# Patient Record
Sex: FEMALE | Race: WHITE | NOT HISPANIC OR LATINO | Employment: FULL TIME | URBAN - METROPOLITAN AREA
[De-identification: names, ages, dates, MRNs, and addresses within clinical notes are randomized per-mention and may not be internally consistent; named-entity substitution may affect disease eponyms.]

---

## 2017-04-01 DIAGNOSIS — Z12.39 ENCOUNTER FOR OTHER SCREENING FOR MALIGNANT NEOPLASM OF BREAST: ICD-10-CM

## 2017-04-27 ENCOUNTER — ALLSCRIPTS OFFICE VISIT (OUTPATIENT)
Dept: OTHER | Facility: OTHER | Age: 62
End: 2017-04-27

## 2017-05-02 ENCOUNTER — GENERIC CONVERSION - ENCOUNTER (OUTPATIENT)
Dept: OTHER | Facility: OTHER | Age: 62
End: 2017-05-02

## 2017-06-09 ENCOUNTER — HOSPITAL ENCOUNTER (OUTPATIENT)
Dept: RADIOLOGY | Facility: HOSPITAL | Age: 62
Discharge: HOME | End: 2017-06-09
Attending: FAMILY MEDICINE
Payer: COMMERCIAL

## 2017-06-09 DIAGNOSIS — Z12.39 ENCOUNTER FOR OTHER SCREENING FOR MALIGNANT NEOPLASM OF BREAST: ICD-10-CM

## 2017-06-09 PROCEDURE — G0202 SCR MAMMO BI INCL CAD: HCPCS

## 2017-06-11 ENCOUNTER — GENERIC CONVERSION - ENCOUNTER (OUTPATIENT)
Dept: OTHER | Facility: OTHER | Age: 62
End: 2017-06-11

## 2017-06-29 ENCOUNTER — GENERIC CONVERSION - ENCOUNTER (OUTPATIENT)
Dept: OTHER | Facility: OTHER | Age: 62
End: 2017-06-29

## 2017-06-30 LAB
A/G RATIO (HISTORICAL): 2 (ref 1.2–2.2)
ALBUMIN SERPL BCP-MCNC: 4.6 G/DL (ref 3.6–4.8)
ALP SERPL-CCNC: 101 IU/L (ref 39–117)
ALT SERPL W P-5'-P-CCNC: 34 IU/L (ref 0–32)
AST SERPL W P-5'-P-CCNC: 24 IU/L (ref 0–40)
BILIRUB SERPL-MCNC: 0.4 MG/DL (ref 0–1.2)
BUN SERPL-MCNC: 14 MG/DL (ref 8–27)
BUN/CREA RATIO (HISTORICAL): 19 (ref 12–28)
CALCIUM SERPL-MCNC: 8.9 MG/DL (ref 8.7–10.3)
CHLORIDE SERPL-SCNC: 102 MMOL/L (ref 96–106)
CHOLEST SERPL-MCNC: 213 MG/DL (ref 100–199)
CO2 SERPL-SCNC: 25 MMOL/L (ref 18–29)
CREAT SERPL-MCNC: 0.72 MG/DL (ref 0.57–1)
EGFR AFRICAN AMERICAN (HISTORICAL): 103 ML/MIN/1.73
EGFR-AMERICAN CALC (HISTORICAL): 89 ML/MIN/1.73
GLUCOSE SERPL-MCNC: 108 MG/DL (ref 65–99)
HBA1C MFR BLD HPLC: 5.5 % (ref 4.8–5.6)
HDLC SERPL-MCNC: 64 MG/DL
INTERPRETATION (HISTORICAL): NORMAL
LDLC SERPL CALC-MCNC: 132 MG/DL (ref 0–99)
POTASSIUM SERPL-SCNC: 4 MMOL/L (ref 3.5–5.2)
SODIUM SERPL-SCNC: 144 MMOL/L (ref 134–144)
TOT. GLOBULIN, SERUM (HISTORICAL): 2.3 G/DL (ref 1.5–4.5)
TOTAL PROTEIN (HISTORICAL): 6.9 G/DL (ref 6–8.5)
TRIGL SERPL-MCNC: 84 MG/DL (ref 0–149)

## 2017-07-01 ENCOUNTER — GENERIC CONVERSION - ENCOUNTER (OUTPATIENT)
Dept: OTHER | Facility: OTHER | Age: 62
End: 2017-07-01

## 2017-10-31 ENCOUNTER — GENERIC CONVERSION - ENCOUNTER (OUTPATIENT)
Dept: OTHER | Facility: OTHER | Age: 62
End: 2017-10-31

## 2018-01-10 NOTE — RESULT NOTES
Verified Results  (1) COMPREHENSIVE METABOLIC PANEL [id number] 76:11ZN Mona Garza     Test Name Result Flag Reference   Glucose, Serum 100 mg/dL H 65-99   BUN 12 mg/dL  8-27   Creatinine, Serum 0 70 mg/dL  0 57-1 00   eGFR If NonAfricn Am 93 mL/min/1 73  >59   eGFR If Africn Am 107 mL/min/1 73  >59   BUN/Creatinine Ratio 17  11-26   Sodium, Serum 144 mmol/L  134-144   **Please note reference interval change**   Potassium, Serum 4 2 mmol/L  3 5-5 2   Chloride, Serum 103 mmol/L     **Please note reference interval change**   Carbon Dioxide, Total 25 mmol/L  18-29   Calcium, Serum 9 0 mg/dL  8 7-10 3   Protein, Total, Serum 6 6 g/dL  6 0-8 5   Albumin, Serum 4 1 g/dL  3 6-4 8   Globulin, Total 2 5 g/dL  1 5-4 5   A/G Ratio 1 6  1 1-2 5   Bilirubin, Total 0 5 mg/dL  0 0-1 2   Alkaline Phosphatase, S 113 IU/L     AST (SGOT) 29 IU/L  0-40   ALT (SGPT) 39 IU/L H 0-32     (1) LIPID PANEL FASTING W DIRECT LDL REFLEX [id number] 08:23AM Mona Garza     Test Name Result Flag Reference   Cholesterol, Total 187 mg/dL  100-199   Triglycerides 87 mg/dL  0-149   HDL Cholesterol 57 mg/dL  >39   LDL Cholesterol Calc 113 mg/dL H 0-99     (1) HEMOGLOBIN A1C [id number] 08:23AM Mona Garza     Test Name Result Flag Reference   Hemoglobin A1c 5 6 %  4 8-5 6   Pre-diabetes: 5 7 - 6 4           Diabetes: >6 4           Glycemic control for adults with diabetes: <7 0       Discussion/Summary   Sugar, kidneys, minerals and liver are normal/ok  Cholesterol profile is normal/good  Please schedule an office appointment    Dr Omero Mckenna

## 2018-01-12 NOTE — RESULT NOTES
Discussion/Summary   Mammogram is normal   Repeat in one year is recommended  Dr Nigel Saab CAD [id number] 09:10AM Ana Mcclellan Order Number: [id number]    - Patient Instructions: To schedule this appointment, please contact Central Scheduling at [phone]  Do not wear any perfume, powder, lotion or deodorant on breast or underarm area  Please bring your doctors order, referral (if needed) and insurance information with you on the day of the test  Failure to bring this information may result in this test being rescheduled  Arrive 15 minutes prior to your appointment time to register  On the day of your test, please bring any prior mammogram or breast studies with you that were not performed at a [facility]  Failure to bring prior exams may result in your test needing to be rescheduled  Test Name Result Flag Reference   MAMMO SCREENING BILATERAL W CAD (Report)     Patient History:   Patient is postmenopausal    Took hormonal contraceptives for 10 years beginning at age 20  Patient's BMI is 28 2  Reason for exam: screening, asymptomatic  Mammo Screening Bilateral W CAD: June 9, 2017 - Check In #:    [de-identified]   Bilateral MLO and CC view(s) were taken  Technologist: Kimberley Vega RTRM   Prior study comparison: March 25, 2016, mammo screening bilateral   W CAD, performed at [facility]  February 16, 2015, bilateral screening mammogram performed at [facility]  December 19, 2013, bilateral    screening mammogram performed at [facility]  April 27, 2012, bilateral screening mammogram performed    at [facility]  September 3, 2010,    bilateral screening mammogram performed at [facility]  There are scattered fibroglandular densities   Bilateral digital    mammography is performed  No dominant soft tissue mass,    architectural distortion or suspicious calcifications are noted  The skin and nipple contours are within normal limits  Scattered benign appearing calcifications are noted as is a right   outer breast intramammary lymph node  No evidence of malignancy  No significant changes when compared to prior studies  1  No evidence of malignancy  2  No significant    change when compared with the prior study  ACR BI-RADSï¾® Assessments: BiRad:2 - Benign     Recommendation:   Routine screening mammogram in 1 year  Analyzed by CAD     8-10% of cancers will be missed on mammography  Management of a    palpable abnormality must be based on clinical grounds  Patients   will be notified of their results via letter from our facility  Accredited by [employer] and FDA       Transcription Location: [facility] 98: [id number]     Risk Value(s):   Tyrer-Cuzick 10 Year: 3 900%, Tyrer-Cuzick Lifetime: 8 800%,    Myriad Table: 1 5%, JONATHON 5 Year: 2 2%, NCI Lifetime: 9 1%   Signed by:   Fang Lopez MD   6/9/17

## 2018-01-13 VITALS
BODY MASS INDEX: 31.66 KG/M2 | HEART RATE: 86 BPM | RESPIRATION RATE: 18 BRPM | TEMPERATURE: 98.2 F | HEIGHT: 66 IN | WEIGHT: 197 LBS | SYSTOLIC BLOOD PRESSURE: 120 MMHG | DIASTOLIC BLOOD PRESSURE: 72 MMHG

## 2018-01-13 NOTE — RESULT NOTES
Discussion/Summary   Sugar is in prediabetes range but kidneys, minerals and liver are normal       Cholesterol profile is ok but could be better with diet/exercise/weight loss     Dr Nan Collado        Verified Results  (1) COMPREHENSIVE METABOLIC PANEL [id number] 62:05QR [employer]     Test Name Result Flag Reference   Glucose, Serum 108 mg/dL H 65-99   BUN 14 mg/dL  8-27   Creatinine, Serum 0 72 mg/dL  0 57-1 00   BUN/Creatinine Ratio 19  12-28   Sodium, Serum 144 mmol/L  134-144   Potassium, Serum 4 0 mmol/L  3 5-5 2   Chloride, Serum 102 mmol/L     Carbon Dioxide, Total 25 mmol/L  18-29   Calcium, Serum 8 9 mg/dL  8 7-10 3   Protein, Total, Serum 6 9 g/dL  6 0-8 5   Albumin, Serum 4 6 g/dL  3 6-4 8   Globulin, Total 2 3 g/dL  1 5-4 5   A/G Ratio 2 0  1 2-2 2   Bilirubin, Total 0 4 mg/dL  0 0-1 2   Alkaline Phosphatase, S 101 IU/L     AST (SGOT) 24 IU/L  0-40   ALT (SGPT) 34 IU/L H 0-32   eGFR If NonAfricn Am 89 mL/min/1 73  >59   eGFR If Africn Am 103 mL/min/1 73  >59     (1) LIPID PANEL FASTING W DIRECT LDL REFLEX [id number] 08:09AM [employer]     Test Name Result Flag Reference   Cholesterol, Total 213 mg/dL H 100-199   Triglycerides 84 mg/dL  0-149   HDL Cholesterol 64 mg/dL  >39   LDL Cholesterol Calc 132 mg/dL H 0-99     (1) HEMOGLOBIN A1C [id number] 08:09AM [employer]     Test Name Result Flag Reference   Hemoglobin A1c 5 5 %  4 8-5 6   Pre-diabetes: 5 7 - 6 4           Diabetes: >6 4           Glycemic control for adults with diabetes: <7 0

## 2018-01-15 ENCOUNTER — ALLSCRIPTS OFFICE VISIT (OUTPATIENT)
Dept: OTHER | Facility: OTHER | Age: 63
End: 2018-01-15

## 2018-01-15 NOTE — MISCELLANEOUS
Message   Recorded as Task   Date: 05/02/2017 02:29 PM, Created By: Liat Sibley   Task Name: Call Back   Assigned To: Ankit Garcia   Regarding Patient: Belgica Byers, Status: Active   CommentWilfany Ferro - 02 May 2017 2:29 PM     TASK CREATED  Caller: Self; [phone] (Home); [phone] (Work)  DR Gabby Rosales    Pt needs 30 days of her prescriptions called into [address] [place] [address] because her mail order will not be there in time, and she is almost out     Chanel Corwin - 02 May 2017 2:36 PM     TASK REASSIGNED: Previously Assigned To [address] [place] [address] Apolinar Das  I sent you refill Bhupinder Soares - 02 May 2017 8:51 PM     TASK EDITED  done        Signatures   Electronically signed by : Carey Meyer DO; May  2 2017  8:52PM EST                       (Author)

## 2018-01-16 NOTE — RESULT NOTES
Verified Results  * MAMMO SCREENING BILATERAL W CAD [id number] 09:42AM Si Gaw Order Number: [id number]     Test Name Result Flag Reference   MAMMO SCREENING BILATERAL W CAD (Report)     Patient History:   Patient is postmenopausal    Took hormonal contraceptives for 10 years beginning at age 20  Patient's BMI is 28 2  Reason for exam: screening (asymptomatic)  Mammo Screening Bilateral W CAD: March 25, 2016 - Check In #:    [de-identified]   Bilateral CC and MLO view(s) were taken  Technologist: RT Jenna(R)(M)   Prior study comparison: February 16, 2015, bilateral screening    mammogram performed at [place] [address]  December 19, 2013, bilateral screening mammogram performed at [place] [address]  There are scattered fibroglandular densities  No new dominant    soft tissue mass, architectural distortion or suspicious    calcifications are noted  The skin and nipple structures are    within normal limits  Benign appearing calcifications are noted  No mammographic evidence of malignancy  No    significant changes when compared with prior studies  ASSESSMENT: BiRad:2 - Benign     Recommendation:   Routine screening mammogram of both breasts in 1 year  Analyzed by CAD     8-10% of cancers will be missed on mammography  Management of a    palpable abnormality must be based on clinical grounds  Patients   will be notified of their results via letter from our facility  Accredited by [employer] and FDA       Transcription Location: [facility] 98: [id number]     Risk Value(s):   Tyrer-Cuzick 10 Year: 4 086%, Tyrer-Cuzick Lifetime: 9 804%,    Myriad Table: 1 5%, JONATHON 5 Year: 2 0%, NCI Lifetime: 9 7%   Signed by:   Dejuan Chamorro MD   3/25/16       Plan  Encounter for screening mammogram for malignant neoplasm of breast    · * MAMMO SCREENING BILATERAL W CAD; Status:Complete;   Done: 93IAT7072  09:42AM    Discussion/Summary   Mammogram is normal   Please follow up in office    Dr Heather Negrete

## 2018-01-16 NOTE — PROGRESS NOTES
Assessment   1  Benign essential hypertension (401 1) (I10)   2  Hyperlipidemia LDL goal <130 (272 4) (E78 5)   3  IFG (impaired fasting glucose) (790 21) (R73 01)   4  BMI 28 0-28 9,adult (V85 24) (Z68 28)    Plan   Benign essential hypertension    · AmLODIPine Besylate 5 MG Oral Tablet; take 1 tablet by mouth every day   · Begin a limited exercise program ; Status:Complete;   Done: 63HUP1664   · Restrict the salt in your diet by avoiding highly salted foods ; Status:Complete;   Done:    41GDP4848   · Shared Decision Making Aid given; Status:Complete;   Done: 35NPO1396   · Follow-up visit in 6 months Evaluation and Treatment  Follow-up  Status: Complete     Done: 95TMZ1374  Benign essential hypertension, Hyperlipidemia LDL goal <130, IFG (impaired fasting    glucose)    · (1) COMPREHENSIVE METABOLIC PANEL; Status:Active; Requested for:15Jan2018;    · (1) HEMOGLOBIN A1C; Status:Active; Requested for:15Jan2018;    · (1) LIPID PANEL FASTING W DIRECT LDL REFLEX; Status:Active; Requested    for:15Jan2018;   Hyperlipidemia LDL goal <130    · Atorvastatin Calcium 10 MG Oral Tablet; TAKE 1 TABLET BY MOUTH ONE TIME    DAILY AS DIRECTED    Discussion/Summary   The patient was counseled regarding risk factor reductions,-- impressions,-- risks and benefits of treatment options  Chief Complaint   Seen for refill on medications  er/cma  History of Present Illness   htn stable labs reviewed aware myalgias of statins for purposes of CVD/CVA risk reduction was advised according to USPSTF guidelines, along with appropriate continued liver monitoring  Review of Systems        Cardiovascular: no chest pain  Respiratory: no shortness of breath  Gastrointestinal: no abdominal pain  Neurological: no dizziness  Active Problems   1  Allergic rhinitis (477 9) (J30 9)   2  Benign essential hypertension (401 1) (I10)   3  Colon cancer screening (V76 51) (Z12 11)   4   General medical examination (V70 9) (Z00 00)   5  Hyperlipidemia LDL goal <130 (272 4) (E78 5)   6  IFG (impaired fasting glucose) (790 21) (R73 01)   7  Immunization due (V05 9) (Z23)   8  Macular degeneration (362 50) (H35 30)   9  Mitral valve disorder (424 0) (I05 9)   10  Screening breast examination (V76 10) (Z12 31)   11  Screening for cardiovascular, respiratory, and genitourinary diseases      (V81 2,V81 4,V81 6) (Z13 6,Z13 83,Z13 89)   12  Screening for diabetes mellitus (DM) (V77 1) (Z13 1)    Past Medical History   1  History of Acute maxillary sinusitis, recurrence not specified (461 0) (J01 00)   2  History of Acute suppurative otitis media of left ear without spontaneous rupture of     tympanic membrane, recurrence not specified (382 00) (H66 002)   3  History of Acute upper respiratory infection (465 9) (J06 9)   4  History of Acute UTI (599 0) (N39 0)   5  History of Flu vaccine need (V04 81) (Z23)   6  History of acute bronchitis (V12 69) (Z87 09)   7  History of athlete's foot (V12 09) (Z86 19)   8  History of chest pain (V13 89) (Z87 898)   9  History of epistaxis (V12 69) (Z87 898)   10  History of headache (V13 89) (Z87 898)   11  History of syncope (V15 89) (Z87 898)   12  History of tinea corporis (V12 09) (Z86 19)   13  History of Immunization due (V05 9) (Z23)   14  History of Knee Sprain (844 9)   15  History of Limb pain (729 5) (M79 609)   16  History of Obesity (BMI 30-39 9) (278 00) (E66 9)   17  History of Open Wound Of The Hand (882 0)    Family History   Mother    1  Family history of Hyperlipidemia   2  Family history of Hypertension     The family history was reviewed and updated today  Social History    · Being A Social Drinker   · Former smoker (K84 92) (R99 630)  The social history was reviewed and updated today  Current Meds    1  AmLODIPine Besylate 5 MG Oral Tablet; take 1 tablet by mouth every day;      Therapy: 12FWS7322 to (Evaluate:06Jan2018)  Requested for: 31JAC2174; Last     Rx:37Pnw6771 Ordered   2  Atorvastatin Calcium 10 MG Oral Tablet; TAKE 1 TABLET BY MOUTH ONE TIME DAILY AS     DIRECTED; Therapy: 21WAR6891 to (Evaluate:27Jan2018)  Requested for: 29Oct2017; Last     Rx:29Oct2017 Ordered    Allergies   1  No Known Drug Allergies    Vitals   Vital Signs    Recorded: 60KVE3680 09:37AM   Temperature 97 9 F   Heart Rate 80   Respiration 20   Systolic 808   Diastolic 78   Height 5 ft 6 in   Weight 175 lb    BMI Calculated 28 25   BSA Calculated 1 89     Physical Exam        Constitutional      General appearance: No acute distress, well appearing and well nourished  Eyes      Conjunctiva and lids: No swelling, erythema or discharge  Pupils and irises: Equal, round and reactive to light  Ears, Nose, Mouth, and Throat      External inspection of ears and nose: Normal        Otoscopic examination: Tympanic membranes translucent with normal light reflex  Canals patent without erythema  Nasal mucosa, septum, and turbinates: Normal without edema or erythema  Oropharynx: Normal with no erythema, edema, exudate or lesions  Pulmonary      Respiratory effort: No increased work of breathing or signs of respiratory distress  Auscultation of lungs: Clear to auscultation  Cardiovascular      Auscultation of heart: Normal rate and rhythm, normal S1 and S2, without murmurs  Examination of extremities for edema and/or varicosities: Normal        Abdomen      Abdomen: Non-tender, no masses  Lymphatic      Palpation of lymph nodes in neck: No lymphadenopathy  Musculoskeletal      Gait and station: Normal        Digits and nails: Normal without clubbing or cyanosis  Skin      Skin and subcutaneous tissue: Normal without rashes or lesions         Psychiatric      Mood and affect: Normal           Results/Data   PHQ-2 Adult Depression Screening 88CTI6620 09:41AM User, Ahs      Test Name Result Flag Reference   PHQ-2 Adult Depression Score 0 Over the last two weeks, how often have you been bothered by any of the following problems? Little interest or pleasure in doing things: Not at all - 0     Feeling down, depressed, or hopeless: Not at all - 0   PHQ-2 Adult Depression Screening Negative          Provider Comments      htn/chol stable advised f/u loss is recommended  counselled      Health Management   Screening breast examination   * MAMMO SCREENING BILATERAL W CAD; every 1 year; Last 48DUB1897; Next Due: 72JFS9126;     Active    Signatures    Electronically signed by : Italo Martinez DO; Jan 16 2018 12:20AM EST                       (Author)

## 2018-01-17 NOTE — MISCELLANEOUS
Message   Recorded as Task   Date: 10/29/2017 08:30 PM, Created By: Angela Coker   Task Name: Follow Up   Assigned To: Ankit Garcia   Regarding Patient: Jayda Aleman, Status: In Progress   Comment:    Ankit Garcia - 29 Oct 2017 8:30 PM     TASK CREATED  due for q6m f/u appt   Veto Newberry - 30 Oct 2017 11:11 AM     TASK REASSIGNED: Previously Assigned To [facility],Team  15 minute appt with Dr Felix Joshi  Thanks! Rosarioe CJN   Sheyla Alcaraz - 30 Oct 2017 11:43 AM     TASK REPLIED TO: Previously Assigned To Katherin Robison  Left vm for pt to call back to schedule  Cordelia Regan - 31 Oct 2017 10:30 AM     TASK EDITED  LMOM TCB   Cordelia Regan - 31 Oct 2017 10:30 AM     TASK IN PROGRESS   Sima Dupont - 31 Oct 2017 5:15 PM     TASK EDITED  Pt returned call and left message on voicemail to return call  Echo Payne 61  - 31 Oct 2017 6:57 PM     TASK REASSIGNED: Previously Assigned To [address] [place], [place] [address]niki Das  spoke with pt, she is aware to schedule an appointment, she is going to check her work schedule because she is very busy and then will call back to marisol a follow up appointment   ac/cma        Signatures   Electronically signed by : Glenda Ramon DO; Oct 31 2017  7:27PM EST                       (Author)

## 2018-01-23 VITALS
BODY MASS INDEX: 28.12 KG/M2 | SYSTOLIC BLOOD PRESSURE: 132 MMHG | HEART RATE: 80 BPM | RESPIRATION RATE: 20 BRPM | HEIGHT: 66 IN | WEIGHT: 175 LBS | DIASTOLIC BLOOD PRESSURE: 78 MMHG | TEMPERATURE: 97.9 F

## 2018-02-27 ENCOUNTER — OFFICE VISIT (OUTPATIENT)
Dept: FAMILY MEDICINE CLINIC | Facility: CLINIC | Age: 63
End: 2018-02-27
Payer: COMMERCIAL

## 2018-02-27 VITALS
BODY MASS INDEX: 28.12 KG/M2 | TEMPERATURE: 99 F | HEART RATE: 80 BPM | DIASTOLIC BLOOD PRESSURE: 74 MMHG | WEIGHT: 174.2 LBS | SYSTOLIC BLOOD PRESSURE: 122 MMHG

## 2018-02-27 DIAGNOSIS — R04.0 EPISTAXIS: ICD-10-CM

## 2018-02-27 DIAGNOSIS — G44.89 OTHER HEADACHE SYNDROME: Primary | ICD-10-CM

## 2018-02-27 DIAGNOSIS — E78.5 HYPERLIPIDEMIA LDL GOAL <130: ICD-10-CM

## 2018-02-27 DIAGNOSIS — I10 BENIGN ESSENTIAL HYPERTENSION: ICD-10-CM

## 2018-02-27 DIAGNOSIS — R73.01 IFG (IMPAIRED FASTING GLUCOSE): ICD-10-CM

## 2018-02-27 PROCEDURE — 3078F DIAST BP <80 MM HG: CPT | Performed by: FAMILY MEDICINE

## 2018-02-27 PROCEDURE — 99214 OFFICE O/P EST MOD 30 MIN: CPT | Performed by: FAMILY MEDICINE

## 2018-02-27 PROCEDURE — 3074F SYST BP LT 130 MM HG: CPT | Performed by: FAMILY MEDICINE

## 2018-02-27 RX ORDER — AMLODIPINE BESYLATE 5 MG/1
TABLET ORAL
COMMUNITY
Start: 2018-01-16 | End: 2018-06-26 | Stop reason: SDUPTHER

## 2018-02-27 RX ORDER — ATORVASTATIN CALCIUM 10 MG/1
TABLET, FILM COATED ORAL
COMMUNITY
Start: 2018-01-16 | End: 2018-06-26 | Stop reason: SDUPTHER

## 2018-02-27 NOTE — PROGRESS NOTES
Assessment/Plan:    R/o intracranial process for usual headache w/o sign of infection  ent for possible posterior bleed enedelia if no better with saline and vaseline  Htn/chol stable  Prediabetes diet advised again       Diagnoses and all orders for this visit:    Other headache syndrome    Epistaxis    Other orders  -     amLODIPine (NORVASC) 5 mg tablet;   -     atorvastatin (LIPITOR) 10 mg tablet; No Follow-up on file  Subjective:      Patient ID: Elan Worrell is a 60 y o  female  Chief Complaint   Patient presents with    Nose Bleed     1x month        Nose bleeds, 1m, 8-10d, spontaneous, some from blowing, every few days, large blood clot last pm, drips down back for even 1hr, pressure glabella, no dizziness, no syncope, vision stable  Arms/legs fine, some right ear pressure, no f/c, bleeds for few minutes to 30min, no nsaids        The following portions of the patient's history were reviewed and updated as appropriate: allergies, current medications, past family history, past medical history, past social history, past surgical history and problem list     Review of Systems      Current Outpatient Prescriptions   Medication Sig Dispense Refill    amLODIPine (NORVASC) 5 mg tablet       atorvastatin (LIPITOR) 10 mg tablet        No current facility-administered medications for this visit  Objective:    /76   Pulse 80   Temp 99 °F (37 2 °C)   Wt 79 kg (174 lb 3 2 oz)   BMI 28 12 kg/m²        Physical Exam   Constitutional: She appears well-developed  HENT:   Head: Normocephalic  Right septal scab   Eyes: Conjunctivae are normal    Neck: Neck supple  Cardiovascular: Normal rate and intact distal pulses  Pulmonary/Chest: Effort normal  No respiratory distress  Abdominal: Soft  Musculoskeletal: She exhibits no edema or deformity  Neurological: She is alert  Skin: Skin is warm and dry     Psychiatric: Her behavior is normal  Thought content normal    Nursing note and vitals reviewed        No blood dripping in posterior pharynx at this time   no sinus pain    Yetta Comings, DO

## 2018-03-02 ENCOUNTER — HOSPITAL ENCOUNTER (OUTPATIENT)
Dept: RADIOLOGY | Facility: HOSPITAL | Age: 63
Discharge: HOME | End: 2018-03-02
Attending: FAMILY MEDICINE
Payer: COMMERCIAL

## 2018-03-02 DIAGNOSIS — R04.0 EPISTAXIS: ICD-10-CM

## 2018-03-02 DIAGNOSIS — G44.89 OTHER HEADACHE SYNDROME: ICD-10-CM

## 2018-03-02 PROCEDURE — 70450 CT HEAD/BRAIN W/O DYE: CPT

## 2018-05-27 LAB
ALBUMIN SERPL-MCNC: 4.9 G/DL (ref 3.6–4.8)
ALBUMIN/GLOB SERPL: 2.5 {RATIO} (ref 1.2–2.2)
ALP SERPL-CCNC: 90 IU/L (ref 39–117)
ALT SERPL-CCNC: 27 IU/L (ref 0–32)
AST SERPL-CCNC: 21 IU/L (ref 0–40)
BILIRUB SERPL-MCNC: 0.5 MG/DL (ref 0–1.2)
BUN SERPL-MCNC: 16 MG/DL (ref 8–27)
BUN/CREAT SERPL: 21 (ref 12–28)
CALCIUM SERPL-MCNC: 9.6 MG/DL (ref 8.7–10.3)
CHLORIDE SERPL-SCNC: 101 MMOL/L (ref 96–106)
CHOLEST SERPL-MCNC: 221 MG/DL (ref 100–199)
CO2 SERPL-SCNC: 25 MMOL/L (ref 18–29)
CREAT SERPL-MCNC: 0.76 MG/DL (ref 0.57–1)
GLOBULIN SER-MCNC: 2 G/DL (ref 1.5–4.5)
GLUCOSE SERPL-MCNC: 92 MG/DL (ref 65–99)
HBA1C MFR BLD: 5.5 % (ref 4.8–5.6)
HDLC SERPL-MCNC: 66 MG/DL
LDLC SERPL CALC-MCNC: 134 MG/DL (ref 0–99)
MICRODELETION SYND BLD/T FISH: NORMAL
POTASSIUM SERPL-SCNC: 3.9 MMOL/L (ref 3.5–5.2)
PROT SERPL-MCNC: 6.9 G/DL (ref 6–8.5)
SL AMB EGFR AFRICAN AMERICAN: 97 ML/MIN/1.73
SL AMB EGFR NON AFRICAN AMERICAN: 84 ML/MIN/1.73
SODIUM SERPL-SCNC: 143 MMOL/L (ref 134–144)
TRIGL SERPL-MCNC: 104 MG/DL (ref 0–149)

## 2018-06-26 ENCOUNTER — TELEPHONE (OUTPATIENT)
Dept: FAMILY MEDICINE CLINIC | Facility: CLINIC | Age: 63
End: 2018-06-26

## 2018-06-26 DIAGNOSIS — E78.5 HYPERLIPIDEMIA LDL GOAL <130: ICD-10-CM

## 2018-06-26 DIAGNOSIS — Z12.39 BREAST CANCER SCREENING: ICD-10-CM

## 2018-06-26 DIAGNOSIS — I10 BENIGN ESSENTIAL HYPERTENSION: Primary | ICD-10-CM

## 2018-06-26 RX ORDER — ATORVASTATIN CALCIUM 10 MG/1
10 TABLET, FILM COATED ORAL DAILY
Qty: 90 TABLET | Refills: 1 | Status: SHIPPED | OUTPATIENT
Start: 2018-06-26 | End: 2019-01-11 | Stop reason: SDUPTHER

## 2018-06-26 RX ORDER — AMLODIPINE BESYLATE 5 MG/1
5 TABLET ORAL DAILY
Qty: 90 TABLET | Refills: 1 | Status: SHIPPED | OUTPATIENT
Start: 2018-06-26 | End: 2019-01-11 | Stop reason: SDUPTHER

## 2018-07-25 ENCOUNTER — HOSPITAL ENCOUNTER (OUTPATIENT)
Dept: RADIOLOGY | Facility: HOSPITAL | Age: 63
Discharge: HOME | End: 2018-07-25
Attending: FAMILY MEDICINE
Payer: COMMERCIAL

## 2018-07-25 DIAGNOSIS — Z12.39 BREAST CANCER SCREENING: ICD-10-CM

## 2018-07-25 PROCEDURE — 77067 SCR MAMMO BI INCL CAD: CPT

## 2019-01-11 ENCOUNTER — OFFICE VISIT (OUTPATIENT)
Dept: FAMILY MEDICINE CLINIC | Facility: CLINIC | Age: 64
End: 2019-01-11
Payer: COMMERCIAL

## 2019-01-11 VITALS
HEART RATE: 78 BPM | RESPIRATION RATE: 18 BRPM | SYSTOLIC BLOOD PRESSURE: 130 MMHG | WEIGHT: 177.2 LBS | DIASTOLIC BLOOD PRESSURE: 80 MMHG | HEIGHT: 66 IN | TEMPERATURE: 98 F | BODY MASS INDEX: 28.48 KG/M2

## 2019-01-11 DIAGNOSIS — I10 BENIGN ESSENTIAL HYPERTENSION: Primary | ICD-10-CM

## 2019-01-11 DIAGNOSIS — E78.5 HYPERLIPIDEMIA LDL GOAL <130: ICD-10-CM

## 2019-01-11 DIAGNOSIS — Z23 IMMUNIZATION DUE: ICD-10-CM

## 2019-01-11 DIAGNOSIS — R53.83 OTHER FATIGUE: ICD-10-CM

## 2019-01-11 DIAGNOSIS — R05.9 COUGH: ICD-10-CM

## 2019-01-11 PROCEDURE — 3008F BODY MASS INDEX DOCD: CPT | Performed by: FAMILY MEDICINE

## 2019-01-11 PROCEDURE — 1036F TOBACCO NON-USER: CPT | Performed by: FAMILY MEDICINE

## 2019-01-11 PROCEDURE — 90471 IMMUNIZATION ADMIN: CPT

## 2019-01-11 PROCEDURE — 3079F DIAST BP 80-89 MM HG: CPT | Performed by: FAMILY MEDICINE

## 2019-01-11 PROCEDURE — 3075F SYST BP GE 130 - 139MM HG: CPT | Performed by: FAMILY MEDICINE

## 2019-01-11 PROCEDURE — 90682 RIV4 VACC RECOMBINANT DNA IM: CPT

## 2019-01-11 PROCEDURE — 99214 OFFICE O/P EST MOD 30 MIN: CPT | Performed by: FAMILY MEDICINE

## 2019-01-11 RX ORDER — AZITHROMYCIN 250 MG/1
TABLET, FILM COATED ORAL
Qty: 6 TABLET | Refills: 0 | Status: SHIPPED | OUTPATIENT
Start: 2019-01-11 | End: 2019-01-16

## 2019-01-11 RX ORDER — AMLODIPINE BESYLATE 5 MG/1
5 TABLET ORAL DAILY
Qty: 90 TABLET | Refills: 1 | Status: SHIPPED | OUTPATIENT
Start: 2019-01-11 | End: 2019-04-23

## 2019-01-11 RX ORDER — ATORVASTATIN CALCIUM 10 MG/1
10 TABLET, FILM COATED ORAL DAILY
Qty: 90 TABLET | Refills: 1 | Status: SHIPPED | OUTPATIENT
Start: 2019-01-11 | End: 2019-06-03 | Stop reason: SDUPTHER

## 2019-01-11 NOTE — PROGRESS NOTES
Assessment/Plan:    No problem-specific Assessment & Plan notes found for this encounter  Htn/chol stable  Cough prolonged, use mucinex DM bid , add abx if no better  q6m f/u advised     Diagnoses and all orders for this visit:    Benign essential hypertension  -     amLODIPine (NORVASC) 5 mg tablet; Take 1 tablet (5 mg total) by mouth daily  -     Comprehensive metabolic panel; Future    Hyperlipidemia LDL goal <130  -     atorvastatin (LIPITOR) 10 mg tablet; Take 1 tablet (10 mg total) by mouth daily  -     Lipid Panel with Direct LDL reflex; Future    Immunization due  -     influenza vaccine, [id number], quadrivalent, recombinant, PF, 0 5 mL, for patients 18 yr+ (FLUBLOK)    Other fatigue  -     TSH, 3rd generation; Future    Cough  -     azithromycin (ZITHROMAX) 250 mg tablet; Take 500mg on day 1, 250mg on days 2-5              Return in about 6 months (around 7/11/2019) for Recheck  Subjective:      Patient ID: Chel Rosa is a 58 y o  female  Chief Complaint   Patient presents with    Medication Refill     akrma     Follow-up     on medications akrma        HPI  Uri sx for 3w, fits of cough, no green mucus, congestion  Tolerating meds  No myalgias  Night time cough  Mostly low fat diet  Exercises daily, walking    No cp or edema  htn stable    No myalgias on lipitor  Use of statins for the purposes of CVD/CVA risks reduction was advised according to USPSTF guidelines along with appropriate continued liver monitoring  The following portions of the patient's history were reviewed and updated as appropriate: allergies, current medications, past family history, past medical history, past social history, past surgical history and problem list     Review of Systems   Respiratory: Negative for shortness of breath  Cardiovascular: Negative for chest pain and leg swelling           Current Outpatient Prescriptions   Medication Sig Dispense Refill    amLODIPine (NORVASC) 5 mg tablet Take 1 tablet (5 mg total) by mouth daily 90 tablet 1    atorvastatin (LIPITOR) 10 mg tablet Take 1 tablet (10 mg total) by mouth daily 90 tablet 1    azithromycin (ZITHROMAX) 250 mg tablet Take 500mg on day 1, 250mg on days 2-5 6 tablet 0     No current facility-administered medications for this visit  Objective:    /80   Pulse 78   Temp 98 °F (36 7 °C)   Resp 18   Ht 5' 6" (1 676 m)   Wt 80 4 kg (177 lb 3 2 oz)   BMI 28 60 kg/m²        Physical Exam   Constitutional: She appears well-developed  No distress  HENT:   Head: Normocephalic  Mouth/Throat: No oropharyngeal exudate  Eyes: Conjunctivae are normal  No scleral icterus  Neck: Neck supple  Cardiovascular: Normal rate and intact distal pulses  No murmur heard  Pulmonary/Chest: Effort normal  No respiratory distress  She has no wheezes  Abdominal: Soft  There is no tenderness  Musculoskeletal: She exhibits no edema or deformity  Neurological: She is alert  She exhibits normal muscle tone  Skin: Skin is warm and dry  No rash noted  No pallor  Psychiatric: Her behavior is normal  Thought content normal    Nursing note and vitals reviewed               Tennille Interiano DO

## 2019-04-23 ENCOUNTER — OFFICE VISIT (OUTPATIENT)
Dept: CARDIOLOGY CLINIC | Facility: CLINIC | Age: 64
End: 2019-04-23
Payer: COMMERCIAL

## 2019-04-23 VITALS
SYSTOLIC BLOOD PRESSURE: 130 MMHG | HEART RATE: 124 BPM | WEIGHT: 174 LBS | OXYGEN SATURATION: 97 % | DIASTOLIC BLOOD PRESSURE: 80 MMHG | BODY MASS INDEX: 27.97 KG/M2 | HEIGHT: 66 IN

## 2019-04-23 DIAGNOSIS — R73.01 IFG (IMPAIRED FASTING GLUCOSE): ICD-10-CM

## 2019-04-23 DIAGNOSIS — I05.9 MITRAL VALVE DISORDER: ICD-10-CM

## 2019-04-23 DIAGNOSIS — I10 BENIGN ESSENTIAL HYPERTENSION: ICD-10-CM

## 2019-04-23 DIAGNOSIS — I48.19 PERSISTENT ATRIAL FIBRILLATION (HCC): ICD-10-CM

## 2019-04-23 DIAGNOSIS — E78.5 DYSLIPIDEMIA: ICD-10-CM

## 2019-04-23 DIAGNOSIS — R06.02 EXERTIONAL SHORTNESS OF BREATH: ICD-10-CM

## 2019-04-23 PROBLEM — I48.91 ATRIAL FIBRILLATION (HCC): Status: ACTIVE | Noted: 2019-04-23

## 2019-04-23 PROCEDURE — 93000 ELECTROCARDIOGRAM COMPLETE: CPT | Performed by: INTERNAL MEDICINE

## 2019-04-23 PROCEDURE — 99245 OFF/OP CONSLTJ NEW/EST HI 55: CPT | Performed by: INTERNAL MEDICINE

## 2019-04-23 RX ORDER — APIXABAN 5 MG/1
1 TABLET, FILM COATED ORAL 2 TIMES DAILY
Refills: 0 | COMMUNITY
Start: 2019-04-20 | End: 2019-05-17 | Stop reason: SDUPTHER

## 2019-04-23 RX ORDER — DILTIAZEM HYDROCHLORIDE 240 MG/1
CAPSULE, COATED, EXTENDED RELEASE ORAL
Refills: 0 | COMMUNITY
Start: 2019-04-20 | End: 2019-05-17

## 2019-04-24 ENCOUNTER — ANESTHESIA (EMERGENCY)
Dept: NON INVASIVE DIAGNOSTICS | Facility: HOSPITAL | Age: 64
End: 2019-04-24
Payer: COMMERCIAL

## 2019-04-24 ENCOUNTER — APPOINTMENT (INPATIENT)
Dept: NON INVASIVE DIAGNOSTICS | Facility: HOSPITAL | Age: 64
End: 2019-04-24
Attending: INTERNAL MEDICINE
Payer: COMMERCIAL

## 2019-04-24 ENCOUNTER — APPOINTMENT (EMERGENCY)
Dept: RADIOLOGY | Facility: HOSPITAL | Age: 64
End: 2019-04-24
Payer: COMMERCIAL

## 2019-04-24 ENCOUNTER — HOSPITAL ENCOUNTER (EMERGENCY)
Facility: HOSPITAL | Age: 64
Discharge: HOME | End: 2019-04-24
Attending: EMERGENCY MEDICINE | Admitting: EMERGENCY MEDICINE
Payer: COMMERCIAL

## 2019-04-24 ENCOUNTER — ANESTHESIA EVENT (EMERGENCY)
Dept: NON INVASIVE DIAGNOSTICS | Facility: HOSPITAL | Age: 64
End: 2019-04-24
Payer: COMMERCIAL

## 2019-04-24 VITALS
SYSTOLIC BLOOD PRESSURE: 132 MMHG | DIASTOLIC BLOOD PRESSURE: 78 MMHG | WEIGHT: 173.94 LBS | OXYGEN SATURATION: 99 % | BODY MASS INDEX: 28.08 KG/M2 | RESPIRATION RATE: 20 BRPM | HEART RATE: 55 BPM | TEMPERATURE: 97.2 F

## 2019-04-24 DIAGNOSIS — I48.91 ATRIAL FIBRILLATION (HCC): Primary | ICD-10-CM

## 2019-04-24 DIAGNOSIS — I48.19 PERSISTENT ATRIAL FIBRILLATION (HCC): ICD-10-CM

## 2019-04-24 LAB
ALBUMIN SERPL BCP-MCNC: 3.7 G/DL (ref 3.5–5)
ALP SERPL-CCNC: 97 U/L (ref 46–116)
ALT SERPL W P-5'-P-CCNC: 57 U/L (ref 12–78)
ANION GAP SERPL CALCULATED.3IONS-SCNC: 12 MMOL/L (ref 4–13)
AST SERPL W P-5'-P-CCNC: 23 U/L (ref 5–45)
BASOPHILS # BLD AUTO: 0.02 THOUSANDS/ΜL (ref 0–0.1)
BASOPHILS NFR BLD AUTO: 0 % (ref 0–1)
BILIRUB SERPL-MCNC: 0.6 MG/DL (ref 0.2–1)
BUN SERPL-MCNC: 19 MG/DL (ref 5–25)
CALCIUM SERPL-MCNC: 9.1 MG/DL (ref 8.3–10.1)
CHLORIDE SERPL-SCNC: 106 MMOL/L (ref 100–108)
CO2 SERPL-SCNC: 23 MMOL/L (ref 21–32)
CREAT SERPL-MCNC: 0.9 MG/DL (ref 0.6–1.3)
EOSINOPHIL # BLD AUTO: 0.13 THOUSAND/ΜL (ref 0–0.61)
EOSINOPHIL NFR BLD AUTO: 2 % (ref 0–6)
ERYTHROCYTE [DISTWIDTH] IN BLOOD BY AUTOMATED COUNT: 13.3 % (ref 11.6–15.1)
GFR SERPL CREATININE-BSD FRML MDRD: 68 ML/MIN/1.73SQ M
GLUCOSE SERPL-MCNC: 102 MG/DL (ref 65–140)
HCT VFR BLD AUTO: 45 % (ref 34.8–46.1)
HGB BLD-MCNC: 14.6 G/DL (ref 11.5–15.4)
IMM GRANULOCYTES # BLD AUTO: 0.03 THOUSAND/UL (ref 0–0.2)
IMM GRANULOCYTES NFR BLD AUTO: 0 % (ref 0–2)
LYMPHOCYTES # BLD AUTO: 1.28 THOUSANDS/ΜL (ref 0.6–4.47)
LYMPHOCYTES NFR BLD AUTO: 19 % (ref 14–44)
MCH RBC QN AUTO: 28.2 PG (ref 26.8–34.3)
MCHC RBC AUTO-ENTMCNC: 32.4 G/DL (ref 31.4–37.4)
MCV RBC AUTO: 87 FL (ref 82–98)
MONOCYTES # BLD AUTO: 0.51 THOUSAND/ΜL (ref 0.17–1.22)
MONOCYTES NFR BLD AUTO: 8 % (ref 4–12)
NEUTROPHILS # BLD AUTO: 4.79 THOUSANDS/ΜL (ref 1.85–7.62)
NEUTS SEG NFR BLD AUTO: 71 % (ref 43–75)
NRBC BLD AUTO-RTO: 0 /100 WBCS
PLATELET # BLD AUTO: 247 THOUSANDS/UL (ref 149–390)
PMV BLD AUTO: 9.3 FL (ref 8.9–12.7)
POTASSIUM SERPL-SCNC: 3.7 MMOL/L (ref 3.5–5.3)
PROT SERPL-MCNC: 6.9 G/DL (ref 6.4–8.2)
RBC # BLD AUTO: 5.17 MILLION/UL (ref 3.81–5.12)
SODIUM SERPL-SCNC: 141 MMOL/L (ref 136–145)
TROPONIN I SERPL-MCNC: <0.02 NG/ML
WBC # BLD AUTO: 6.76 THOUSAND/UL (ref 4.31–10.16)

## 2019-04-24 PROCEDURE — 80053 COMPREHEN METABOLIC PANEL: CPT | Performed by: EMERGENCY MEDICINE

## 2019-04-24 PROCEDURE — 36415 COLL VENOUS BLD VENIPUNCTURE: CPT | Performed by: EMERGENCY MEDICINE

## 2019-04-24 PROCEDURE — 93005 ELECTROCARDIOGRAM TRACING: CPT

## 2019-04-24 PROCEDURE — 84484 ASSAY OF TROPONIN QUANT: CPT | Performed by: EMERGENCY MEDICINE

## 2019-04-24 PROCEDURE — 92960 CARDIOVERSION ELECTRIC EXT: CPT

## 2019-04-24 PROCEDURE — 92960 CARDIOVERSION ELECTRIC EXT: CPT | Performed by: INTERNAL MEDICINE

## 2019-04-24 PROCEDURE — 85025 COMPLETE CBC W/AUTO DIFF WBC: CPT | Performed by: EMERGENCY MEDICINE

## 2019-04-24 PROCEDURE — 71045 X-RAY EXAM CHEST 1 VIEW: CPT

## 2019-04-24 PROCEDURE — 96360 HYDRATION IV INFUSION INIT: CPT

## 2019-04-24 PROCEDURE — 96361 HYDRATE IV INFUSION ADD-ON: CPT

## 2019-04-24 PROCEDURE — 99285 EMERGENCY DEPT VISIT HI MDM: CPT

## 2019-04-24 RX ORDER — SODIUM CHLORIDE 9 MG/ML
INJECTION, SOLUTION INTRAVENOUS CONTINUOUS PRN
Status: DISCONTINUED | OUTPATIENT
Start: 2019-04-24 | End: 2019-04-24 | Stop reason: SURG

## 2019-04-24 RX ORDER — PROPOFOL 10 MG/ML
INJECTION, EMULSION INTRAVENOUS AS NEEDED
Status: DISCONTINUED | OUTPATIENT
Start: 2019-04-24 | End: 2019-04-24 | Stop reason: SURG

## 2019-04-24 RX ORDER — LIDOCAINE HYDROCHLORIDE 10 MG/ML
INJECTION, SOLUTION INFILTRATION; PERINEURAL AS NEEDED
Status: DISCONTINUED | OUTPATIENT
Start: 2019-04-24 | End: 2019-04-24 | Stop reason: SURG

## 2019-04-24 RX ADMIN — PROPOFOL 20 MG: 10 INJECTION, EMULSION INTRAVENOUS at 08:27

## 2019-04-24 RX ADMIN — PROPOFOL 80 MG: 10 INJECTION, EMULSION INTRAVENOUS at 08:26

## 2019-04-24 RX ADMIN — PROPOFOL 40 MG: 10 INJECTION, EMULSION INTRAVENOUS at 08:31

## 2019-04-24 RX ADMIN — SODIUM CHLORIDE: 0.9 INJECTION, SOLUTION INTRAVENOUS at 08:17

## 2019-04-24 RX ADMIN — LIDOCAINE HYDROCHLORIDE 50 MG: 10 INJECTION, SOLUTION INFILTRATION; PERINEURAL at 08:26

## 2019-04-24 RX ADMIN — SODIUM CHLORIDE 1000 ML: 0.9 INJECTION, SOLUTION INTRAVENOUS at 07:43

## 2019-04-25 ENCOUNTER — VBI (OUTPATIENT)
Dept: FAMILY MEDICINE CLINIC | Facility: CLINIC | Age: 64
End: 2019-04-25

## 2019-04-26 LAB
ATRIAL RATE: 187 BPM
QRS AXIS: -15 DEGREES
QRSD INTERVAL: 86 MS
QT INTERVAL: 394 MS
QTC INTERVAL: 468 MS
T WAVE AXIS: 33 DEGREES
VENTRICULAR RATE: 85 BPM

## 2019-04-26 PROCEDURE — 93010 ELECTROCARDIOGRAM REPORT: CPT | Performed by: INTERNAL MEDICINE

## 2019-05-01 ENCOUNTER — TELEPHONE (OUTPATIENT)
Dept: CARDIOLOGY CLINIC | Facility: CLINIC | Age: 64
End: 2019-05-01

## 2019-05-04 DIAGNOSIS — I48.19 PERSISTENT ATRIAL FIBRILLATION (HCC): ICD-10-CM

## 2019-05-06 ENCOUNTER — TELEPHONE (OUTPATIENT)
Dept: CARDIOLOGY CLINIC | Facility: CLINIC | Age: 64
End: 2019-05-06

## 2019-05-17 ENCOUNTER — OFFICE VISIT (OUTPATIENT)
Dept: CARDIOLOGY CLINIC | Facility: CLINIC | Age: 64
End: 2019-05-17
Payer: COMMERCIAL

## 2019-05-17 VITALS
WEIGHT: 173 LBS | OXYGEN SATURATION: 98 % | HEIGHT: 66 IN | SYSTOLIC BLOOD PRESSURE: 140 MMHG | BODY MASS INDEX: 27.8 KG/M2 | HEART RATE: 63 BPM | DIASTOLIC BLOOD PRESSURE: 80 MMHG

## 2019-05-17 DIAGNOSIS — I05.9 MITRAL VALVE DISORDER: ICD-10-CM

## 2019-05-17 DIAGNOSIS — R06.02 EXERTIONAL SHORTNESS OF BREATH: ICD-10-CM

## 2019-05-17 DIAGNOSIS — I48.19 PERSISTENT ATRIAL FIBRILLATION (HCC): ICD-10-CM

## 2019-05-17 DIAGNOSIS — R73.01 IFG (IMPAIRED FASTING GLUCOSE): ICD-10-CM

## 2019-05-17 DIAGNOSIS — I10 BENIGN ESSENTIAL HYPERTENSION: ICD-10-CM

## 2019-05-17 DIAGNOSIS — I48.0 PAROXYSMAL ATRIAL FIBRILLATION (HCC): ICD-10-CM

## 2019-05-17 PROCEDURE — 99214 OFFICE O/P EST MOD 30 MIN: CPT | Performed by: INTERNAL MEDICINE

## 2019-05-17 PROCEDURE — 93000 ELECTROCARDIOGRAM COMPLETE: CPT | Performed by: INTERNAL MEDICINE

## 2019-05-17 RX ORDER — APIXABAN 5 MG/1
5 TABLET, FILM COATED ORAL 2 TIMES DAILY
Qty: 180 TABLET | Refills: 3 | Status: SHIPPED | OUTPATIENT
Start: 2019-05-17 | End: 2020-02-10

## 2019-06-03 DIAGNOSIS — E78.5 HYPERLIPIDEMIA LDL GOAL <130: ICD-10-CM

## 2019-06-03 DIAGNOSIS — I10 BENIGN ESSENTIAL HYPERTENSION: ICD-10-CM

## 2019-06-03 RX ORDER — ATORVASTATIN CALCIUM 10 MG/1
TABLET, FILM COATED ORAL
Qty: 90 TABLET | Refills: 1 | Status: SHIPPED | OUTPATIENT
Start: 2019-06-03 | End: 2019-11-05 | Stop reason: SDUPTHER

## 2019-06-03 RX ORDER — AMLODIPINE BESYLATE 5 MG/1
TABLET ORAL
Qty: 90 TABLET | Refills: 1 | Status: SHIPPED | OUTPATIENT
Start: 2019-06-03 | End: 2019-06-25

## 2019-06-12 ENCOUNTER — HOSPITAL ENCOUNTER (OUTPATIENT)
Dept: NON INVASIVE DIAGNOSTICS | Facility: HOSPITAL | Age: 64
Discharge: HOME | End: 2019-06-12
Attending: INTERNAL MEDICINE
Payer: COMMERCIAL

## 2019-06-12 ENCOUNTER — HOSPITAL ENCOUNTER (OUTPATIENT)
Dept: RADIOLOGY | Facility: HOSPITAL | Age: 64
Discharge: HOME | End: 2019-06-12
Attending: INTERNAL MEDICINE
Payer: COMMERCIAL

## 2019-06-12 DIAGNOSIS — I10 BENIGN ESSENTIAL HYPERTENSION: ICD-10-CM

## 2019-06-12 DIAGNOSIS — R06.02 EXERTIONAL SHORTNESS OF BREATH: ICD-10-CM

## 2019-06-12 DIAGNOSIS — I05.9 MITRAL VALVE DISORDER: ICD-10-CM

## 2019-06-12 DIAGNOSIS — I48.0 PAROXYSMAL ATRIAL FIBRILLATION (HCC): ICD-10-CM

## 2019-06-12 LAB
CHEST PAIN STATEMENT: NORMAL
MAX DIASTOLIC BP: 80 MMHG
MAX HEART RATE: 137 BPM
MAX PREDICTED HEART RATE: 155 BPM
MAX. SYSTOLIC BP: 174 MMHG
PROTOCOL NAME: NORMAL
TARGET HR FORMULA: NORMAL
TEST INDICATION: NORMAL
TIME IN EXERCISE PHASE: NORMAL

## 2019-06-12 PROCEDURE — A9502 TC99M TETROFOSMIN: HCPCS

## 2019-06-12 PROCEDURE — 93017 CV STRESS TEST TRACING ONLY: CPT

## 2019-06-12 PROCEDURE — 93306 TTE W/DOPPLER COMPLETE: CPT

## 2019-06-12 PROCEDURE — 78452 HT MUSCLE IMAGE SPECT MULT: CPT

## 2019-06-13 ENCOUNTER — TELEPHONE (OUTPATIENT)
Dept: CARDIOLOGY CLINIC | Facility: CLINIC | Age: 64
End: 2019-06-13

## 2019-06-13 PROCEDURE — 93018 CV STRESS TEST I&R ONLY: CPT | Performed by: INTERNAL MEDICINE

## 2019-06-13 PROCEDURE — 93016 CV STRESS TEST SUPVJ ONLY: CPT | Performed by: INTERNAL MEDICINE

## 2019-06-13 PROCEDURE — 78452 HT MUSCLE IMAGE SPECT MULT: CPT | Performed by: INTERNAL MEDICINE

## 2019-06-13 PROCEDURE — 93306 TTE W/DOPPLER COMPLETE: CPT | Performed by: INTERNAL MEDICINE

## 2019-06-14 ENCOUNTER — TELEPHONE (OUTPATIENT)
Dept: CARDIOLOGY CLINIC | Facility: CLINIC | Age: 64
End: 2019-06-14

## 2019-06-25 ENCOUNTER — OFFICE VISIT (OUTPATIENT)
Dept: URGENT CARE | Facility: CLINIC | Age: 64
End: 2019-06-25
Payer: COMMERCIAL

## 2019-06-25 VITALS
TEMPERATURE: 98.8 F | RESPIRATION RATE: 18 BRPM | HEIGHT: 66 IN | HEART RATE: 62 BPM | WEIGHT: 172.8 LBS | SYSTOLIC BLOOD PRESSURE: 170 MMHG | DIASTOLIC BLOOD PRESSURE: 94 MMHG | OXYGEN SATURATION: 100 % | BODY MASS INDEX: 27.77 KG/M2

## 2019-06-25 DIAGNOSIS — H92.22 BLEEDING FROM LEFT EAR: Primary | ICD-10-CM

## 2019-06-25 PROCEDURE — 99213 OFFICE O/P EST LOW 20 MIN: CPT | Performed by: FAMILY MEDICINE

## 2019-09-20 NOTE — PROGRESS NOTES
Consultation - Cardiology Office  [facility]  Taina Saez 71 y o  female MRN: [id number]  : 1955  Unit/Bed#:  Encounter: [id number]      Assessment:     1  Paroxysmal atrial fibrillation (HCC)    2  Benign essential hypertension    3  Mitral valve disorder    4  IFG (impaired fasting glucose)    5  Other fatigue        Discussion summary and Plan:      1  Paroxysmal atrial fibrillation  Patient is staying in sinus rhythm  She is on low-dose metoprolol and Eliquis  Heart rate is acceptable  She may need to go on flecainide  Her echo and stress test shows she has a normal LV systolic function  She will be started on low-dose flecainide  Will start her flecainide 50 mg twice a day  She will call me back if he has any new problems  This was patient's 2nd episode        2  Benign essential hypertension  Patient blood pressure is upper limit of normal   Partially anxiety driven today  Continue metoprolol  Will consider adding low-dose amlodipine if blood pressure remains elevated  3  Exertional shortness of breath  Much better now  She is much more calmer less likely to be cardiac in origin in view of normal LV systolic function and nonischemic nuclear  4  History of mitral valve disorder  Echo Doppler shows no significant valvular disease no mitral valve prolapse  5  History of dyslipidemia  She is taking atorvastatin 10 mg p o  Q h s  LFTs were acceptable labs from 2019 reviewed  6  History of impaired glucose metabolism    Discussed with patient at length about flecainide, issues related to atrial fibrillation, stroke prevention, antithrombotic therapy no antidote all her questions answered  She is very well aware of it as her mother also had atrial fibrillation  Thank you for your consultation  If he have any question please call me at [phone]    Counseling :  A description of the counseling  Goals and Barriers    Patient's ability to self care: Yes  Medication side effect reviewed with patient in detail and all their questions answered to their satisfaction  Primary Care Physician: Evelyn Akers DO      HPI :     Porsche Mccartney is a 71y o  year old female who   Self-referred as she was in [facility] in the hospital with atrial fibrillation with rapid ventricular rate  Patient was at a function in [facility] when next the morning she found that she could not breathe she was taken to local hospital where she was found to be in AFib with rapid ventricular rate  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  As per patient she has a KATT guided cardioversion and she was given IV diuretics as well as Cardizem and started on Eliquis  She flew back Sunday and she has been doing well since yesterday she started feeling little lightheaded and fatigue and tired and today she came to see us in she is found to be in AFib with heart rate around 124 beats per minute  Unfortunately she has to fly tomorrow afternoon for her daughter's wedding to [place] but she is in atrial fibrillation  She is little upset with whole situation  No nausea no vomiting no PND no orthopnea  She has echo and KATT done but no stress test was done  We do not have those records available  She did bring some records from the hospital which includes labs her TSH was acceptable  Her liver enzymes were elevated  And she had probably pleural effusion as mentioned in the discharge recommendation to her  She is not taking any diuretic at this time  09/24/2019  Above reviewed  Patient came for follow-up  She did well after cardioversion she is staying in sinus rhythm  Heart rate now around 78 beats per minute  She is on low-dose of metoprolol  She is now also on Eliquis no issues with bleeding  Fatigue and tiredness has improved  No chest pain no shortness of breath no dizziness no lightheadedness  Issues related to atrial fibrillation, antithrombotic therapy  At this time she has no new complaints  Review of Systems   Constitutional: Negative for activity change, chills, diaphoresis, fever and unexpected weight change  HENT: Negative for congestion  Eyes: Negative for discharge and redness  Respiratory: Negative for cough, chest tightness, shortness of breath and wheezing  Cardiovascular: Negative  Negative for chest pain, palpitations and leg swelling  Gastrointestinal: Negative for abdominal pain, diarrhea and nausea  Endocrine: Negative  Genitourinary: Negative for decreased urine volume and urgency  Musculoskeletal: Negative  Negative for arthralgias, back pain and gait problem  Skin: Negative for rash and wound  Allergic/Immunologic: Negative  Neurological: Negative for dizziness, seizures, syncope, weakness, light-headedness and headaches  Hematological: Negative  Psychiatric/Behavioral: Negative for agitation and confusion  The patient is nervous/anxious          Historical Information   Past Medical History:   Diagnosis Date    Atrial fibrillation ([address] )     Hypertension     Mitral valve prolapse     Obesity     last assessed 4/27/2017     Past Surgical History:   Procedure Laterality Date    CATARACT EXTRACTION      B/L    HEMORRHOID SURGERY       Social History     Substance and Sexual Activity   Alcohol Use Yes    Alcohol/week: 3 0 standard drinks    Types: 3 Glasses of wine per week     Social History     Substance and Sexual Activity   Drug Use Never     Social History     Tobacco Use   Smoking Status Former Smoker    Types: Cigarettes   Smokeless Tobacco Never Used     Family History:   Family History   Problem Relation Age of Onset    Hyperlipidemia Mother     Hypertension Mother        Meds/Allergies     No Known Allergies    Current Outpatient Medications:     atorvastatin (LIPITOR) 10 mg tablet, TAKE 1 TABLET BY MOUTH  DAILY, Disp: 90 tablet, Rfl: 1    ELIQUIS 5 MG, Take 1 tablet (5 mg total) by mouth 2 (two) times a day, Disp: 180 tablet, Rfl: 3    metoprolol tartrate (LOPRESSOR) 25 mg tablet, Take 1 tablet (25 mg total) by mouth every 12 (twelve) hours, Disp: 180 tablet, Rfl: 3    flecainide (TAMBOCOR) 50 mg tablet, Take 1 tablet (50 mg total) by mouth 2 (two) times a day, Disp: 60 tablet, Rfl: 3    Vitals: Blood pressure 144/86, pulse 88, height 5' 6" (1 676 m), weight 80 3 kg (177 lb), SpO2 96 %  Body mass index is 28 57 kg/m²  Vitals:    09/24/19 1551   Weight: 80 3 kg (177 lb)     BP Readings from Last 3 Encounters:   09/24/19 144/86   06/25/19 170/94   05/17/19 140/80         Physical Exam   Constitutional: She is oriented to person, place, and time  She appears well-developed and well-nourished  No distress  HENT:   Head: Normocephalic and atraumatic  Eyes: Pupils are equal, round, and reactive to light  Neck: Neck supple  No JVD present  No tracheal deviation present  No thyromegaly present  Cardiovascular: Normal rate, regular rhythm, S1 normal, S2 normal and intact distal pulses  Exam reveals no gallop, no S3, no S4, no distant heart sounds and no friction rub  Murmur heard  Systolic (ejection) murmur is present with a grade of 2/6  S1-S2 regular, 2/6 ejection systolic murmur   Pulmonary/Chest: Effort normal and breath sounds normal  No respiratory distress  She has no wheezes  She has no rales  She exhibits no tenderness  Abdominal: Soft  Bowel sounds are normal  She exhibits no distension  There is no tenderness  Musculoskeletal: She exhibits no edema or deformity  Neurological: She is alert and oriented to person, place, and time  Skin: Skin is warm and dry  No rash noted  She is not diaphoretic  No pallor  Psychiatric: She has a normal mood and affect   Her behavior is normal  Judgment normal          Diagnostic Studies Review Cardio:  Echo stress test reviewed    EKG:      Twelve lead EKG done in our office on 04/23/2019 shows atrial fibrillation with rapid ventricular rate heart rate 124 beats per minute  Twelve lead EKG done in our office 05/17/2019 shows normal sinus rhythm heart rate 58 beats per minute  Left atrial enlargement  Incomplete RBBB  As compared to old EKG patient is now in sinus rhythm  Twelve lead EKG done 09/24/2019 shows normal sinus rhythm RSR pattern heart rate 78 beats per minute no significant change from old EKG patient is staying in sinus rhythm  Imaging:    Lab Review     BMP:  Lab Results   Component Value Date    SODIUM 141 04/24/2019    K 3 7 04/24/2019     04/24/2019    CO2 23 04/24/2019    BUN 19 04/24/2019    CREATININE 0 90 04/24/2019    GLUC 102 04/24/2019    CALCIUM 9 1 04/24/2019    EGFR 68 04/24/2019     LFT:  Lab Results   Component Value Date    AST 23 04/24/2019    ALT 57 04/24/2019    ALKPHOS 97 04/24/2019    TP 6 9 04/24/2019    ALB 3 7 04/24/2019      No results found for: [facility]  Lab Results   Component Value Date    HGBA1C 5 5 05/26/2018     Lipid Profile:   Lab Results   Component Value Date    CHOLESTEROL 221 (H) 05/26/2018    HDL 66 05/26/2018    LDLCALC 132 (H) 06/29/2017    TRIG 104 05/26/2018     Lab Results   Component Value Date    CHOLESTEROL 221 (H) 05/26/2018         Dr Bharath Ruvalcaba MD [facility]      "This note has been constructed using a voice recognition system  Therefore there may be syntax, spelling, and/or grammatical errors   Please call if you have any questions  "

## 2019-09-24 ENCOUNTER — OFFICE VISIT (OUTPATIENT)
Dept: CARDIOLOGY CLINIC | Facility: CLINIC | Age: 64
End: 2019-09-24
Payer: COMMERCIAL

## 2019-09-24 VITALS
BODY MASS INDEX: 28.45 KG/M2 | SYSTOLIC BLOOD PRESSURE: 144 MMHG | HEART RATE: 88 BPM | OXYGEN SATURATION: 96 % | WEIGHT: 177 LBS | HEIGHT: 66 IN | DIASTOLIC BLOOD PRESSURE: 86 MMHG

## 2019-09-24 DIAGNOSIS — R73.01 IFG (IMPAIRED FASTING GLUCOSE): ICD-10-CM

## 2019-09-24 DIAGNOSIS — I10 BENIGN ESSENTIAL HYPERTENSION: ICD-10-CM

## 2019-09-24 DIAGNOSIS — I48.0 PAROXYSMAL ATRIAL FIBRILLATION (HCC): ICD-10-CM

## 2019-09-24 DIAGNOSIS — R53.83 OTHER FATIGUE: ICD-10-CM

## 2019-09-24 DIAGNOSIS — I05.9 MITRAL VALVE DISORDER: ICD-10-CM

## 2019-09-24 PROCEDURE — 99214 OFFICE O/P EST MOD 30 MIN: CPT | Performed by: INTERNAL MEDICINE

## 2019-09-24 PROCEDURE — 93000 ELECTROCARDIOGRAM COMPLETE: CPT | Performed by: INTERNAL MEDICINE

## 2019-09-24 RX ORDER — FLECAINIDE ACETATE 50 MG/1
50 TABLET ORAL 2 TIMES DAILY
Qty: 60 TABLET | Refills: 3 | Status: SHIPPED | OUTPATIENT
Start: 2019-09-24 | End: 2020-01-27

## 2019-10-02 ENCOUNTER — TRANSCRIBE ORDERS (OUTPATIENT)
Dept: ADMINISTRATIVE | Facility: HOSPITAL | Age: 64
End: 2019-10-02

## 2019-10-02 ENCOUNTER — TELEPHONE (OUTPATIENT)
Dept: FAMILY MEDICINE CLINIC | Facility: CLINIC | Age: 64
End: 2019-10-02

## 2019-10-02 DIAGNOSIS — Z12.31 VISIT FOR SCREENING MAMMOGRAM: Primary | ICD-10-CM

## 2019-10-02 DIAGNOSIS — Z12.39 BREAST CANCER SCREENING: Primary | ICD-10-CM

## 2019-10-02 NOTE — TELEPHONE ENCOUNTER
katherine called for an order for her routine mammo  She is already scheduled    Please call patient when order is ready for    Hawk aCrdona can be reached at [phone]

## 2019-10-24 ENCOUNTER — HOSPITAL ENCOUNTER (OUTPATIENT)
Dept: RADIOLOGY | Facility: HOSPITAL | Age: 64
Discharge: HOME | End: 2019-10-24
Attending: FAMILY MEDICINE
Payer: COMMERCIAL

## 2019-10-24 ENCOUNTER — TELEPHONE (OUTPATIENT)
Dept: RADIOLOGY | Facility: HOSPITAL | Age: 64
End: 2019-10-24

## 2019-10-24 VITALS — WEIGHT: 175 LBS | HEIGHT: 66 IN | BODY MASS INDEX: 28.12 KG/M2

## 2019-10-24 DIAGNOSIS — Z12.31 VISIT FOR SCREENING MAMMOGRAM: ICD-10-CM

## 2019-10-24 PROCEDURE — 77067 SCR MAMMO BI INCL CAD: CPT

## 2019-10-24 PROCEDURE — 77063 BREAST TOMOSYNTHESIS BI: CPT

## 2019-11-05 DIAGNOSIS — E78.5 HYPERLIPIDEMIA LDL GOAL <130: ICD-10-CM

## 2019-11-05 RX ORDER — ATORVASTATIN CALCIUM 10 MG/1
10 TABLET, FILM COATED ORAL DAILY
Qty: 90 TABLET | Refills: 0 | Status: SHIPPED | OUTPATIENT
Start: 2019-11-05 | End: 2019-12-09 | Stop reason: SDUPTHER

## 2019-11-07 ENCOUNTER — ANESTHESIA EVENT (OUTPATIENT)
Dept: NON INVASIVE DIAGNOSTICS | Facility: HOSPITAL | Age: 64
End: 2019-11-07

## 2019-11-07 ENCOUNTER — HOSPITAL ENCOUNTER (OUTPATIENT)
Dept: NON INVASIVE DIAGNOSTICS | Facility: HOSPITAL | Age: 64
Discharge: HOME | End: 2019-11-07
Attending: INTERNAL MEDICINE | Admitting: INTERNAL MEDICINE
Payer: COMMERCIAL

## 2019-11-07 ENCOUNTER — TELEPHONE (OUTPATIENT)
Dept: CARDIOLOGY CLINIC | Facility: CLINIC | Age: 64
End: 2019-11-07

## 2019-11-07 ENCOUNTER — OFFICE VISIT (OUTPATIENT)
Dept: CARDIOLOGY CLINIC | Facility: CLINIC | Age: 64
End: 2019-11-07
Payer: COMMERCIAL

## 2019-11-07 ENCOUNTER — ANESTHESIA (OUTPATIENT)
Dept: NON INVASIVE DIAGNOSTICS | Facility: HOSPITAL | Age: 64
End: 2019-11-07

## 2019-11-07 VITALS
OXYGEN SATURATION: 98 % | BODY MASS INDEX: 27.8 KG/M2 | HEART RATE: 119 BPM | DIASTOLIC BLOOD PRESSURE: 70 MMHG | WEIGHT: 173 LBS | HEIGHT: 66 IN | SYSTOLIC BLOOD PRESSURE: 130 MMHG

## 2019-11-07 VITALS
SYSTOLIC BLOOD PRESSURE: 114 MMHG | RESPIRATION RATE: 18 BRPM | OXYGEN SATURATION: 96 % | HEART RATE: 58 BPM | DIASTOLIC BLOOD PRESSURE: 72 MMHG

## 2019-11-07 DIAGNOSIS — E78.5 DYSLIPIDEMIA: ICD-10-CM

## 2019-11-07 DIAGNOSIS — I48.91 ATRIAL FIBRILLATION WITH CONTROLLED VENTRICULAR RATE (HCC): ICD-10-CM

## 2019-11-07 DIAGNOSIS — I10 BENIGN ESSENTIAL HYPERTENSION: ICD-10-CM

## 2019-11-07 DIAGNOSIS — R06.02 EXERTIONAL SHORTNESS OF BREATH: ICD-10-CM

## 2019-11-07 DIAGNOSIS — R00.2 PALPITATION: ICD-10-CM

## 2019-11-07 DIAGNOSIS — I48.0 PAROXYSMAL ATRIAL FIBRILLATION (HCC): ICD-10-CM

## 2019-11-07 PROCEDURE — 3075F SYST BP GE 130 - 139MM HG: CPT | Performed by: INTERNAL MEDICINE

## 2019-11-07 PROCEDURE — 92960 CARDIOVERSION ELECTRIC EXT: CPT

## 2019-11-07 PROCEDURE — 92960 CARDIOVERSION ELECTRIC EXT: CPT | Performed by: INTERNAL MEDICINE

## 2019-11-07 PROCEDURE — 93000 ELECTROCARDIOGRAM COMPLETE: CPT | Performed by: INTERNAL MEDICINE

## 2019-11-07 PROCEDURE — 99215 OFFICE O/P EST HI 40 MIN: CPT | Performed by: INTERNAL MEDICINE

## 2019-11-07 PROCEDURE — 3078F DIAST BP <80 MM HG: CPT | Performed by: INTERNAL MEDICINE

## 2019-11-07 RX ORDER — PROPOFOL 10 MG/ML
INJECTION, EMULSION INTRAVENOUS AS NEEDED
Status: DISCONTINUED | OUTPATIENT
Start: 2019-11-07 | End: 2019-11-07 | Stop reason: SURG

## 2019-11-07 RX ORDER — SODIUM CHLORIDE 9 MG/ML
INJECTION, SOLUTION INTRAVENOUS CONTINUOUS PRN
Status: DISCONTINUED | OUTPATIENT
Start: 2019-11-07 | End: 2019-11-07 | Stop reason: SURG

## 2019-11-07 RX ORDER — LIDOCAINE HYDROCHLORIDE 10 MG/ML
INJECTION, SOLUTION INFILTRATION; PERINEURAL AS NEEDED
Status: DISCONTINUED | OUTPATIENT
Start: 2019-11-07 | End: 2019-11-07 | Stop reason: SURG

## 2019-11-07 RX ADMIN — SODIUM CHLORIDE: 0.9 INJECTION, SOLUTION INTRAVENOUS at 12:14

## 2019-11-07 RX ADMIN — PROPOFOL 60 MG: 10 INJECTION, EMULSION INTRAVENOUS at 12:28

## 2019-11-07 RX ADMIN — LIDOCAINE HYDROCHLORIDE 60 MG: 10 INJECTION, SOLUTION INFILTRATION; PERINEURAL at 12:28

## 2019-11-07 NOTE — PROGRESS NOTES
Consultation - Cardiology Office  [facility]  Staci Dasilva 71 y o  female MRN: [id number]  : 1955  Unit/Bed#:  Encounter: [id number]      Assessment:     1  Atrial fibrillation with controlled ventricular rate (HCC)    2  Paroxysmal atrial fibrillation ([address] )    3  Exertional shortness of breath    4  Benign essential hypertension    5  Dyslipidemia    6  Palpitation        Discussion summary and Plan:      1  Atrial fibrillation with rapid ventricular rate with history of Paroxysmal atrial fibrillation  Patient went back into atrial fibrillation with rapid ventricular rate  Her heart rate is 120-150 beats per minute  She was not taking flecainide as prescribed  She was taking low-dose metoprolol  She is NPO today  She has been compliant with Eliquis  Advised patient to be compliant with flecainide  Will give flecainide 100 mg 1 dose now  Increase metoprolol to 25 mg 3 times daily  She will be scheduled for cardioversion as she has not palpitations and symptomatic with symptoms of shortness of breath  If he continues to have reoccurrence of atrial fibrillation and symptomatic she may need ablation therapy  2  Exertion shortness of breath palpitation  Since last night patient is having palpitations and shortness of breath most likely AFib with rapid ventricular rate  She is compliant with antithrombotic therapy she will be scheduled for cardioversion as she is NPO and she has not eaten this morning  3  Benign essential hypertension  Patient blood pressure has been acceptable  Continue increased dose of metoprolol  4  History of mitral valve disorder  Echo Doppler shows no significant valvular disease no mitral valve prolapse  Echo finding reviewed with her  She had a negative stress test also  5  History of dyslipidemia  Continue statin     6   History of impaired glucose metabolism    Discussed with patient at length about flecainide, issues related to atrial fibrillation, stroke prevention, antithrombotic therapy no antidote all her questions answered  She need to be compliant with medication otherwise high risk for reoccurrence of atrial fibrillation  Thank you for your consultation  If he have any question please call me at [phone]    Counseling :  A description of the counseling  Goals and Barriers  Patient's ability to self care: Yes  Medication side effect reviewed with patient in detail and all their questions answered to their satisfaction  Primary Care Physician: Adam Caballero DO      HPI :     Trice Wing is a 71y o  year old female who   Self-referred as she was in [facility] in the hospital with atrial fibrillation with rapid ventricular rate  Patient was at a function in [facility] when next the morning she found that she could not breathe she was taken to local hospital where she was found to be in AFib with rapid ventricular rate  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  As per patient she has a KATT guided cardioversion and she was given IV diuretics as well as Cardizem and started on Eliquis  She flew back Sunday and she has been doing well since yesterday she started feeling little lightheaded and fatigue and tired and today she came to see us in she is found to be in AFib with heart rate around 124 beats per minute  Unfortunately she has to fly tomorrow afternoon for her daughter's wedding to [place] but she is in atrial fibrillation  She is little upset with whole situation  No nausea no vomiting no PND no orthopnea  She has echo and KATT done but no stress test was done  We do not have those records available  She did bring some records from the hospital which includes labs her TSH was acceptable  Her liver enzymes were elevated  And she had probably pleural effusion as mentioned in the discharge recommendation to her    She is not taking any diuretic at this time  11/07/2019  Above reviewed patient came for follow-up  She did well after cardioversion but last night she noted her heart rate was racing  She found she was in AFib with rapid ventricular rate  She did not take flecainide because of fear of side effects  She has it at home  She has not eaten today she is feeling some exertional shortness of breath and palpitations denies any chest pain  She had previously normal LV function and stress test   She has been compliant with her Eliquis  No fever no chills no nausea no vomiting no other significant complaint  Review of Systems   Constitutional: Positive for activity change  Negative for chills, diaphoresis, fever and unexpected weight change  HENT: Negative for congestion  Eyes: Negative for discharge and redness  Respiratory: Positive for shortness of breath  Negative for cough, chest tightness and wheezing  Cardiovascular: Positive for palpitations  Negative for chest pain and leg swelling  Gastrointestinal: Negative for abdominal pain, diarrhea and nausea  Endocrine: Negative  Genitourinary: Negative for decreased urine volume and urgency  Musculoskeletal: Negative  Negative for arthralgias, back pain and gait problem  Skin: Negative for rash and wound  Allergic/Immunologic: Negative  Neurological: Negative for dizziness, seizures, syncope, weakness, light-headedness and headaches  Hematological: Negative  Psychiatric/Behavioral: Negative for agitation and confusion  The patient is nervous/anxious          Historical Information   Past Medical History:   Diagnosis Date    Atrial fibrillation ([address] )     Hypertension     Mitral valve prolapse     Obesity     last assessed 4/27/2017     Past Surgical History:   Procedure Laterality Date    CATARACT EXTRACTION      B/L    HEMORRHOID SURGERY       Social History     Substance and Sexual Activity   Alcohol Use Yes    Alcohol/week: 3 0 standard drinks    Types: 3 Glasses of wine per week     Social History     Substance and Sexual Activity   Drug Use Never     Social History     Tobacco Use   Smoking Status Former Smoker    Types: Cigarettes   Smokeless Tobacco Never Used     Family History:   Family History   Problem Relation Age of Onset    Hyperlipidemia Mother     Hypertension Mother     No Known Problems Father     No Known Problems Sister     No Known Problems Daughter     No Known Problems Maternal Grandmother     No Known Problems Paternal Grandmother     No Known Problems Sister     No Known Problems Daughter     Pancreatic cancer Maternal Aunt     No Known Problems Maternal Aunt     No Known Problems Maternal Aunt     No Known Problems Maternal Aunt     No Known Problems Paternal Aunt     No Known Problems Paternal Aunt     No Known Problems Paternal Aunt        Meds/Allergies     No Known Allergies    Current Outpatient Medications:     atorvastatin (LIPITOR) 10 mg tablet, Take 1 tablet (10 mg total) by mouth daily, Disp: 90 tablet, Rfl: 0    ELIQUIS 5 MG, Take 1 tablet (5 mg total) by mouth 2 (two) times a day, Disp: 180 tablet, Rfl: 3    metoprolol tartrate (LOPRESSOR) 25 mg tablet, Take 1 tablet (25 mg total) by mouth every 12 (twelve) hours, Disp: 180 tablet, Rfl: 3    flecainide (TAMBOCOR) 50 mg tablet, Take 1 tablet (50 mg total) by mouth 2 (two) times a day (Patient not taking: Reported on 11/7/2019), Disp: 60 tablet, Rfl: 3    Vitals: Blood pressure 130/70, pulse (!) 119, height 5' 6" (1 676 m), weight 78 5 kg (173 lb), SpO2 98 %  Body mass index is 27 92 kg/m²  Vitals:    11/07/19 0904   Weight: 78 5 kg (173 lb)     BP Readings from Last 3 Encounters:   11/07/19 130/70   09/24/19 144/86   06/25/19 170/94         Physical Exam   Constitutional: She is oriented to person, place, and time  She appears well-developed and well-nourished  No distress  HENT:   Head: Normocephalic and atraumatic     Eyes: Pupils are equal, round, and reactive to light  Neck: Neck supple  No JVD present  No thyromegaly present  Cardiovascular: Normal rate, S1 normal, S2 normal and intact distal pulses  An irregularly irregular rhythm present  Exam reveals no gallop, no S3, no S4, no distant heart sounds and no friction rub  Murmur heard  Systolic murmur is present  Tachycardic   Pulmonary/Chest: Effort normal and breath sounds normal  No respiratory distress  She has no wheezes  She has no rales  She exhibits no tenderness  Clear to auscultation   Abdominal: Soft  She exhibits no distension  There is no tenderness  Musculoskeletal: She exhibits no edema or deformity  Lymphadenopathy:     She has no cervical adenopathy  Neurological: She is alert and oriented to person, place, and time  Skin: Skin is warm and dry  No rash noted  She is not diaphoretic  No pallor  Psychiatric: She has a normal mood and affect  Judgment normal          Diagnostic Studies Review Cardio:  Echo stress test reviewed    EKG:      Twelve lead EKG done in our office on 04/23/2019 shows atrial fibrillation with rapid ventricular rate heart rate 124 beats per minute  Twelve lead EKG done in our office 05/17/2019 shows normal sinus rhythm heart rate 58 beats per minute  Left atrial enlargement  Incomplete RBBB  As compared to old EKG patient is now in sinus rhythm  Twelve lead EKG done 09/24/2019 shows normal sinus rhythm RSR pattern heart rate 78 beats per minute no significant change from old EKG patient is staying in sinus rhythm  Twelve lead EKG shows atrial fibrillation with heart rate 139 beats per minute  As compared to previous EKG she is now in atrial fibrillation      Imaging:    Lab Review     BMP:  Lab Results   Component Value Date    SODIUM 141 04/24/2019    K 3 7 04/24/2019     04/24/2019    CO2 23 04/24/2019    BUN 19 04/24/2019    CREATININE 0 90 04/24/2019    GLUC 102 04/24/2019    CALCIUM 9 1 04/24/2019 EGFR 68 04/24/2019     LFT:  Lab Results   Component Value Date    AST 23 04/24/2019    ALT 57 04/24/2019    ALKPHOS 97 04/24/2019    TP 6 9 04/24/2019    ALB 3 7 04/24/2019      No results found for: [facility]  Lab Results   Component Value Date    HGBA1C 5 5 05/26/2018     Lipid Profile:   Lab Results   Component Value Date    CHOLESTEROL 221 (H) 05/26/2018    HDL 66 05/26/2018    LDLCALC 132 (H) 06/29/2017    TRIG 104 05/26/2018     Lab Results   Component Value Date    CHOLESTEROL 221 (H) 05/26/2018         Dr Lars Araiza MD [facility]      "This note has been constructed using a voice recognition system  Therefore there may be syntax, spelling, and/or grammatical errors   Please call if you have any questions  "

## 2019-11-07 NOTE — PROCEDURES
Renetta Vargas  [id number]  11/7/2019  Ezequiel Fernandez DO      PRE-OP DIAGNOSIS: Persistant atrial fibrillation      POST-OP DIAGNOSIS: Successful electrical cardioversion of atrial fibrillation to normal sinus rhythm with PACs  : Dr Margi Gutierrez MD  [facility]    ANESTHESIA: Propofol given by anesthesiology  COMPLICATIONS: None  OPERATIVE TERM: DC cardioversion  The nature of the procedure, risks and alternatives were discussed with the patient who gave informed consent  Pt  and family understands risks associated with procedure and complication of stroke etc  were discussed with them in detail  OPERATIVE TECHNIQUE: The patient was sedated with propofol  When the patient was no longer responsive to quiet voice, DC cardioversion was performed with 150 J biphasically and synchronously  The patient was then monitored until fully alert and left the procedure area in stable condition  IMPRESSION: Successful DC cardioversion of atrial fibrillation to normal sinus rhythm with PACs  Margi Gutierrez MD [facility]      "This note has been constructed using a voice recognition system  Therefore there may be syntax, spelling, and/or grammatical errors   Please call if you have any questions  "

## 2019-11-07 NOTE — ANESTHESIA POSTPROCEDURE EVALUATION
Post-Op Assessment Note    CV Status:  Stable  Pain Score: 0    Pain management: adequate     Mental Status:  Alert and awake   Hydration Status:  Euvolemic   PONV Controlled:  Controlled   Airway Patency:  Patent   Post Op Vitals Reviewed: Yes      Staff: CRNA           BP  121/89   Temp      Pulse 60   Resp   16   SpO2   100

## 2019-11-07 NOTE — SEDATION DOCUMENTATION
Procedure ended  Patient successfully cardioverted into normal sinus rhythm  Patient tolerated well  Discharged instructions reviewed with patient and daughter  Patient states understanding and discharged home in stable condition

## 2019-11-07 NOTE — TELEPHONE ENCOUNTER
Patient called today stating that she is back in afib as of last night  She denies any associated symptoms  NO SOB, no Palp, no CP  Dr David Chou will see her today

## 2019-11-07 NOTE — ANESTHESIA PREPROCEDURE EVALUATION
Review of Systems/Medical History  Patient summary reviewed  Chart reviewed  No history of anesthetic complications     Cardiovascular  EKG reviewed, Exercise tolerance (METS): >4,  Hyperlipidemia, Hypertension controlled, Dysrhythmias , atrial fibrillation,   Comment: Stress TEST:   ECG conclusions: The stress ECG was normal   -  Perfusion imaging: There was a small, mildly severe, paradoxical (reverse redistribution) myocardial perfusion defect of the anterior wall likely due to attenuation from breast tissue   -  Gated SPECT: The calculated left ventricular ejection fraction was 68 %  Left ventricular ejection fraction was within normal limits by visual estimate  There was no left ventricular regional abnormality      IMPRESSIONS: Normal study after maximal exercise  Myocardial perfusion imaging was normal at rest and with stress  Left ventricular systolic function was normal ,  Pulmonary  Shortness of breath,   Comment: No shortness of breath today     GI/Hepatic  Negative GI/hepatic ROS               Endo/Other    Comment: Impaired fasting glucose    GYN  Negative gynecology ROS          Hematology  Negative hematology ROS   Coagulation disorder currently taking oral anticoagulants,   Comment: Currently on eliquis Musculoskeletal  Negative musculoskeletal ROS        Neurology  Negative neurology ROS      Psychology           Physical Exam    Airway    Mallampati score: II  TM Distance: >3 FB  Neck ROM: full     Dental       Cardiovascular  Rhythm: irregular, Rate: abnormal,     Pulmonary  Breath sounds clear to auscultation,     Other Findings  Teeth intact      Anesthesia Plan  ASA Score- 2     Anesthesia Type- IV sedation with anesthesia with ASA Monitors     Additional Monitors:   Airway Plan:     Comment: Lab Results       Component                Value               Date                       WBC                      6 76                04/24/2019                 HGB                      14 6 04/24/2019                 PLT                      247                 04/24/2019            Lab Results       Component                Value               Date                       SODIUM                   141                 04/24/2019                 K                        3 7                 04/24/2019                 BUN                      19                  04/24/2019                 CREATININE               0 90                04/24/2019                 EGFR                     68                  04/24/2019                 GLUCOSE                  108 (H)             06/29/2017            No results found for: PTT   No results found for: INR    Blood type    Lab Results       Component                Value               Date                       HGBA1C                   5 5                 05/26/2018            Plan Factors-Patient not instructed to abstain from smoking on day of procedure       Induction-     Postoperative Plan-     Informed Consent- Anesthetic plan and risks discussed with patient

## 2019-11-14 ENCOUNTER — HOSPITAL ENCOUNTER (OUTPATIENT)
Dept: RADIOLOGY | Facility: HOSPITAL | Age: 64
Discharge: HOME | End: 2019-11-14
Attending: FAMILY MEDICINE
Payer: COMMERCIAL

## 2019-11-14 VITALS — HEIGHT: 66 IN | BODY MASS INDEX: 27.8 KG/M2 | WEIGHT: 173 LBS

## 2019-11-14 DIAGNOSIS — R92.8 ABNORMAL MAMMOGRAM: ICD-10-CM

## 2019-11-14 PROCEDURE — 76642 ULTRASOUND BREAST LIMITED: CPT

## 2019-11-14 PROCEDURE — 77065 DX MAMMO INCL CAD UNI: CPT

## 2019-11-14 PROCEDURE — G0279 TOMOSYNTHESIS, MAMMO: HCPCS

## 2019-12-09 DIAGNOSIS — E78.5 HYPERLIPIDEMIA LDL GOAL <130: ICD-10-CM

## 2019-12-09 RX ORDER — ATORVASTATIN CALCIUM 10 MG/1
TABLET, FILM COATED ORAL
Qty: 90 TABLET | Refills: 0 | Status: SHIPPED | OUTPATIENT
Start: 2019-12-09 | End: 2020-03-04

## 2019-12-18 ENCOUNTER — OFFICE VISIT (OUTPATIENT)
Dept: URGENT CARE | Facility: CLINIC | Age: 64
End: 2019-12-18
Payer: COMMERCIAL

## 2019-12-18 VITALS
DIASTOLIC BLOOD PRESSURE: 90 MMHG | BODY MASS INDEX: 28.93 KG/M2 | HEART RATE: 65 BPM | WEIGHT: 180 LBS | OXYGEN SATURATION: 98 % | RESPIRATION RATE: 16 BRPM | SYSTOLIC BLOOD PRESSURE: 160 MMHG | TEMPERATURE: 98.2 F | HEIGHT: 66 IN

## 2019-12-18 DIAGNOSIS — R04.0 EPISTAXIS: Primary | ICD-10-CM

## 2019-12-18 PROCEDURE — 99212 OFFICE O/P EST SF 10 MIN: CPT | Performed by: FAMILY MEDICINE

## 2019-12-18 NOTE — PROGRESS NOTES
[facility]        NAME: Vitor Mehta is a 71 y o  female  : 1955    MRN: [id number]  DATE: 2019  TIME: 8:37 AM    Assessment and Plan   Epistaxis [R04 0]  1  Epistaxis       Nose bleeding has resolved  Advised on moisturized in the nasal cavity with Vaseline via Q-tip  May also use Ayr saline gel  Patient Instructions     Follow up with PCP in 3-5 days  Proceed to  ER if symptoms worsen  Chief Complaint     Chief Complaint   Patient presents with    Nose Bleed     Patient complains of a nose bleed that would not stop this morning, bleeding has subsided just a few minutes ago  Patient is taking eliquis daily  History of Present Illness       79-year-old female on Eliquis for atrial fibrillation presents today with new onset epistaxis from the left nostril which started about 40 minutes ago and just recently decreased  Started on Eliquis about 8 months ago as she was 1st diagnosed with AFib  Reports that it has been awhile since she has had a nosebleed  Denies any headaches or dizziness  Review of Systems   Review of Systems   Constitutional: Negative for chills and fever  HENT: Positive for nosebleeds  Respiratory: Negative for shortness of breath  Cardiovascular: Negative for chest pain  Gastrointestinal: Negative for abdominal pain and nausea  Neurological: Negative for dizziness and headaches           Current Medications       Current Outpatient Medications:     atorvastatin (LIPITOR) 10 mg tablet, TAKE 1 TABLET BY MOUTH  DAILY, Disp: 90 tablet, Rfl: 0    ELIQUIS 5 MG, Take 1 tablet (5 mg total) by mouth 2 (two) times a day, Disp: 180 tablet, Rfl: 3    flecainide (TAMBOCOR) 50 mg tablet, Take 1 tablet (50 mg total) by mouth 2 (two) times a day, Disp: 60 tablet, Rfl: 3    metoprolol tartrate (LOPRESSOR) 25 mg tablet, Take 1 tablet (25 mg total) by mouth every 12 (twelve) hours, Disp: 180 tablet, Rfl: 3    Current Allergies     Allergies as of 12/18/2019    (No Known Allergies)            The following portions of the patient's history were reviewed and updated as appropriate: allergies, current medications, past family history, past medical history, past social history, past surgical history and problem list      Past Medical History:   Diagnosis Date    Atrial fibrillation ([address] )     Hypertension     Mitral valve prolapse     Obesity     last assessed 4/27/2017       Past Surgical History:   Procedure Laterality Date    CATARACT EXTRACTION      B/L    HEMORRHOID SURGERY         Family History   Problem Relation Age of Onset    Hyperlipidemia Mother     Hypertension Mother     No Known Problems Father     No Known Problems Sister     No Known Problems Daughter     No Known Problems Maternal Grandmother     No Known Problems Paternal Grandmother     No Known Problems Sister     No Known Problems Daughter     Pancreatic cancer Maternal Aunt     No Known Problems Maternal Aunt     No Known Problems Maternal Aunt     No Known Problems Maternal Aunt     No Known Problems Paternal Aunt     No Known Problems Paternal Aunt     No Known Problems Paternal Aunt          Medications have been verified  Objective   /90 (BP Location: Left arm, Patient Position: Sitting, Cuff Size: Standard)   Pulse 65   Temp 98 2 °F (36 8 °C) (Tympanic)   Resp 16   Ht 5' 6" (1 676 m)   Wt 81 6 kg (180 lb)   SpO2 98%   BMI 29 05 kg/m²        Physical Exam     Physical Exam   Constitutional: She is oriented to person, place, and time  She appears well-developed and well-nourished  No distress  HENT:   Head: Normocephalic and atraumatic  Nose: Nose normal    Mouth/Throat: Oropharynx is clear and moist  No oropharyngeal exudate  Inflamed nasal mucosa  No active bleeding out of either nostril  Eyes: Conjunctivae are normal  Right eye exhibits no discharge  Left eye exhibits no discharge     Pulmonary/Chest: Effort normal  Neurological: She is alert and oriented to person, place, and time  Skin: Skin is warm  She is not diaphoretic  No erythema  Psychiatric: She has a normal mood and affect  Her behavior is normal  Judgment and thought content normal    Nursing note and vitals reviewed

## 2019-12-30 ENCOUNTER — TELEPHONE (OUTPATIENT)
Dept: FAMILY MEDICINE CLINIC | Facility: CLINIC | Age: 64
End: 2019-12-30

## 2019-12-30 DIAGNOSIS — J30.9 ALLERGIC RHINITIS, UNSPECIFIED SEASONALITY, UNSPECIFIED TRIGGER: Primary | ICD-10-CM

## 2019-12-30 RX ORDER — OXYMETAZOLINE HYDROCHLORIDE 0.05 G/100ML
2 SPRAY NASAL 2 TIMES DAILY
Qty: 30 ML | Refills: 5 | Status: SHIPPED | OUTPATIENT
Start: 2019-12-30 | End: 2020-09-10

## 2020-01-20 ENCOUNTER — TELEPHONE (OUTPATIENT)
Dept: CARDIOLOGY CLINIC | Facility: CLINIC | Age: 65
End: 2020-01-20

## 2020-01-20 ENCOUNTER — HOSPITAL ENCOUNTER (EMERGENCY)
Facility: HOSPITAL | Age: 65
Discharge: HOME | End: 2020-01-20
Attending: EMERGENCY MEDICINE | Admitting: EMERGENCY MEDICINE
Payer: COMMERCIAL

## 2020-01-20 ENCOUNTER — APPOINTMENT (EMERGENCY)
Dept: RADIOLOGY | Facility: HOSPITAL | Age: 65
End: 2020-01-20
Payer: COMMERCIAL

## 2020-01-20 VITALS
SYSTOLIC BLOOD PRESSURE: 153 MMHG | WEIGHT: 180 LBS | OXYGEN SATURATION: 93 % | TEMPERATURE: 97.7 F | HEART RATE: 54 BPM | RESPIRATION RATE: 18 BRPM | DIASTOLIC BLOOD PRESSURE: 78 MMHG | BODY MASS INDEX: 29.05 KG/M2

## 2020-01-20 DIAGNOSIS — I48.0 PAF (PAROXYSMAL ATRIAL FIBRILLATION) (HCC): ICD-10-CM

## 2020-01-20 DIAGNOSIS — R00.2 PALPITATIONS: Primary | ICD-10-CM

## 2020-01-20 LAB
ALBUMIN SERPL BCP-MCNC: 3.9 G/DL (ref 3.5–5)
ALP SERPL-CCNC: 90 U/L (ref 46–116)
ALT SERPL W P-5'-P-CCNC: 42 U/L (ref 12–78)
ANION GAP SERPL CALCULATED.3IONS-SCNC: 7 MMOL/L (ref 4–13)
APTT PPP: 29 SECONDS (ref 23–37)
AST SERPL W P-5'-P-CCNC: 25 U/L (ref 5–45)
ATRIAL RATE: 60 BPM
BASOPHILS # BLD AUTO: 0.02 THOUSANDS/ΜL (ref 0–0.1)
BASOPHILS NFR BLD AUTO: 0 % (ref 0–1)
BILIRUB SERPL-MCNC: 0.4 MG/DL (ref 0.2–1)
BUN SERPL-MCNC: 18 MG/DL (ref 5–25)
CALCIUM SERPL-MCNC: 9 MG/DL (ref 8.3–10.1)
CHLORIDE SERPL-SCNC: 106 MMOL/L (ref 100–108)
CO2 SERPL-SCNC: 28 MMOL/L (ref 21–32)
CREAT SERPL-MCNC: 0.82 MG/DL (ref 0.6–1.3)
EOSINOPHIL # BLD AUTO: 0.08 THOUSAND/ΜL (ref 0–0.61)
EOSINOPHIL NFR BLD AUTO: 1 % (ref 0–6)
ERYTHROCYTE [DISTWIDTH] IN BLOOD BY AUTOMATED COUNT: 12.9 % (ref 11.6–15.1)
GFR SERPL CREATININE-BSD FRML MDRD: 75 ML/MIN/1.73SQ M
GLUCOSE SERPL-MCNC: 113 MG/DL (ref 65–140)
HCT VFR BLD AUTO: 44 % (ref 34.8–46.1)
HGB BLD-MCNC: 14.6 G/DL (ref 11.5–15.4)
IMM GRANULOCYTES # BLD AUTO: 0.01 THOUSAND/UL (ref 0–0.2)
IMM GRANULOCYTES NFR BLD AUTO: 0 % (ref 0–2)
INR PPP: 0.98 (ref 0.91–1.09)
LYMPHOCYTES # BLD AUTO: 1.01 THOUSANDS/ΜL (ref 0.6–4.47)
LYMPHOCYTES NFR BLD AUTO: 18 % (ref 14–44)
MCH RBC QN AUTO: 29.1 PG (ref 26.8–34.3)
MCHC RBC AUTO-ENTMCNC: 33.2 G/DL (ref 31.4–37.4)
MCV RBC AUTO: 88 FL (ref 82–98)
MONOCYTES # BLD AUTO: 0.39 THOUSAND/ΜL (ref 0.17–1.22)
MONOCYTES NFR BLD AUTO: 7 % (ref 4–12)
NEUTROPHILS # BLD AUTO: 4.19 THOUSANDS/ΜL (ref 1.85–7.62)
NEUTS SEG NFR BLD AUTO: 74 % (ref 43–75)
NRBC BLD AUTO-RTO: 0 /100 WBCS
P AXIS: 53 DEGREES
PLATELET # BLD AUTO: 213 THOUSANDS/UL (ref 149–390)
PMV BLD AUTO: 9.4 FL (ref 8.9–12.7)
POTASSIUM SERPL-SCNC: 3.9 MMOL/L (ref 3.5–5.3)
PR INTERVAL: 168 MS
PROT SERPL-MCNC: 7.1 G/DL (ref 6.4–8.2)
PROTHROMBIN TIME: 10.6 SECONDS (ref 9.8–12)
QRS AXIS: -29 DEGREES
QRSD INTERVAL: 96 MS
QT INTERVAL: 438 MS
QTC INTERVAL: 438 MS
RBC # BLD AUTO: 5.01 MILLION/UL (ref 3.81–5.12)
SODIUM SERPL-SCNC: 141 MMOL/L (ref 136–145)
T WAVE AXIS: 18 DEGREES
TROPONIN I SERPL-MCNC: <0.02 NG/ML
VENTRICULAR RATE: 60 BPM
WBC # BLD AUTO: 5.7 THOUSAND/UL (ref 4.31–10.16)

## 2020-01-20 PROCEDURE — 99284 EMERGENCY DEPT VISIT MOD MDM: CPT | Performed by: INTERNAL MEDICINE

## 2020-01-20 PROCEDURE — 80053 COMPREHEN METABOLIC PANEL: CPT | Performed by: EMERGENCY MEDICINE

## 2020-01-20 PROCEDURE — 99285 EMERGENCY DEPT VISIT HI MDM: CPT

## 2020-01-20 PROCEDURE — 85610 PROTHROMBIN TIME: CPT | Performed by: EMERGENCY MEDICINE

## 2020-01-20 PROCEDURE — 85025 COMPLETE CBC W/AUTO DIFF WBC: CPT | Performed by: EMERGENCY MEDICINE

## 2020-01-20 PROCEDURE — 99285 EMERGENCY DEPT VISIT HI MDM: CPT | Performed by: EMERGENCY MEDICINE

## 2020-01-20 PROCEDURE — 36415 COLL VENOUS BLD VENIPUNCTURE: CPT | Performed by: EMERGENCY MEDICINE

## 2020-01-20 PROCEDURE — 71045 X-RAY EXAM CHEST 1 VIEW: CPT

## 2020-01-20 PROCEDURE — 85730 THROMBOPLASTIN TIME PARTIAL: CPT | Performed by: EMERGENCY MEDICINE

## 2020-01-20 PROCEDURE — 93005 ELECTROCARDIOGRAM TRACING: CPT

## 2020-01-20 PROCEDURE — 93010 ELECTROCARDIOGRAM REPORT: CPT | Performed by: INTERNAL MEDICINE

## 2020-01-20 PROCEDURE — 84484 ASSAY OF TROPONIN QUANT: CPT | Performed by: EMERGENCY MEDICINE

## 2020-01-20 NOTE — ED PROVIDER NOTES
History  Chief Complaint   Patient presents with    Rapid Heart Rate     Patient states she woke up this morning with a rapid heart beat, after taking her medicine heart rate has slowed      Patient has been well recently  She states she noted palpitations shortly after waking up this morning to use the bathroom at around 0330 hours  Patient had no chest pain or shortness of breath  She took her pulse rate and was high  Patient called the cardiologist who told her to take her morning meds which she did  Patient states that she started feeling better in route this morning to the hospital   She arrives awake alert with normal sinus rhythm at a normal rate  She has had no recent fever, has been avoiding stimulants  Prior to Admission Medications   Prescriptions Last Dose Informant Patient Reported? Taking?    ELIQUIS 5 MG  Self No No   Sig: Take 1 tablet (5 mg total) by mouth 2 (two) times a day   atorvastatin (LIPITOR) 10 mg tablet   No No   Sig: TAKE 1 TABLET BY MOUTH  DAILY   flecainide (TAMBOCOR) 50 mg tablet  Self No No   Sig: Take 1 tablet (50 mg total) by mouth 2 (two) times a day   metoprolol tartrate (LOPRESSOR) 25 mg tablet  Self No No   Sig: Take 1 tablet (25 mg total) by mouth every 12 (twelve) hours   oxymetazoline (AFRIN) 0 05 % nasal spray   No No   Si sprays by Each Nare route 2 (two) times a day For up to 3 consecutive days      Facility-Administered Medications: None       Past Medical History:   Diagnosis Date    Atrial fibrillation ([address] )     Hypertension     Mitral valve prolapse     Obesity     last assessed 2017       Past Surgical History:   Procedure Laterality Date    CATARACT EXTRACTION      B/L    HEMORRHOID SURGERY         Family History   Problem Relation Age of Onset    Hyperlipidemia Mother     Hypertension Mother     No Known Problems Father     No Known Problems Sister     No Known Problems Daughter     No Known Problems Maternal Grandmother     No Known Problems Paternal Grandmother     No Known Problems Sister     No Known Problems Daughter     Pancreatic cancer Maternal Aunt     No Known Problems Maternal Aunt     No Known Problems Maternal Aunt     No Known Problems Maternal Aunt     No Known Problems Paternal Aunt     No Known Problems Paternal Aunt     No Known Problems Paternal Aunt      I have reviewed and agree with the history as documented  Social History     Tobacco Use    Smoking status: Former Smoker     Types: Cigarettes    Smokeless tobacco: Never Used   Substance Use Topics    Alcohol use: Yes     Alcohol/week: 3 0 standard drinks     Types: 3 Glasses of wine per week    Drug use: Never        Review of Systems   Constitutional: Negative for chills and fever  HENT: Negative for congestion  Eyes: Negative for visual disturbance  Respiratory: Positive for shortness of breath  Negative for cough  Cardiovascular: Positive for palpitations  Negative for chest pain and leg swelling  Gastrointestinal: Negative for abdominal pain and vomiting  Genitourinary: Negative for dysuria  Musculoskeletal: Negative for back pain and neck pain  Skin: Negative for rash  Neurological: Positive for light-headedness  Negative for weakness  Hematological: Does not bruise/bleed easily  Psychiatric/Behavioral: Negative for confusion  All other systems reviewed and are negative  Physical Exam  Physical Exam   Constitutional: She is oriented to person, place, and time  She appears well-developed and well-nourished  HENT:   Head: Normocephalic and atraumatic  Mouth/Throat: Oropharynx is clear and moist    Eyes: Conjunctivae are normal    Neck: Normal range of motion  Neck supple  Cardiovascular: Normal rate, regular rhythm and normal heart sounds  Pulmonary/Chest: Effort normal and breath sounds normal    Abdominal: Soft  There is no tenderness  Musculoskeletal: Normal range of motion   She exhibits no edema or tenderness  Neurological: She is alert and oriented to person, place, and time  Skin: Skin is warm and dry  Capillary refill takes less than 2 seconds  Psychiatric: She has a normal mood and affect  Her behavior is normal    Nursing note and vitals reviewed        Vital Signs  ED Triage Vitals   Temperature Pulse Respirations Blood Pressure SpO2   01/20/20 0930 01/20/20 0930 01/20/20 0930 01/20/20 0930 01/20/20 0930   97 7 °F (36 5 °C) 68 18 (!) 201/103 97 %      Temp Source Heart Rate Source Patient Position - Orthostatic VS BP Location FiO2 (%)   01/20/20 0930 01/20/20 1000 -- -- --   Tympanic Monitor         Pain Score       01/20/20 0943       No Pain           Vitals:    01/20/20 0930 01/20/20 1000 01/20/20 1045 01/20/20 1109   BP: (!) 201/103 164/87 139/77 153/78   Pulse: 68 58 (!) 52 (!) 54         Visual Acuity      ED Medications  Medications - No data to display    Diagnostic Studies  Results Reviewed     Procedure Component Value Units Date/Time    Troponin I [[id number]]  (Normal) Collected:  01/20/20 0947    Lab Status:  Final result Specimen:  Blood from Arm, Left Updated:  01/20/20 1016     Troponin I <0 02 ng/mL     Comprehensive metabolic panel [[id number]] Collected:  01/20/20 0947    Lab Status:  Final result Specimen:  Blood from Arm, Left Updated:  01/20/20 1010     Sodium 141 mmol/L      Potassium 3 9 mmol/L      Chloride 106 mmol/L      CO2 28 mmol/L      ANION GAP 7 mmol/L      BUN 18 mg/dL      Creatinine 0 82 mg/dL      Glucose 113 mg/dL      Calcium 9 0 mg/dL      AST 25 U/L      ALT 42 U/L      Alkaline Phosphatase 90 U/L      Total Protein 7 1 g/dL      Albumin 3 9 g/dL      Total Bilirubin 0 40 mg/dL      eGFR 75 ml/min/1 73sq m     Narrative:       Ahmet guidelines for Chronic Kidney Disease (CKD):     Stage 1 with normal or high GFR (GFR > 90 mL/min/1 73 square meters)    Stage 2 Mild CKD (GFR = 60-89 mL/min/1 73 square meters)    Stage 3A Moderate CKD (GFR = 45-59 mL/min/1 73 square meters)    Stage 3B Moderate CKD (GFR = 30-44 mL/min/1 73 square meters)    Stage 4 Severe CKD (GFR = 15-29 mL/min/1 73 square meters)    Stage 5 End Stage CKD (GFR <15 mL/min/1 73 square meters)  Note: GFR calculation is accurate only with a steady state creatinine    Protime-INR [[id number]]  (Normal) Collected:  01/20/20 0947    Lab Status:  Final result Specimen:  Blood from Arm, Left Updated:  01/20/20 1005     Protime 10 6 seconds      INR 0 98    APTT [[id number]]  (Normal) Collected:  01/20/20 0947    Lab Status:  Final result Specimen:  Blood from Arm, Left Updated:  01/20/20 1005     PTT 29 seconds     CBC and differential [[id number]] Collected:  01/20/20 0947    Lab Status:  Final result Specimen:  Blood from Arm, Left Updated:  01/20/20 0953     WBC 5 70 Thousand/uL      RBC 5 01 Million/uL      Hemoglobin 14 6 g/dL      Hematocrit 44 0 %      MCV 88 fL      MCH 29 1 pg      MCHC 33 2 g/dL      RDW 12 9 %      MPV 9 4 fL      Platelets 097 Thousands/uL      nRBC 0 /100 WBCs      Neutrophils Relative 74 %      Immat GRANS % 0 %      Lymphocytes Relative 18 %      Monocytes Relative 7 %      Eosinophils Relative 1 %      Basophils Relative 0 %      Neutrophils Absolute 4 19 Thousands/µL      Immature Grans Absolute 0 01 Thousand/uL      Lymphocytes Absolute 1 01 Thousands/µL      Monocytes Absolute 0 39 Thousand/µL      Eosinophils Absolute 0 08 Thousand/µL      Basophils Absolute 0 02 Thousands/µL     UA (URINE) with reflex to Scope [[id number]]     Lab Status:  No result Specimen:  Urine                  XR chest 1 view portable   Final Result by Carlos Stone MD (01/20 1003)      No acute cardiopulmonary disease              Workstation performed: [id number]                    Procedures  ECG 12 Lead Documentation Only  Date/Time: 1/20/2020 9:30 AM  Performed by: Светлана Hartmann MD  Authorized by: Светлана Hartmann MD     Indications / Diagnosis:  Palpitations  ECG reviewed by me, the ED Provider: yes    Patient location:  ED  Interpretation:     Interpretation: non-specific    Rate:     ECG rate:  60    ECG rate assessment: normal    Rhythm:     Rhythm: sinus rhythm    Ectopy:     Ectopy: none    QRS:     QRS axis:  Normal    QRS intervals:  Normal  Conduction:     Conduction: abnormal      Abnormal conduction: incomplete RBBB    ST segments:     ST segments:  Normal  T waves:     T waves: normal               ED Course                               MDM  Number of Diagnoses or Management Options  PA[facility] ):   Palpitations:   Diagnosis management comments: Patient is back in sinus rhythm but I suspects she was in a paroxysmal atrial fibrillation this morning        Disposition  Final diagnoses:   Palpitations   PAF (paroxysmal atrial fibrillation) ([facility] )     Time reflects when diagnosis was documented in both MDM as applicable and the Disposition within this note     Time User Action Codes Description Comment    1/20/2020 11:05 AM Amanda GALDAMEZ Add [R00 2] Palpitations     1/20/2020 11:06 AM Mersaul Shannons A Add [I48 0] PAF (paroxysmal atrial fibrillation) [facility])       ED Disposition     ED Disposition Condition Date/Time Comment    Discharge Stable Mon Jan 20, 2020 11:05 AM Cristi Balbuena discharge to home/self care  Follow-up Information     Follow up With Specialties Details Why [id number] [facility],  Family Medicine     [address]      Martha Schmitz MD Cardiology Schedule an appointment as soon as possible for a visit in 1 day  [id number] [facility]  [phone]            Patient's Medications   Discharge Prescriptions    No medications on file     No discharge procedures on file      ED Provider  Electronically Signed by           Jose R Lock MD  01/20/20 2691

## 2020-01-20 NOTE — CONSULTS
Consultation - Cardiology   [facility]     Shen Tompkins 71 y o  female MRN: [id number]  : 1955  Unit/Bed#: ED 08 Encounter: [id number]      Assessment & Plan     1  Paroxysmal atrial fibrillation  Patient has history of paroxysmal atrial fibrillation  She has been cardioverted twice in the past   She was started on flecainide  Will increase flecainide 50 mg twice a day as she is bradycardic will increase metoprolol to 25 twice a day  Will monitor her heart rate as an outpatient    2  Palpitation  Probably patient has short duration atrial fibrillation  She is already back to sinus rhythm she was reassured  She did the right thing taking extra metoprolol  3  Essential hypertension  Patient blood pressure is acceptable  4  Dyslipidemia  Her cholesterol profile reviewed  Continue Lipitor 10 mg  She is tolerating it very well    5  History of mitral valve disorder  There is no evidence of mitral prolapse he has normal LV systolic function  She has recently extensive cardiac workup in the form of echo and stress test which was acceptable  Summary of Recommendations:        Increase flecainide 100 mg twice a day  Decrease metoprolol to 25 twice a day  Continue other medication as before  Holter monitor as an outpatient  Discussed with the ED attending  Okay to discharge from cardiac point of view and follow up with Cardiology in few weeks  Her labs and troponin reviewed there are acceptable  Thank you for your consultation  Physician Requesting Consult: Dhruv Shah MD    Reason for Consult / Principal Problem:  Palpitation    Inpatient consult to Cardiology  Consult performed by: Ernestina Tejada MD  Consult ordered by: Dhruv Shah MD          HPI: Shen Tompkins is a 71y o  year old female who presents with palpitations    Patient who has a history of paroxysmal atrial fibrillation status post cardioversion twice, family history of atrial fibrillation, dyslipidemia, essential hypertension noted this morning having palpitation  She woke up around 5:00 a m  Palpitations  She took her pulse noted her heart rate was irregular as she has history of atrial fibrillation she called our office and was sent to ED  However he was she was in sinus rhythm  She felt in between her heard went back to regular rhythm  Currently she is sinus heart rate around 60 beats per minute  She is taking flecainide 50 mg twice a day and metoprolol 50 mg twice a day  She denies any chest pain any shortness of breath any dizziness any lightheadedness on is complaint  Currently she is back to sinus rhythm her heart rate is around 55 60 beats per minute  No fever no chills no nausea no vomiting no other significant complaint  Review of Systems   Constitutional: Negative for activity change, chills, diaphoresis, fever and unexpected weight change  HENT: Negative for congestion  Eyes: Negative for discharge and redness  Respiratory: Negative for cough, chest tightness, shortness of breath and wheezing  Cardiovascular: Positive for palpitations  Negative for chest pain and leg swelling  Gastrointestinal: Negative for abdominal pain, diarrhea and nausea  Endocrine: Negative  Genitourinary: Negative for decreased urine volume and urgency  Musculoskeletal: Negative  Negative for arthralgias, back pain and gait problem  Skin: Negative for rash and wound  Allergic/Immunologic: Negative  Neurological: Negative for dizziness, seizures, syncope, weakness, light-headedness and headaches  Hematological: Negative  Psychiatric/Behavioral: Positive for sleep disturbance  Negative for agitation and confusion  The patient is nervous/anxious          Historical Information   Past Medical History:   Diagnosis Date    Atrial fibrillation ([address] )     Hypertension     Mitral valve prolapse     Obesity     last assessed 4/27/2017     Past Surgical History: Procedure Laterality Date    CATARACT EXTRACTION      B/L    HEMORRHOID SURGERY       Social History     Substance and Sexual Activity   Alcohol Use Yes    Alcohol/week: 3 0 standard drinks    Types: 3 Glasses of wine per week     Social History     Substance and Sexual Activity   Drug Use Never     Social History     Tobacco Use   Smoking Status Former Smoker    Types: Cigarettes   Smokeless Tobacco Never Used     Family History:   Family History   Problem Relation Age of Onset    Hyperlipidemia Mother     Hypertension Mother     No Known Problems Father     No Known Problems Sister     No Known Problems Daughter     No Known Problems Maternal Grandmother     No Known Problems Paternal Grandmother     No Known Problems Sister     No Known Problems Daughter     Pancreatic cancer Maternal Aunt     No Known Problems Maternal Aunt     No Known Problems Maternal Aunt     No Known Problems Maternal Aunt     No Known Problems Paternal Aunt     No Known Problems Paternal Aunt     No Known Problems Paternal Aunt        Meds/Allergies    PTA meds:    (Not in a hospital admission)   No Known Allergies  No current facility-administered medications for this encounter       Current Outpatient Medications:     atorvastatin (LIPITOR) 10 mg tablet, TAKE 1 TABLET BY MOUTH  DAILY, Disp: 90 tablet, Rfl: 0    ELIQUIS 5 MG, Take 1 tablet (5 mg total) by mouth 2 (two) times a day, Disp: 180 tablet, Rfl: 3    flecainide (TAMBOCOR) 50 mg tablet, Take 1 tablet (50 mg total) by mouth 2 (two) times a day, Disp: 60 tablet, Rfl: 3    metoprolol tartrate (LOPRESSOR) 25 mg tablet, Take 1 tablet (25 mg total) by mouth every 12 (twelve) hours, Disp: 180 tablet, Rfl: 3    oxymetazoline (AFRIN) 0 05 % nasal spray, 2 sprays by Each Nare route 2 (two) times a day For up to 3 consecutive days, Disp: 30 mL, Rfl: 5    VTE Pharmacologic Prophylaxis:   Eliquis    Objective:   Vitals: Blood pressure 153/78, pulse (!) 54, temperature 97 7 °F (36 5 °C), temperature source Tympanic, resp  rate 18, weight 81 6 kg (180 lb), SpO2 93 %  Body mass index is 29 05 kg/m²  BP Readings from Last 3 Encounters:   01/20/20 153/78   12/18/19 160/90   11/07/19 114/72     Orthostatic Blood Pressures      Most Recent Value   Blood Pressure  153/78 filed at 01/20/2020 1109        No intake or output data in the 24 hours ending 01/20/20 1110    Invasive Devices     Peripheral Intravenous Line            Peripheral IV 01/20/20 Left Antecubital less than 1 day                  Physical Exam:   Physical Exam    Neurologic:  Alert & oriented x 3, no new focal deficits, Not in any acute distress,  Constitutional:  Well developed, well nourished, non-toxic appearance   Eyes:  Pupil equal and reacting to light, conjunctiva normal   HENT:  Atraumatic, oropharynx moist, Neck- normal range of motion, no tenderness, supple   Respiratory:  Bilateral air entry, mostly clear to auscultation  Cardiovascular: S1-S2 regular with a 2/6 ejection systolic murmur , no gallop some murmur  GI:  Soft, nondistended, normal bowel sounds, nontender, no hepatosplenomegaly appreciated  Musculoskeletal:  No edema, no tenderness, no deformities     Skin:  Well hydrated, no rash   Lymphatic:  No lymphadenopathy noted   Extremities:  No edema distal pulses are present    Labs:   Troponins: Results from last 7 days   Lab Units 01/20/20  0947   TROPONIN I ng/mL <0 02       CBC with diff: Results from last 7 days   Lab Units 01/20/20  0947   WBC Thousand/uL 5 70   HEMOGLOBIN g/dL 14 6   HEMATOCRIT % 44 0   MCV fL 88   PLATELETS Thousands/uL 213   MCH pg 29 1   MCHC g/dL 33 2   RDW % 12 9   MPV fL 9 4   NRBC AUTO /100 WBCs 0       CMP: Results from last 7 days   Lab Units 01/20/20  0947   SODIUM mmol/L 141   POTASSIUM mmol/L 3 9   CHLORIDE mmol/L 106   CO2 mmol/L 28   ANION GAP mmol/L 7   BUN mg/dL 18   CREATININE mg/dL 0 82   CALCIUM mg/dL 9 0   AST U/L 25   ALT U/L 42   ALK PHOS U/L 90 TOTAL PROTEIN g/dL 7 1   ALBUMIN g/dL 3 9   TOTAL BILIRUBIN mg/dL 0 40   EGFR ml/min/1 73sq m 75   GLUCOSE RANDOM mg/dL 113       Magnesium:     Coags:   Results from last 7 days   Lab Units 20  0947   PTT seconds 29   INR  0 98     Lipid Profile:   Lab Results   Component Value Date    CHOLESTEROL 221 (H) 2018    HDL 66 2018    LDLCALC 132 (H) 2017    TRIG 104 2018         Imaging & Testing   Cardiac testing:   Results for orders placed during the hospital encounter of 19   Echo complete with contrast if indicated    Narrative [address]  [address] [facility]  [phone]    Transthoracic Echocardiogram  2D, M-mode, Doppler, and Color Doppler    Study date:  2019    Patient: Pam Blackwell  MR number: [id number]  Account number: [de-identified]  : 1955  Age: 71 years  Gender: Female  Status: Outpatient  Location: Echo lab  Height: 66 in  Weight: 172 7 lb  BP: 173/ 99 mmHg    Indications: SOB    Diagnoses: I48 0 - Atrial fibrillation    Sonographer:  TEVIN Restrepo  Primary Physician:  Elfego Burton DO  Referring Physician:  Eladio Briggs MD  Group:  [facility]  Interpreting Physician:  Sue Doss MD    SUMMARY    LEFT VENTRICLE:  Systolic function was normal by Teichholz  Ejection fraction was estimated in the range of 55 % to 60 % to be 58 %  There were no regional wall motion abnormalities  Wall thickness was mildly increased  LEFT ATRIUM:  The atrium was mildly dilated  MITRAL VALVE:  No echocardiographic evidence for prolapse  There was trace regurgitation  HISTORY: PRIOR HISTORY: Atrial Fibrillation,SOB,Mitral valve prolapse,HTN,Hyperlipidemia  PROCEDURE: The procedure was performed in the echo lab  This was a routine study  The transthoracic approach was used  The study included complete 2D imaging, M-mode, complete spectral Doppler, and color Doppler   The heart rate was 54 bpm,  at the start of the study  Images were obtained from the parasternal, apical, subcostal, and suprasternal notch acoustic windows  Image quality was adequate  LEFT VENTRICLE: Size was normal  Systolic function was normal by Teichholz  Ejection fraction was estimated in the range of 55 % to 60 % to be 58 %  There were no regional wall motion abnormalities  Wall thickness was mildly increased  No  evidence of apical thrombus  DOPPLER: The study was not technically sufficient to allow evaluation of LV diastolic function  RIGHT VENTRICLE: The size was normal  Systolic function was normal  Wall thickness was normal     LEFT ATRIUM: The atrium was mildly dilated  RIGHT ATRIUM: Size was normal     MITRAL VALVE: There was mild thickening  There was normal leaflet separation  No echocardiographic evidence for prolapse  DOPPLER: The transmitral velocity was within the normal range  There was no evidence for stenosis  There was trace  regurgitation  AORTIC VALVE: The valve was trileaflet  Leaflets exhibited normal thickness and normal cuspal separation  DOPPLER: Transaortic velocity was within the normal range  There was no evidence for stenosis  There was no significant  regurgitation  TRICUSPID VALVE: The valve structure was normal  There was normal leaflet separation  DOPPLER: The transtricuspid velocity was within the normal range  There was no evidence for stenosis  There was no significant regurgitation  PULMONIC VALVE: Leaflets exhibited normal thickness, no calcification, and normal cuspal separation  DOPPLER: The transpulmonic velocity was within the normal range  There was no significant regurgitation  PERICARDIUM: There was no pericardial effusion  The pericardium was normal in appearance  AORTA: The root exhibited normal size  SYSTEMIC VEINS: IVC: The inferior vena cava was normal in size      SYSTEM MEASUREMENT TABLES    2D mode  AoR Diam 2D: 2 9 cm  LA Diam (2D): 3 8 cm  LA/Ao (2D): 1 31  FS (2D Teich): 31 %  IVSd (2D): 1 2 cm  LVDEV: 132 cmï¾³  LVEDV MOD BP: 137 cmï¾³  LVESV: 55 2 cmï¾³  LVIDd(2D): 5 25 cm  LVISd (2D): 3 62 cm  LVOT Area 2D: 3 14 cmï¾²  LVPWd (2D): 1 08 cm  SV (Teich): 76 8 cmï¾³    Apical four chamber  LVEF A4C: 61 %    Apical two chamber  LA Area: 20 8 cmï¾²  LA Volume: 57 cmï¾³  LVEF A2C: 53 %    Unspecified Scan Mode  ANA Cont Eq (Peak Jabari): 2 57 cmï¾²  LVOT Diam : 2 cm  LVOT Vmax: 1320 mm/s  LVOT Vmax; Mean: 1320 mm/s  Peak Grad ; Mean: 7 mm[Hg]  MV Peak A Jabari: 775 mm/s  MV Peak E Jabari  Mean: 731 mm/s  MVA (PHT): 2 75 cmï¾²  PHT: 80 ms  RA Area: 17 4 cmï¾²  RA Volume: 48 5 cmï¾³  TAPSE: 2 1 cm    [facility]    Prepared and electronically signed by    Sanam Mcmillan MD  Signed 2019 11:47:01       No results found for this or any previous visit  No results found for this or any previous visit  Results for orders placed during the hospital encounter of 19   NM myocardial perfusion spect (stress and/or rest)    [facility] [address]  [address] [facility] [address] [phone]    Rest/Stress Gated SPECT Myocardial Perfusion Imaging After Exercise    Patient: Kerri Rodríguez  MR number: [id number]  Account number: [de-identified]  : 1955  Age: 71 years  Gender: Female  Status: Outpatient  Location: Stress lab  Height: 66 in  Weight: 173 lb  BP: 130/ 78 mmHg    Allergies: NO KNOWN ALLERGIES    Diagnosis: I48 0 - Atrial fibrillation, R06 02 - Shortness of breath    Primary Physician:  Levi Garcia DO  Technician:  Madelaine Schmitt  RN:  PAULINO Alarcon  Referring Physician:  Louis George MD  Group:  [facility]  Report Prepared By[de-identified]  Artemio Patterson, BSN  Interpreting Physician:  Anderson Loving DO    INDICATIONS: Evaluation for coronary artery disease  HISTORY: The patient is a 71year old   Chest pain status: no chest pain   Other symptoms: dyspnea and palpitations  Coronary artery disease risk factors: dyslipidemia and hypertension  Cardiovascular history: mitral valve disease  and arrhythmia  Medications: an anticoagulant, a beta blocker and a lipid lowering agent  PHYSICAL EXAM: Baseline physical exam screening: normal     REST ECG: Normal sinus rhythm  Normal baseline ECG  PROCEDURE: The study was performed in the the Stress lab  Treadmill exercise testing was performed, using the Jasmyne protocol  Gated SPECT myocardial perfusion imaging was performed after stress and at rest  Systolic blood pressure was 130  mmHg, at the start of the study  Diastolic blood pressure was 78 mmHg, at the start of the study  The heart rate was 60 bpm, at the start of the study  Patient was not experiencing chest pain at the time of the injection of the  radiopharmaceutical  IV double checked  JASMYNE PROTOCOL:  HR bpm SBP mmHg DBP mmHg Symptoms ST change Rhythm/conduct  Baseline 60 130 78 none none NSR, no ectopy, NSR  Stage 1 87 132 78 none -- same as above  Stage 2 106 142 78 mild dyspnea, moderate fatigue -- rare PAC's  Stage 3 120 160 80 moderate dyspnea, severe fatigue -- --  Immediate 125 174 80 same as above -- --  Recovery 1 82 160 80 subsiding -- --  Recovery 2 75 136 74 none -- --  No medications or fluids given  STRESS SUMMARY: Duration of exercise was 10 min and 37 sec  The patient exercised to protocol stage 4  Maximal work rate was 13 4 METs  Functional capacity was normal  Maximal heart rate during stress was 137 bpm ( 88 % of maximal  predicted heart rate)  The heart rate response to stress was normal  There was normal resting blood pressure with an appropriate response to stress  The rate-pressure product for the peak heart rate and blood pressure was 54592  There was  no chest pain during stress  The stress test was terminated due to achievement of target heart rate, moderate dyspnea, and severe fatigue   Pre oxygen saturation: 99 %  Peak oxygen saturation: 98 %  The stress ECG was normal  There were no  stress arrhythmias or conduction abnormalities  ISOTOPE ADMINISTRATION:  Resting isotope administration Stress isotope administration  Agent Sestamibi Sestamibi  Dose 10 mCi 31 2 mCi  Date 06/12/2019 06/12/2019  Injection time 08:00 09:35    The radiopharmaceutical was injected at the peak effect of pharmacologic stress  Radiopharmaceutical was administered 10 min, 19 sec into the stress protocol  There was 1 min of exercise after the injection  MYOCARDIAL PERFUSION IMAGING:  The image quality was good  Rotating projection images reveal breast attenuation  Left ventricular size was normal     PERFUSION DEFECTS:  -  There was a small, mildly severe, paradoxical (reverse redistribution) myocardial perfusion defect of the anterior wall likely due to attenuation from breast tissue  GATED SPECT:  The calculated left ventricular ejection fraction was 68 %  Left ventricular ejection fraction was within normal limits by visual estimate  There was no left ventricular regional abnormality  SUMMARY:  -  Stress results: Duration of exercise was 10 min and 37 sec  Target heart rate was achieved  There was no chest pain during stress  -  ECG conclusions: The stress ECG was normal   -  Perfusion imaging: There was a small, mildly severe, paradoxical (reverse redistribution) myocardial perfusion defect of the anterior wall likely due to attenuation from breast tissue   -  Gated SPECT: The calculated left ventricular ejection fraction was 68 %  Left ventricular ejection fraction was within normal limits by visual estimate  There was no left ventricular regional abnormality  IMPRESSIONS: Normal study after maximal exercise  Myocardial perfusion imaging was normal at rest and with stress   Left ventricular systolic function was normal     Prepared and signed by    Liane Grace DO  Signed 06/13/2019 19:13:54         Imaging: I have personally reviewed pertinent reports  Xr Chest 1 View Portable    Result Date: 1/20/2020  Narrative: CHEST INDICATION:   Palpitations  COMPARISON:  Chest radiograph from 4/24/2019  EXAM PERFORMED/VIEWS:  XR CHEST PORTABLE FINDINGS: Cardiomediastinal silhouette appears unremarkable  The lungs are clear  No pneumothorax or pleural effusion  Osseous structures appear within normal limits for patient age  Impression: No acute cardiopulmonary disease  Workstation performed: [id number]     EKG/ Monitor: Personally reviewed  Current EKG shows sinus rhythm heart rate 58 beats per minute normal intervals  Code Status: No Order  Advance Directive and Living Will:      POLST:        Talon Chirinos MD MD, [facility] [place]      "This note has been constructed using a voice recognition system  Therefore there may be syntax, spelling, and/or grammatical errors   Please call if you have any questions  "

## 2020-01-20 NOTE — TELEPHONE ENCOUNTER
Patient called today with c/o being in afib  I spoke verbally with Dr Leesa Victoria who recommends that she go to the hospital, and he will cardiovert her there  This was relayed to the patient  No further questions

## 2020-01-22 ENCOUNTER — VBI (OUTPATIENT)
Dept: FAMILY MEDICINE CLINIC | Facility: CLINIC | Age: 65
End: 2020-01-22

## 2020-01-22 NOTE — TELEPHONE ENCOUNTER
Vinnie Munguia    ED Visit Information     Ed visit date: 01/20/2020  Diagnosis Description: Palpitations; PAF (paroxysmal atrial fibrillation) ([facility] [address] )  In Network? Yes Audie [place]  Discharge status: Home  Discharged with meds ? No  Number of ED visits to date: 1  ED Severity:NA     Outreach Information    Outreach successful: Yes 1  Date letter mailed:NA  Date Finalized:01/22/2020    Care Coordination    Follow up appointment with pcp: no no  Transportation issues ? NA    Value Base Outreach    01/22/2020 1:47 PM - [facility]  Patient returned my call and states she is ok  Her symptoms resolved before she arrived at the ED  Dr Christiano Hunt has since adjusted her medications

## 2020-01-27 DIAGNOSIS — I48.0 PAROXYSMAL ATRIAL FIBRILLATION (HCC): ICD-10-CM

## 2020-01-27 RX ORDER — FLECAINIDE ACETATE 100 MG/1
100 TABLET ORAL 2 TIMES DAILY
Qty: 180 TABLET | Refills: 3 | Status: SHIPPED | OUTPATIENT
Start: 2020-01-27 | End: 2020-02-10 | Stop reason: SDUPTHER

## 2020-01-27 RX ORDER — FLECAINIDE ACETATE 50 MG/1
50 TABLET ORAL 2 TIMES DAILY
Qty: 60 TABLET | Refills: 0 | OUTPATIENT
Start: 2020-01-27

## 2020-02-08 DIAGNOSIS — I48.19 PERSISTENT ATRIAL FIBRILLATION (HCC): ICD-10-CM

## 2020-02-10 DIAGNOSIS — I48.0 PAROXYSMAL ATRIAL FIBRILLATION (HCC): ICD-10-CM

## 2020-02-10 RX ORDER — FLECAINIDE ACETATE 100 MG/1
100 TABLET ORAL 2 TIMES DAILY
Qty: 180 TABLET | Refills: 3 | Status: SHIPPED | OUTPATIENT
Start: 2020-02-10 | End: 2021-01-28 | Stop reason: SDUPTHER

## 2020-02-10 RX ORDER — APIXABAN 5 MG/1
TABLET, FILM COATED ORAL
Qty: 60 TABLET | Refills: 6 | Status: SHIPPED | OUTPATIENT
Start: 2020-02-10 | End: 2020-03-26 | Stop reason: SDUPTHER

## 2020-02-28 ENCOUNTER — TELEPHONE (OUTPATIENT)
Dept: CARDIOLOGY CLINIC | Facility: CLINIC | Age: 65
End: 2020-02-28

## 2020-02-28 NOTE — LETTER
October 8, 2020     [address] [facility] [address]    Patient: Shen Tompkins   YOB: 1955   Date of Visit: 2/28/2020        Dear Dr Summer Hassan    Procedure:  Dental Cleaning appointment on March 6, 2020    Scheduled: March 6, 2020    Antiocoagulants: Eliquis 5 mg    Patient may HOLD Eliquis  (   )  days prior to dental cleaning         Sincerely,        Sheri Leal MA        CC: No Recipients

## 2020-02-28 NOTE — TELEPHONE ENCOUNTER
Nothing special need to be done  She can hold her Eliquis 2 days before the procedure  If simple cleaning his expected, and his dental doctor expect not much bleeding even with few no need to hold it

## 2020-03-03 DIAGNOSIS — E78.5 HYPERLIPIDEMIA LDL GOAL <130: ICD-10-CM

## 2020-03-04 RX ORDER — ATORVASTATIN CALCIUM 10 MG/1
TABLET, FILM COATED ORAL
Qty: 90 TABLET | Refills: 0 | Status: SHIPPED | OUTPATIENT
Start: 2020-03-04 | End: 2020-03-10 | Stop reason: SDUPTHER

## 2020-03-04 NOTE — TELEPHONE ENCOUNTER
Requested medication(s) are due for refill today: Yes  Patient has already received a courtesy refill: No  Other reason request has been forwarded to provider: Failed protocol

## 2020-03-08 PROBLEM — I48.91 ATRIAL FIBRILLATION (HCC): Status: RESOLVED | Noted: 2019-04-23 | Resolved: 2020-03-08

## 2020-03-10 ENCOUNTER — OFFICE VISIT (OUTPATIENT)
Dept: FAMILY MEDICINE CLINIC | Facility: CLINIC | Age: 65
End: 2020-03-10
Payer: COMMERCIAL

## 2020-03-10 VITALS
HEIGHT: 66 IN | RESPIRATION RATE: 16 BRPM | DIASTOLIC BLOOD PRESSURE: 84 MMHG | TEMPERATURE: 98.7 F | BODY MASS INDEX: 29.54 KG/M2 | HEART RATE: 64 BPM | SYSTOLIC BLOOD PRESSURE: 126 MMHG | WEIGHT: 183.8 LBS | OXYGEN SATURATION: 98 %

## 2020-03-10 DIAGNOSIS — I10 BENIGN ESSENTIAL HYPERTENSION: Primary | ICD-10-CM

## 2020-03-10 DIAGNOSIS — Z13.89 SCREENING FOR CARDIOVASCULAR, RESPIRATORY, AND GENITOURINARY DISEASES: ICD-10-CM

## 2020-03-10 DIAGNOSIS — Z13.83 SCREENING FOR CARDIOVASCULAR, RESPIRATORY, AND GENITOURINARY DISEASES: ICD-10-CM

## 2020-03-10 DIAGNOSIS — R73.01 IFG (IMPAIRED FASTING GLUCOSE): ICD-10-CM

## 2020-03-10 DIAGNOSIS — Z13.1 SCREENING FOR DIABETES MELLITUS (DM): ICD-10-CM

## 2020-03-10 DIAGNOSIS — Z13.6 SCREENING FOR CARDIOVASCULAR, RESPIRATORY, AND GENITOURINARY DISEASES: ICD-10-CM

## 2020-03-10 DIAGNOSIS — I48.91 ATRIAL FIBRILLATION WITH CONTROLLED VENTRICULAR RATE (HCC): ICD-10-CM

## 2020-03-10 DIAGNOSIS — E78.5 HYPERLIPIDEMIA LDL GOAL <130: ICD-10-CM

## 2020-03-10 DIAGNOSIS — Z23 IMMUNIZATION DUE: ICD-10-CM

## 2020-03-10 PROCEDURE — 3288F FALL RISK ASSESSMENT DOCD: CPT | Performed by: FAMILY MEDICINE

## 2020-03-10 PROCEDURE — 90732 PPSV23 VACC 2 YRS+ SUBQ/IM: CPT

## 2020-03-10 PROCEDURE — 1036F TOBACCO NON-USER: CPT | Performed by: FAMILY MEDICINE

## 2020-03-10 PROCEDURE — 90471 IMMUNIZATION ADMIN: CPT

## 2020-03-10 PROCEDURE — 1160F RVW MEDS BY RX/DR IN RCRD: CPT | Performed by: FAMILY MEDICINE

## 2020-03-10 PROCEDURE — 3008F BODY MASS INDEX DOCD: CPT | Performed by: FAMILY MEDICINE

## 2020-03-10 PROCEDURE — 1101F PT FALLS ASSESS-DOCD LE1/YR: CPT | Performed by: FAMILY MEDICINE

## 2020-03-10 PROCEDURE — 4040F PNEUMOC VAC/ADMIN/RCVD: CPT | Performed by: FAMILY MEDICINE

## 2020-03-10 PROCEDURE — 3079F DIAST BP 80-89 MM HG: CPT | Performed by: FAMILY MEDICINE

## 2020-03-10 PROCEDURE — 99214 OFFICE O/P EST MOD 30 MIN: CPT | Performed by: FAMILY MEDICINE

## 2020-03-10 PROCEDURE — 3074F SYST BP LT 130 MM HG: CPT | Performed by: FAMILY MEDICINE

## 2020-03-10 RX ORDER — ATORVASTATIN CALCIUM 10 MG/1
10 TABLET, FILM COATED ORAL DAILY
Qty: 90 TABLET | Refills: 3 | Status: SHIPPED | OUTPATIENT
Start: 2020-03-10 | End: 2021-03-04

## 2020-03-10 NOTE — PROGRESS NOTES
Assessment/Plan:    No problem-specific Assessment & Plan notes found for this encounter  Had colonoscopy by Dr Mila Cochran May 2019 in [place] ([facility])  AF with CVR, cont meds, rate control  IFG aware, diet and TLC advised  Pneumovax given, possible prevnar 13 in one year  Statin tolerated, cont same  Use of statins for the purposes of CVD/CVA risks reduction was advised according to USPSTF guidelines along with appropriate continued liver monitoring   q6m f/u     Diagnoses and all orders for this visit:    Benign essential hypertension    IFG (impaired fasting glucose)  -     Comprehensive metabolic panel; Future  -     Hemoglobin A1C; Future    Atrial fibrillation with controlled ventricular rate (HCC)  -     TSH, 3rd generation; Future  -     T4, free; Future    Screening for cardiovascular, respiratory, and genitourinary diseases  -     Lipid Panel with Direct LDL reflex; Future    Screening for diabetes mellitus (DM)    Immunization due  -     PNEUMOCOCCAL POLYSACCHARIDE VACCINE 23-VALENT =>1YO SQ IM    Hyperlipidemia LDL goal <130  -     atorvastatin (LIPITOR) 10 mg tablet; Take 1 tablet (10 mg total) by mouth daily        Return in about 6 months (around 9/10/2020) for Recheck  Subjective:      Patient ID: Ryley Mcgraw is a 71 y o  female  Chief Complaint   Patient presents with    Follow-up     follow up for hypertension/ vfrma       HPI  Cardio given noac, toprol, flecainide  ifg aware but was in ER and nonfasting? No myaglias on statin  Can do better with diet and wt loss    The following portions of the patient's history were reviewed and updated as appropriate: allergies, current medications, past family history, past medical history, past social history, past surgical history and problem list     Review of Systems   Respiratory: Negative for shortness of breath  Cardiovascular: Negative for chest pain           Current Outpatient Medications   Medication Sig Dispense Refill    atorvastatin (LIPITOR) 10 mg tablet Take 1 tablet (10 mg total) by mouth daily 90 tablet 3    ELIQUIS 5 MG TAKE 1 TABLET BY MOUTH TWO  TIMES DAILY 60 tablet 6    flecainide (TAMBOCOR) 100 mg tablet Take 1 tablet (100 mg total) by mouth 2 (two) times a day 180 tablet 3    metoprolol tartrate (LOPRESSOR) 25 mg tablet Take 1 tablet (25 mg total) by mouth every 12 (twelve) hours 180 tablet 3    oxymetazoline (AFRIN) 0 05 % nasal spray 2 sprays by Each Nare route 2 (two) times a day For up to 3 consecutive days 30 mL 5     No current facility-administered medications for this visit  Objective:    /84   Pulse 64   Temp 98 7 °F (37 1 °C)   Resp 16   Ht 5' 6" (1 676 m)   Wt 83 4 kg (183 lb 12 8 oz)   SpO2 98%   BMI 29 67 kg/m²        Physical Exam   Constitutional: She appears well-developed  No distress  HENT:   Head: Normocephalic  Nose: Nose normal    Mouth/Throat: Oropharynx is clear and moist  No oropharyngeal exudate  Eyes: Conjunctivae are normal  No scleral icterus  Neck: Neck supple  Cardiovascular: Normal rate, regular rhythm and intact distal pulses  Pulmonary/Chest: Effort normal  No respiratory distress  She has no wheezes  Abdominal: Soft  Bowel sounds are normal  There is no tenderness  Musculoskeletal: She exhibits no edema or deformity  Neurological: She is alert  She exhibits normal muscle tone  Skin: Skin is warm and dry  No pallor  Psychiatric: Her behavior is normal  Thought content normal    Nursing note and vitals reviewed  BMI Counseling: Body mass index is 29 67 kg/m²  The BMI is above normal  Nutrition recommendations include decreasing portion sizes and moderation in carbohydrate intake  Exercise recommendations include exercising 3-5 times per week  No pharmacotherapy was ordered                  Sonya Carr DO

## 2020-03-20 DIAGNOSIS — I48.19 PERSISTENT ATRIAL FIBRILLATION (HCC): ICD-10-CM

## 2020-03-26 DIAGNOSIS — I48.19 PERSISTENT ATRIAL FIBRILLATION (HCC): ICD-10-CM

## 2020-03-27 RX ORDER — APIXABAN 5 MG/1
5 TABLET, FILM COATED ORAL 2 TIMES DAILY
Qty: 180 TABLET | Refills: 3 | Status: SHIPPED | OUTPATIENT
Start: 2020-03-27 | End: 2021-03-22 | Stop reason: SDUPTHER

## 2020-09-03 LAB
ALBUMIN SERPL-MCNC: 4.5 G/DL (ref 3.8–4.8)
ALBUMIN/GLOB SERPL: 2.1 {RATIO} (ref 1.2–2.2)
ALP SERPL-CCNC: 91 IU/L (ref 39–117)
ALT SERPL-CCNC: 25 IU/L (ref 0–32)
AST SERPL-CCNC: 21 IU/L (ref 0–40)
BILIRUB SERPL-MCNC: 0.5 MG/DL (ref 0–1.2)
BUN SERPL-MCNC: 11 MG/DL (ref 8–27)
BUN/CREAT SERPL: 12 (ref 12–28)
CALCIUM SERPL-MCNC: 9.3 MG/DL (ref 8.7–10.3)
CHLORIDE SERPL-SCNC: 102 MMOL/L (ref 96–106)
CHOLEST SERPL-MCNC: 186 MG/DL (ref 100–199)
CO2 SERPL-SCNC: 24 MMOL/L (ref 20–29)
CREAT SERPL-MCNC: 0.89 MG/DL (ref 0.57–1)
GLOBULIN SER-MCNC: 2.1 G/DL (ref 1.5–4.5)
GLUCOSE SERPL-MCNC: 101 MG/DL (ref 65–99)
HBA1C MFR BLD: 5.4 % (ref 4.8–5.6)
HDLC SERPL-MCNC: 52 MG/DL
LDLC SERPL CALC-MCNC: 110 MG/DL (ref 0–99)
MICRODELETION SYND BLD/T FISH: NORMAL
POTASSIUM SERPL-SCNC: 4.4 MMOL/L (ref 3.5–5.2)
PROT SERPL-MCNC: 6.6 G/DL (ref 6–8.5)
SL AMB EGFR AFRICAN AMERICAN: 78 ML/MIN/1.73
SL AMB EGFR NON AFRICAN AMERICAN: 68 ML/MIN/1.73
SODIUM SERPL-SCNC: 142 MMOL/L (ref 134–144)
T4 FREE SERPL-MCNC: 1.21 NG/DL (ref 0.82–1.77)
TRIGL SERPL-MCNC: 133 MG/DL (ref 0–149)
TSH SERPL DL<=0.005 MIU/L-ACNC: 3.29 UIU/ML (ref 0.45–4.5)

## 2020-09-07 PROBLEM — R06.02 EXERTIONAL SHORTNESS OF BREATH: Status: RESOLVED | Noted: 2019-04-23 | Resolved: 2020-09-07

## 2020-09-07 PROBLEM — R05.9 COUGH: Status: RESOLVED | Noted: 2019-01-11 | Resolved: 2020-09-07

## 2020-09-10 ENCOUNTER — OFFICE VISIT (OUTPATIENT)
Dept: FAMILY MEDICINE CLINIC | Facility: CLINIC | Age: 65
End: 2020-09-10
Payer: COMMERCIAL

## 2020-09-10 VITALS
DIASTOLIC BLOOD PRESSURE: 80 MMHG | HEART RATE: 60 BPM | BODY MASS INDEX: 30.22 KG/M2 | SYSTOLIC BLOOD PRESSURE: 136 MMHG | HEIGHT: 66 IN | TEMPERATURE: 96.2 F | RESPIRATION RATE: 16 BRPM | WEIGHT: 188 LBS

## 2020-09-10 DIAGNOSIS — R73.03 PREDIABETES: ICD-10-CM

## 2020-09-10 DIAGNOSIS — I48.91 ATRIAL FIBRILLATION WITH CONTROLLED VENTRICULAR RATE (HCC): ICD-10-CM

## 2020-09-10 DIAGNOSIS — I10 BENIGN ESSENTIAL HYPERTENSION: Primary | ICD-10-CM

## 2020-09-10 DIAGNOSIS — E78.5 HYPERLIPIDEMIA LDL GOAL <130: ICD-10-CM

## 2020-09-10 DIAGNOSIS — Z23 NEED FOR VACCINATION: ICD-10-CM

## 2020-09-10 PROCEDURE — 90471 IMMUNIZATION ADMIN: CPT

## 2020-09-10 PROCEDURE — 1160F RVW MEDS BY RX/DR IN RCRD: CPT | Performed by: FAMILY MEDICINE

## 2020-09-10 PROCEDURE — 3075F SYST BP GE 130 - 139MM HG: CPT | Performed by: FAMILY MEDICINE

## 2020-09-10 PROCEDURE — 1036F TOBACCO NON-USER: CPT | Performed by: FAMILY MEDICINE

## 2020-09-10 PROCEDURE — 99214 OFFICE O/P EST MOD 30 MIN: CPT | Performed by: FAMILY MEDICINE

## 2020-09-10 PROCEDURE — 3725F SCREEN DEPRESSION PERFORMED: CPT | Performed by: FAMILY MEDICINE

## 2020-09-10 PROCEDURE — 90662 IIV NO PRSV INCREASED AG IM: CPT

## 2020-09-10 PROCEDURE — 3079F DIAST BP 80-89 MM HG: CPT | Performed by: FAMILY MEDICINE

## 2020-09-10 RX ORDER — AMOXICILLIN 500 MG/1
CAPSULE ORAL
COMMUNITY
Start: 2020-08-12

## 2020-09-10 NOTE — PROGRESS NOTES
Assessment/Plan:    No problem-specific Assessment & Plan notes found for this encounter  Thyroid tsh and free t4 normal, not on amiodarone    Prediabetes with fbs 101, cont TLC efforts as reviewed, more frequent exercise, carb monitoring    Lipids ok  Cont statin for risk reduction  Risks/benefits aware    Suppressed AF stable     Diagnoses and all orders for this visit:    Benign essential hypertension  -     Comprehensive metabolic panel; Future    Need for vaccination  -     FLUZONE HIGH-DOSE: influenza vaccine, high-dose, preservative-free 0 7 mL    Atrial fibrillation with controlled ventricular rate (HCC)    Hyperlipidemia LDL goal <130    Prediabetes  -     Comprehensive metabolic panel; Future  -     Hemoglobin A1C; Future    Other orders  -     amoxicillin (AMOXIL) 500 mg capsule; Before dental appointments        Return in about 6 months (around 3/10/2021)  Subjective:      Patient ID: Chantel Marin is a 76 y o  female  Chief Complaint   Patient presents with    Follow-up     medication ac/cma     Results    Blood Pressure Check       HPI    Snacking badly more often now  Still active  Taking all meds  Wt up some but feels in waist line, larger per pt    Gi reddy, colonoscopy 2019    afib suppressed  On tambocor  On eliquis  No bleeding noted  Checks own pulse for monitoring of changes    On statin  No myalgias  Use of statins for the purposes of CVD/CVA risks reduction was advised according to USPSTF guidelines along with appropriate continued liver monitoring  The following portions of the patient's history were reviewed and updated as appropriate: allergies, current medications, past family history, past medical history, past social history, past surgical history and problem list     Review of Systems   Respiratory: Negative for shortness of breath  Cardiovascular: Negative for chest pain  Musculoskeletal: Negative for myalgias           Current Outpatient Medications Medication Sig Dispense Refill    amoxicillin (AMOXIL) 500 mg capsule Before dental appointments      atorvastatin (LIPITOR) 10 mg tablet Take 1 tablet (10 mg total) by mouth daily 90 tablet 3    ELIQUIS 5 MG Take 1 tablet (5 mg total) by mouth 2 (two) times a day 180 tablet 3    flecainide (TAMBOCOR) 100 mg tablet Take 1 tablet (100 mg total) by mouth 2 (two) times a day 180 tablet 3    metoprolol tartrate (LOPRESSOR) 25 mg tablet TAKE 1 TABLET BY MOUTH  EVERY 12 HOURS 180 tablet 3     No current facility-administered medications for this visit  Objective:    /80   Pulse 60   Temp (!) 96 2 °F (35 7 °C)   Resp 16   Ht 5' 6" (1 676 m)   Wt 85 3 kg (188 lb)   BMI 30 34 kg/m²        Physical Exam  Vitals signs and nursing note reviewed  Constitutional:       Appearance: Normal appearance  She is well-developed  She is not ill-appearing or diaphoretic  HENT:      Head: Normocephalic  Right Ear: Tympanic membrane normal       Left Ear: Tympanic membrane normal       Nose: No rhinorrhea  Mouth/Throat:      Pharynx: No posterior oropharyngeal erythema  Eyes:      General: No scleral icterus  Conjunctiva/sclera: Conjunctivae normal    Neck:      Musculoskeletal: Neck supple  Vascular: No carotid bruit  Cardiovascular:      Rate and Rhythm: Normal rate and regular rhythm  Heart sounds: No murmur  Pulmonary:      Effort: Pulmonary effort is normal  No respiratory distress  Breath sounds: No wheezing  Abdominal:      General: Bowel sounds are normal  There is no distension  Palpations: Abdomen is soft  Musculoskeletal:         General: No deformity  Skin:     General: Skin is warm and dry  Coloration: Skin is not pale  Neurological:      Mental Status: She is alert  Motor: No weakness  Gait: Gait normal    Psychiatric:         Behavior: Behavior normal          Thought Content: Thought content normal           BMI Counseling:  Body mass index is 30 34 kg/m²  The BMI is above normal  Nutrition recommendations include decreasing portion sizes and moderation in carbohydrate intake  Exercise recommendations include exercising 3-5 times per week  No pharmacotherapy was ordered                Andree Goodson DO

## 2020-09-11 ENCOUNTER — TELEPHONE (OUTPATIENT)
Dept: ADMINISTRATIVE | Facility: OTHER | Age: 65
End: 2020-09-11

## 2020-09-11 NOTE — LETTER
Procedure Request Form: Colonoscopy      Date Requested: 20  Patient: Mathew Mink  Patient : [birth year]   Referring Provider: Denny Trivedi, DO        Date of Procedure ______________________________       The above patient has informed us that they have completed their   most recent Colonoscopy at your facility  Please complete   this form and attach all corresponding procedure reports/results  Comments __________________________________________________________  ____________________________________________________________________  ____________________________________________________________________  ____________________________________________________________________    Facility Completing Procedure _________________________________________    Form Completed By (print name) _______________________________________      Signature __________________________________________________________      These reports are needed for  compliance    Please fax this completed form and a copy of the procedure report to our office located at [place] [address] as soon as possible to [phone] santiago Smallwood: Phone [phone]    We thank you for your assistance in treating our mutual patient

## 2020-09-11 NOTE — TELEPHONE ENCOUNTER
----- Message from Philomena Bello DO sent at 9/10/2020  7:09 PM EDT -----  09/10/20 7:09 PM    Hello, our patient Nery Deras has had CRC: Colonoscopy completed/performed  Please assist in updating the patient chart by making an External outreach to DR Hum[facility] facility located in [place], [place]  The date of service is 2019      Thank you,  Philomena Bello DO  [place] [address]

## 2020-09-11 NOTE — TELEPHONE ENCOUNTER
Upon review of the In Basket request and the patient's chart, initial outreach has been made via fax, please see Contacts section for details  A second outreach attempt will be made within 5 business days      Thank you  Shen Mejia MA

## 2020-09-16 PROBLEM — Z86.0100 PERSONAL HISTORY OF COLONIC POLYPS: Status: ACTIVE | Noted: 2020-09-16

## 2020-09-16 PROBLEM — Z86.010 PERSONAL HISTORY OF COLONIC POLYPS: Status: ACTIVE | Noted: 2020-09-16

## 2020-09-16 NOTE — TELEPHONE ENCOUNTER
Upon review of the In Basket request we were able to locate, review, and update the patient chart as requested for CRC: Colonoscopy  Any additional questions or concerns should be emailed to the Practice Liaisons via [email or web address] email, please do not reply via In Basket      Thank you  Ramos Soares MA

## 2020-11-12 ENCOUNTER — TRANSCRIBE ORDERS (OUTPATIENT)
Dept: ADMINISTRATIVE | Facility: HOSPITAL | Age: 65
End: 2020-11-12

## 2020-11-12 DIAGNOSIS — Z12.31 SCREENING MAMMOGRAM FOR HIGH-RISK PATIENT: Primary | ICD-10-CM

## 2020-11-23 ENCOUNTER — HOSPITAL ENCOUNTER (OUTPATIENT)
Dept: RADIOLOGY | Facility: HOSPITAL | Age: 65
Discharge: HOME | End: 2020-11-23
Attending: FAMILY MEDICINE
Payer: COMMERCIAL

## 2020-11-23 VITALS — BODY MASS INDEX: 30.22 KG/M2 | HEIGHT: 66 IN | WEIGHT: 188 LBS

## 2020-11-23 DIAGNOSIS — Z12.31 SCREENING MAMMOGRAM FOR HIGH-RISK PATIENT: ICD-10-CM

## 2020-11-23 PROCEDURE — 77063 BREAST TOMOSYNTHESIS BI: CPT

## 2020-11-23 PROCEDURE — 77067 SCR MAMMO BI INCL CAD: CPT

## 2021-01-04 ENCOUNTER — TELEPHONE (OUTPATIENT)
Dept: CARDIOLOGY CLINIC | Facility: CLINIC | Age: 66
End: 2021-01-04

## 2021-01-28 DIAGNOSIS — I48.0 PAROXYSMAL ATRIAL FIBRILLATION (HCC): ICD-10-CM

## 2021-01-28 RX ORDER — FLECAINIDE ACETATE 100 MG/1
100 TABLET ORAL 2 TIMES DAILY
Qty: 180 TABLET | Refills: 3 | Status: SHIPPED | OUTPATIENT
Start: 2021-01-28 | End: 2021-10-31

## 2021-01-28 NOTE — TELEPHONE ENCOUNTER
[facility] FLecanide 100mg takes 2 tablets daily    Patient has appt  In March to see Dr Kathryn Bullard

## 2021-02-15 DIAGNOSIS — I48.19 PERSISTENT ATRIAL FIBRILLATION (HCC): ICD-10-CM

## 2021-02-25 ENCOUNTER — IMMUNIZATIONS (OUTPATIENT)
Dept: FAMILY MEDICINE CLINIC | Facility: HOSPITAL | Age: 66
End: 2021-02-25

## 2021-02-25 DIAGNOSIS — Z23 ENCOUNTER FOR IMMUNIZATION: Primary | ICD-10-CM

## 2021-02-25 PROCEDURE — 91301 SARS-COV-2 / COVID-19 MRNA VACCINE (MODERNA) 100 MCG: CPT

## 2021-02-25 PROCEDURE — 0011A SARS-COV-2 / COVID-19 MRNA VACCINE (MODERNA) 100 MCG: CPT

## 2021-03-04 DIAGNOSIS — E78.5 HYPERLIPIDEMIA LDL GOAL <130: ICD-10-CM

## 2021-03-04 RX ORDER — ATORVASTATIN CALCIUM 10 MG/1
TABLET, FILM COATED ORAL
Qty: 90 TABLET | Refills: 3 | Status: SHIPPED | OUTPATIENT
Start: 2021-03-04 | End: 2021-03-18 | Stop reason: SDUPTHER

## 2021-03-18 ENCOUNTER — OFFICE VISIT (OUTPATIENT)
Dept: FAMILY MEDICINE CLINIC | Facility: CLINIC | Age: 66
End: 2021-03-18
Payer: COMMERCIAL

## 2021-03-18 VITALS
TEMPERATURE: 98.6 F | RESPIRATION RATE: 16 BRPM | HEIGHT: 66 IN | WEIGHT: 189 LBS | HEART RATE: 60 BPM | SYSTOLIC BLOOD PRESSURE: 136 MMHG | BODY MASS INDEX: 30.37 KG/M2 | DIASTOLIC BLOOD PRESSURE: 70 MMHG

## 2021-03-18 DIAGNOSIS — R73.03 PREDIABETES: ICD-10-CM

## 2021-03-18 DIAGNOSIS — E78.5 HYPERLIPIDEMIA LDL GOAL <130: ICD-10-CM

## 2021-03-18 DIAGNOSIS — I10 BENIGN ESSENTIAL HYPERTENSION: Primary | ICD-10-CM

## 2021-03-18 DIAGNOSIS — R73.01 IFG (IMPAIRED FASTING GLUCOSE): ICD-10-CM

## 2021-03-18 DIAGNOSIS — I48.91 ATRIAL FIBRILLATION WITH CONTROLLED VENTRICULAR RATE (HCC): ICD-10-CM

## 2021-03-18 PROCEDURE — 99214 OFFICE O/P EST MOD 30 MIN: CPT | Performed by: FAMILY MEDICINE

## 2021-03-18 PROCEDURE — 3288F FALL RISK ASSESSMENT DOCD: CPT | Performed by: FAMILY MEDICINE

## 2021-03-18 PROCEDURE — 1101F PT FALLS ASSESS-DOCD LE1/YR: CPT | Performed by: FAMILY MEDICINE

## 2021-03-18 PROCEDURE — 3725F SCREEN DEPRESSION PERFORMED: CPT | Performed by: FAMILY MEDICINE

## 2021-03-18 RX ORDER — ATORVASTATIN CALCIUM 10 MG/1
10 TABLET, FILM COATED ORAL DAILY
Qty: 90 TABLET | Refills: 1 | Status: SHIPPED | OUTPATIENT
Start: 2021-03-18 | End: 2021-06-04 | Stop reason: SDUPTHER

## 2021-03-18 NOTE — PROGRESS NOTES
Assessment/Plan:    No problem-specific Assessment & Plan notes found for this encounter     htn stable, continue meds, labs pending  HLD on statin, continue for risk reduction, last   Prediabetes at 101 fbs, diet advised, increase exercise, check q6m  AF-cont noac, watch for bleeding, cardiology f/u, keep lytes in balance, denies any witnessed apnea but can offer testing in future if suspected due to AF    Still taking abx for mitral valve prolapse, guidelines aware     Diagnoses and all orders for this visit:    Benign essential hypertension    Hyperlipidemia LDL goal <130  -     Lipid Panel with Direct LDL reflex; Future  -     atorvastatin (LIPITOR) 10 mg tablet; Take 1 tablet (10 mg total) by mouth daily    IFG (impaired fasting glucose)  -     Comprehensive metabolic panel; Future  -     Hemoglobin A1C; Future    Atrial fibrillation with controlled ventricular rate (HCC)  -     CBC and differential; Future    Prediabetes        Return in about 6 months (around 9/18/2021)  Subjective:      Patient ID: Wallace Cleaning is a 76 y o  female  Chief Complaint   Patient presents with    Follow-up     medication ac/cma     Results    Blood Pressure Check       HPI  Cardioversion x 2  Has f/u  eliquis tolerated, no bleeding noted    Wt same  Planning to walk  Low fat diet  Low carb    Working in food [employer]  1st covid shot done    The following portions of the patient's history were reviewed and updated as appropriate: allergies, current medications, past family history, past medical history, past social history, past surgical history and problem list     Review of Systems   Constitutional: Negative for fever  Respiratory: Negative for shortness of breath            Current Outpatient Medications   Medication Sig Dispense Refill    atorvastatin (LIPITOR) 10 mg tablet Take 1 tablet (10 mg total) by mouth daily 90 tablet 1    ELIQUIS 5 MG Take 1 tablet (5 mg total) by mouth 2 (two) times a day 180 tablet 3    flecainide (TAMBOCOR) 100 mg tablet Take 1 tablet (100 mg total) by mouth 2 (two) times a day 180 tablet 3    metoprolol tartrate (LOPRESSOR) 25 mg tablet TAKE 1 TABLET BY MOUTH  EVERY 12 HOURS 180 tablet 3    amoxicillin (AMOXIL) 500 mg capsule Before dental appointments       No current facility-administered medications for this visit  Objective:    /70   Pulse 60   Temp 98 6 °F (37 °C)   Resp 16   Ht 5' 6" (1 676 m)   Wt 85 7 kg (189 lb)   BMI 30 51 kg/m²        Physical Exam  Vitals signs and nursing note reviewed  Constitutional:       Appearance: She is well-developed  She is obese  She is not ill-appearing  HENT:      Head: Normocephalic  Right Ear: Tympanic membrane normal       Left Ear: Tympanic membrane normal    Eyes:      General: No scleral icterus  Conjunctiva/sclera: Conjunctivae normal    Neck:      Musculoskeletal: Neck supple  Vascular: No carotid bruit  Cardiovascular:      Rate and Rhythm: Normal rate and regular rhythm  Heart sounds: No murmur  Pulmonary:      Effort: Pulmonary effort is normal  No respiratory distress  Breath sounds: No wheezing  Abdominal:      Palpations: Abdomen is soft  Musculoskeletal:         General: No deformity  Skin:     General: Skin is warm and dry  Coloration: Skin is not pale  Neurological:      Mental Status: She is alert  Motor: No weakness  Gait: Gait normal    Psychiatric:         Behavior: Behavior normal          Thought Content: Thought content normal          BMI Counseling: Body mass index is 30 51 kg/m²  The BMI is above normal  Nutrition recommendations include decreasing portion sizes and moderation in carbohydrate intake  Exercise recommendations include exercising 3-5 times per week  No pharmacotherapy was ordered                Florin Polk DO

## 2021-03-22 ENCOUNTER — OFFICE VISIT (OUTPATIENT)
Dept: CARDIOLOGY CLINIC | Facility: CLINIC | Age: 66
End: 2021-03-22
Payer: COMMERCIAL

## 2021-03-22 VITALS
DIASTOLIC BLOOD PRESSURE: 80 MMHG | HEIGHT: 66 IN | WEIGHT: 191 LBS | TEMPERATURE: 98.9 F | OXYGEN SATURATION: 97 % | HEART RATE: 59 BPM | SYSTOLIC BLOOD PRESSURE: 132 MMHG | BODY MASS INDEX: 30.7 KG/M2

## 2021-03-22 DIAGNOSIS — I10 BENIGN ESSENTIAL HYPERTENSION: ICD-10-CM

## 2021-03-22 DIAGNOSIS — R73.01 IFG (IMPAIRED FASTING GLUCOSE): ICD-10-CM

## 2021-03-22 DIAGNOSIS — E66.9 OBESITY (BMI 30-39.9): ICD-10-CM

## 2021-03-22 DIAGNOSIS — E78.5 DYSLIPIDEMIA: ICD-10-CM

## 2021-03-22 DIAGNOSIS — R00.2 PALPITATION: ICD-10-CM

## 2021-03-22 DIAGNOSIS — I48.0 PAROXYSMAL ATRIAL FIBRILLATION (HCC): ICD-10-CM

## 2021-03-22 PROCEDURE — 3008F BODY MASS INDEX DOCD: CPT | Performed by: INTERNAL MEDICINE

## 2021-03-22 PROCEDURE — 1160F RVW MEDS BY RX/DR IN RCRD: CPT | Performed by: INTERNAL MEDICINE

## 2021-03-22 PROCEDURE — 99214 OFFICE O/P EST MOD 30 MIN: CPT | Performed by: INTERNAL MEDICINE

## 2021-03-22 PROCEDURE — 3079F DIAST BP 80-89 MM HG: CPT | Performed by: INTERNAL MEDICINE

## 2021-03-22 PROCEDURE — 3075F SYST BP GE 130 - 139MM HG: CPT | Performed by: INTERNAL MEDICINE

## 2021-03-22 PROCEDURE — 93000 ELECTROCARDIOGRAM COMPLETE: CPT | Performed by: INTERNAL MEDICINE

## 2021-03-22 RX ORDER — APIXABAN 5 MG/1
5 TABLET, FILM COATED ORAL 2 TIMES DAILY
Qty: 180 TABLET | Refills: 3 | Status: SHIPPED | OUTPATIENT
Start: 2021-03-22 | End: 2021-10-01

## 2021-03-22 NOTE — PROGRESS NOTES
Consultation - Cardiology Office  [facility]  Mike Kennedy 76 y o  female MRN: [id number]  : 1955  Unit/Bed#:  Encounter: [id number]      Assessment:     1  Paroxysmal atrial fibrillation (HCC)    2  Benign essential hypertension    3  Dyslipidemia    4  Palpitation    5  IFG (impaired fasting glucose)    6  Obesity (BMI 30-39  9)        Discussion summary and Plan:      1  Paroxysmal atrial fibrillation  Currently doing well continue low-dose metoprolol and continue flecainide 100 mg twice a day will check flecainide level  Other labs were acceptable      2  Exertion shortness of breath palpitation  Currently stable  No further issues like this      3  Benign essential hypertension  Patient blood pressure has been acceptable  Continue increased dose of metoprolol  Blood pressure heart rate is accept        4  History of mitral valve disorder  No evidence of any significant mitral valve disease      5  History of dyslipidemia  Continue statin  She is scheduled to have repeat blood test previous cholesterol reviewed  Cholesterol was acceptable      6  History of impaired glucose metabolism    7  Class 1 obesity with BMI around 30  She is motivated lose weight    Continue current Rx  Thank you for your consultation  If he have any question please call me at [phone]    Counseling :  A description of the counseling  Goals and Barriers  Patient's ability to self care: Yes  Medication side effect reviewed with patient in detail and all their questions answered to their satisfaction  Primary Care Physician: Tatiana Goodpasture, DO      HPI :     Mike Kennedy is a 76y o  year old female who   Self-referred as she was in [facility] in the hospital with atrial fibrillation with rapid ventricular rate    Patient was at a function in [facility] when next the morning she found that she could not breathe she was taken to local hospital where she was found to be in AFib with rapid ventricular rate  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  As per patient she has a KATT guided cardioversion and she was given IV diuretics as well as Cardizem and started on Eliquis  She flew back Sunday and she has been doing well since yesterday she started feeling little lightheaded and fatigue and tired and today she came to see us in she is found to be in AFib with heart rate around 124 beats per minute  Unfortunately she has to fly tomorrow afternoon for her daughter's wedding to [place] but she is in atrial fibrillation  She is little upset with whole situation  No nausea no vomiting no PND no orthopnea  She has echo and KATT done but no stress test was done  We do not have those records available  She did bring some records from the hospital which includes labs her TSH was acceptable  Her liver enzymes were elevated  And she had probably pleural effusion as mentioned in the discharge recommendation to her  She is not taking any diuretic at this time  03/22/2021  Above reviewed  Patient came for follow-up  She is doing well  No new complaints denies any chest pain any shortness of breath  She had a blood test done in September 2020 which was acceptable cholesterol was 186 with  she is also low-dose statin therapy  No nausea no vomiting no fever no PND no orthopnea no other issues  Occasionally gets palpitation were mostly she has been doing well today 12 lead EKG shows heart rate 59 beats per minute and no significant abnormality noted  Review of Systems   Constitutional: Negative for activity change, chills, diaphoresis, fever and unexpected weight change  HENT: Negative for congestion  Eyes: Negative for discharge and redness  Respiratory: Negative for cough, chest tightness, shortness of breath and wheezing  Cardiovascular: Positive for palpitations   Negative for chest pain and leg swelling  Occasional   Gastrointestinal: Negative for abdominal pain, diarrhea and nausea  Endocrine: Negative  Genitourinary: Negative for decreased urine volume and urgency  Musculoskeletal: Negative  Negative for arthralgias, back pain and gait problem  Skin: Negative for rash and wound  Skin bruises   Allergic/Immunologic: Negative  Neurological: Negative for dizziness, seizures, syncope, weakness, light-headedness and headaches  Hematological: Negative  Psychiatric/Behavioral: Negative for agitation and confusion  The patient is not nervous/anxious          Historical Information   Past Medical History:   Diagnosis Date    Atrial fibrillation ([address] )     Hypertension     Mitral valve prolapse     Obesity     last assessed 4/27/2017     Past Surgical History:   Procedure Laterality Date    CATARACT EXTRACTION      B/L    HEMORRHOID SURGERY       Social History     Substance and Sexual Activity   Alcohol Use Yes    Alcohol/week: 3 0 standard drinks    Types: 3 Glasses of wine per week     Social History     Substance and Sexual Activity   Drug Use Never     Social History     Tobacco Use   Smoking Status Former Smoker    Types: Cigarettes   Smokeless Tobacco Never Used     Family History:   Family History   Problem Relation Age of Onset    Hyperlipidemia Mother     Hypertension Mother     No Known Problems Father     No Known Problems Sister     No Known Problems Daughter     No Known Problems Maternal Grandmother     No Known Problems Paternal Grandmother     No Known Problems Sister     No Known Problems Daughter     Pancreatic cancer Maternal Aunt     No Known Problems Maternal Aunt     No Known Problems Maternal Aunt     No Known Problems Maternal Aunt     No Known Problems Paternal Aunt     No Known Problems Paternal Aunt     No Known Problems Paternal Aunt        Meds/Allergies     No Known Allergies    Current Outpatient Medications:     amoxicillin (AMOXIL) 500 mg capsule, Before dental appointments, Disp: , Rfl:     atorvastatin (LIPITOR) 10 mg tablet, Take 1 tablet (10 mg total) by mouth daily, Disp: 90 tablet, Rfl: 1    Eliquis 5 MG, Take 1 tablet (5 mg total) by mouth 2 (two) times a day, Disp: 180 tablet, Rfl: 3    flecainide (TAMBOCOR) 100 mg tablet, Take 1 tablet (100 mg total) by mouth 2 (two) times a day, Disp: 180 tablet, Rfl: 3    metoprolol tartrate (LOPRESSOR) 25 mg tablet, Take 1 tablet (25 mg total) by mouth every 12 (twelve) hours, Disp: 180 tablet, Rfl: 3    Vitals: Blood pressure 132/80, pulse 59, temperature 98 9 °F (37 2 °C), temperature source Temporal, height 5' 6" (1 676 m), weight 86 6 kg (191 lb), SpO2 97 %  Body mass index is 30 83 kg/m²  Vitals:    03/22/21 1018   Weight: 86 6 kg (191 lb)     BP Readings from Last 3 Encounters:   03/22/21 132/80   03/18/21 136/70   09/10/20 136/80         Physical Exam  Constitutional:       General: She is not in acute distress  Appearance: She is well-developed  She is not diaphoretic  HENT:      Head: Normocephalic and atraumatic  Eyes:      Pupils: Pupils are equal, round, and reactive to light  Neck:      Musculoskeletal: Normal range of motion and neck supple  Thyroid: No thyromegaly  Vascular: No JVD  Trachea: No tracheal deviation  Cardiovascular:      Rate and Rhythm: Normal rate and regular rhythm  Heart sounds: S1 normal and S2 normal  Heart sounds not distant  Murmur present  Systolic (ejection) murmur present with a grade of 2/6  No friction rub  No gallop  No S3 or S4 sounds  Pulmonary:      Effort: Pulmonary effort is normal  No respiratory distress  Breath sounds: Normal breath sounds  No wheezing or rales  Chest:      Chest wall: No tenderness  Abdominal:      General: Bowel sounds are normal  There is no distension  Palpations: Abdomen is soft  Tenderness: There is no abdominal tenderness  There is no rebound  Musculoskeletal: Normal range of motion  General: No tenderness or deformity  Skin:     General: Skin is warm and dry  Coloration: Skin is not pale  Findings: No rash  Neurological:      Mental Status: She is alert and oriented to person, place, and time  Psychiatric:         Behavior: Behavior normal          Judgment: Judgment normal            Diagnostic Studies Review Cardio:  Echo stress test reviewed    EKG:      Twelve lead EKG done in our office on 04/23/2019 shows atrial fibrillation with rapid ventricular rate heart rate 124 beats per minute  Twelve lead EKG done in our office 05/17/2019 shows normal sinus rhythm heart rate 58 beats per minute  Left atrial enlargement  Incomplete RBBB  As compared to old EKG patient is now in sinus rhythm  Twelve lead EKG done 09/24/2019 shows normal sinus rhythm RSR pattern heart rate 78 beats per minute no significant change from old EKG patient is staying in sinus rhythm  Twelve lead EKG shows atrial fibrillation with heart rate 139 beats per minute  As compared to previous EKG she is now in atrial fibrillation  Twelve lead EKG 03/22/2021 shows sinus rhythm heart rate 59 beats per minute incomplete RBBB  No change from previous EKG QS in V1 to V3 most likely due to lead location      Imaging:    Lab Review     BMP:  Lab Results   Component Value Date    SODIUM 142 09/03/2020    K 4 4 09/03/2020     09/03/2020    CO2 24 09/03/2020    BUN 11 09/03/2020    CREATININE 0 89 09/03/2020    GLUC 101 (H) 09/03/2020    CALCIUM 9 0 01/20/2020    EGFR 75 01/20/2020     LFT:  Lab Results   Component Value Date    AST 21 09/03/2020    ALT 25 09/03/2020    ALKPHOS 90 01/20/2020    TP 6 6 09/03/2020    ALB 4 5 09/03/2020      No results found for: [facility]  Lab Results   Component Value Date    HGBA1C 5 4 09/03/2020     Lipid Profile:   Lab Results   Component Value Date    CHOLESTEROL 186 09/03/2020    HDL 52 09/03/2020    [facility] 110 (H) 09/03/2020    TRIG 133 09/03/2020     Lab Results   Component Value Date    CHOLESTEROL 186 09/03/2020    CHOLESTEROL 221 (H) 05/26/2018         Dr Casimiro Jiménez MD [facility] [place]      "This note has been constructed using a voice recognition system  Therefore there may be syntax, spelling, and/or grammatical errors   Please call if you have any questions  "

## 2021-03-25 ENCOUNTER — IMMUNIZATIONS (OUTPATIENT)
Dept: FAMILY MEDICINE CLINIC | Facility: HOSPITAL | Age: 66
End: 2021-03-25

## 2021-03-25 DIAGNOSIS — Z23 ENCOUNTER FOR IMMUNIZATION: Primary | ICD-10-CM

## 2021-03-25 PROCEDURE — 0012A SARS-COV-2 / COVID-19 MRNA VACCINE (MODERNA) 100 MCG: CPT

## 2021-03-25 PROCEDURE — 91301 SARS-COV-2 / COVID-19 MRNA VACCINE (MODERNA) 100 MCG: CPT

## 2021-06-04 DIAGNOSIS — E78.5 HYPERLIPIDEMIA LDL GOAL <130: ICD-10-CM

## 2021-06-04 RX ORDER — ATORVASTATIN CALCIUM 10 MG/1
10 TABLET, FILM COATED ORAL DAILY
Qty: 90 TABLET | Refills: 1 | Status: SHIPPED | OUTPATIENT
Start: 2021-06-04 | End: 2021-09-02

## 2021-07-30 LAB
ALBUMIN SERPL-MCNC: 4.5 G/DL (ref 3.8–4.8)
ALBUMIN/GLOB SERPL: 2 {RATIO} (ref 1.2–2.2)
ALP SERPL-CCNC: 96 IU/L (ref 48–121)
ALT SERPL-CCNC: 18 IU/L (ref 0–32)
AST SERPL-CCNC: 19 IU/L (ref 0–40)
BASOPHILS # BLD AUTO: 0 X10E3/UL (ref 0–0.2)
BASOPHILS NFR BLD AUTO: 0 %
BILIRUB SERPL-MCNC: 0.5 MG/DL (ref 0–1.2)
BUN SERPL-MCNC: 14 MG/DL (ref 8–27)
BUN/CREAT SERPL: 16 (ref 12–28)
CALCIUM SERPL-MCNC: 9 MG/DL (ref 8.7–10.3)
CHLORIDE SERPL-SCNC: 105 MMOL/L (ref 96–106)
CHOLEST SERPL-MCNC: 205 MG/DL (ref 100–199)
CO2 SERPL-SCNC: 25 MMOL/L (ref 20–29)
CREAT SERPL-MCNC: 0.88 MG/DL (ref 0.57–1)
EOSINOPHIL # BLD AUTO: 0.1 X10E3/UL (ref 0–0.4)
EOSINOPHIL NFR BLD AUTO: 2 %
ERYTHROCYTE [DISTWIDTH] IN BLOOD BY AUTOMATED COUNT: 13.3 % (ref 11.7–15.4)
GLOBULIN SER-MCNC: 2.3 G/DL (ref 1.5–4.5)
GLUCOSE SERPL-MCNC: 97 MG/DL (ref 65–99)
HBA1C MFR BLD: 5.4 % (ref 4.8–5.6)
HCT VFR BLD AUTO: 41.1 % (ref 34–46.6)
HDLC SERPL-MCNC: 54 MG/DL
HGB BLD-MCNC: 13.7 G/DL (ref 11.1–15.9)
IMM GRANULOCYTES # BLD: 0 X10E3/UL (ref 0–0.1)
IMM GRANULOCYTES NFR BLD: 0 %
LDLC SERPL CALC-MCNC: 131 MG/DL (ref 0–99)
LYMPHOCYTES # BLD AUTO: 1.2 X10E3/UL (ref 0.7–3.1)
LYMPHOCYTES NFR BLD AUTO: 19 %
MCH RBC QN AUTO: 28.8 PG (ref 26.6–33)
MCHC RBC AUTO-ENTMCNC: 33.3 G/DL (ref 31.5–35.7)
MCV RBC AUTO: 87 FL (ref 79–97)
MICRODELETION SYND BLD/T FISH: NORMAL
MONOCYTES # BLD AUTO: 0.5 X10E3/UL (ref 0.1–0.9)
MONOCYTES NFR BLD AUTO: 7 %
NEUTROPHILS # BLD AUTO: 4.7 X10E3/UL (ref 1.4–7)
NEUTROPHILS NFR BLD AUTO: 72 %
PLATELET # BLD AUTO: 224 X10E3/UL (ref 150–450)
POTASSIUM SERPL-SCNC: 4.1 MMOL/L (ref 3.5–5.2)
PROT SERPL-MCNC: 6.8 G/DL (ref 6–8.5)
RBC # BLD AUTO: 4.75 X10E6/UL (ref 3.77–5.28)
SL AMB EGFR AFRICAN AMERICAN: 79 ML/MIN/1.73
SL AMB EGFR NON AFRICAN AMERICAN: 68 ML/MIN/1.73
SODIUM SERPL-SCNC: 142 MMOL/L (ref 134–144)
TRIGL SERPL-MCNC: 113 MG/DL (ref 0–149)
WBC # BLD AUTO: 6.5 X10E3/UL (ref 3.4–10.8)

## 2021-08-02 ENCOUNTER — TELEPHONE (OUTPATIENT)
Dept: CARDIOLOGY CLINIC | Facility: CLINIC | Age: 66
End: 2021-08-02

## 2021-08-02 NOTE — TELEPHONE ENCOUNTER
----- Message from Cj Tamez MD sent at 8/2/2021  9:40 AM EDT -----  Patient blood test reviewed  They are acceptable  Will continue same medication  Patient should keep his appointment    Flecainide level is acceptable

## 2021-08-03 ENCOUNTER — TELEPHONE (OUTPATIENT)
Dept: CARDIOLOGY CLINIC | Facility: CLINIC | Age: 66
End: 2021-08-03

## 2021-08-03 LAB — FLECAINIDE SERPL-MCNC: 0.49 UG/ML (ref 0.2–1)

## 2021-08-03 NOTE — TELEPHONE ENCOUNTER
----- Message from Kana Padilla MD sent at 8/3/2021  9:34 AM EDT -----  Patient blood test reviewed  They are acceptable  Will continue same medication  Patient should keep his appointment

## 2021-09-02 DIAGNOSIS — E78.5 HYPERLIPIDEMIA LDL GOAL <130: ICD-10-CM

## 2021-09-02 RX ORDER — ATORVASTATIN CALCIUM 10 MG/1
TABLET, FILM COATED ORAL
Qty: 90 TABLET | Refills: 1 | Status: SHIPPED | OUTPATIENT
Start: 2021-09-02 | End: 2021-09-24 | Stop reason: SDUPTHER

## 2021-09-24 ENCOUNTER — OFFICE VISIT (OUTPATIENT)
Dept: FAMILY MEDICINE CLINIC | Facility: CLINIC | Age: 66
End: 2021-09-24
Payer: COMMERCIAL

## 2021-09-24 VITALS
HEIGHT: 66 IN | DIASTOLIC BLOOD PRESSURE: 86 MMHG | TEMPERATURE: 98.8 F | BODY MASS INDEX: 31.18 KG/M2 | HEART RATE: 63 BPM | WEIGHT: 194 LBS | SYSTOLIC BLOOD PRESSURE: 138 MMHG | RESPIRATION RATE: 17 BRPM

## 2021-09-24 DIAGNOSIS — I10 BENIGN ESSENTIAL HYPERTENSION: Primary | ICD-10-CM

## 2021-09-24 DIAGNOSIS — Z12.31 BREAST CANCER SCREENING BY MAMMOGRAM: ICD-10-CM

## 2021-09-24 DIAGNOSIS — E78.5 HYPERLIPIDEMIA LDL GOAL <130: ICD-10-CM

## 2021-09-24 DIAGNOSIS — Z78.0 POSTMENOPAUSAL STATE: ICD-10-CM

## 2021-09-24 DIAGNOSIS — I48.91 ATRIAL FIBRILLATION WITH CONTROLLED VENTRICULAR RATE (HCC): ICD-10-CM

## 2021-09-24 DIAGNOSIS — Z23 IMMUNIZATION DUE: ICD-10-CM

## 2021-09-24 DIAGNOSIS — E78.5 DYSLIPIDEMIA: ICD-10-CM

## 2021-09-24 PROBLEM — R73.03 PREDIABETES: Status: RESOLVED | Noted: 2017-04-27 | Resolved: 2021-09-24

## 2021-09-24 PROCEDURE — 3008F BODY MASS INDEX DOCD: CPT | Performed by: FAMILY MEDICINE

## 2021-09-24 PROCEDURE — 90662 IIV NO PRSV INCREASED AG IM: CPT

## 2021-09-24 PROCEDURE — 3079F DIAST BP 80-89 MM HG: CPT | Performed by: FAMILY MEDICINE

## 2021-09-24 PROCEDURE — 90750 HZV VACC RECOMBINANT IM: CPT

## 2021-09-24 PROCEDURE — 99214 OFFICE O/P EST MOD 30 MIN: CPT | Performed by: FAMILY MEDICINE

## 2021-09-24 PROCEDURE — 90472 IMMUNIZATION ADMIN EACH ADD: CPT

## 2021-09-24 PROCEDURE — 1160F RVW MEDS BY RX/DR IN RCRD: CPT | Performed by: FAMILY MEDICINE

## 2021-09-24 PROCEDURE — 90471 IMMUNIZATION ADMIN: CPT

## 2021-09-24 PROCEDURE — 1036F TOBACCO NON-USER: CPT | Performed by: FAMILY MEDICINE

## 2021-09-24 PROCEDURE — 3725F SCREEN DEPRESSION PERFORMED: CPT | Performed by: FAMILY MEDICINE

## 2021-09-24 PROCEDURE — 3075F SYST BP GE 130 - 139MM HG: CPT | Performed by: FAMILY MEDICINE

## 2021-09-24 RX ORDER — ATORVASTATIN CALCIUM 10 MG/1
10 TABLET, FILM COATED ORAL DAILY
Qty: 90 TABLET | Refills: 1 | Status: SHIPPED | OUTPATIENT
Start: 2021-09-24 | End: 2021-11-26 | Stop reason: SDUPTHER

## 2021-09-24 NOTE — PROGRESS NOTES
Assessment/Plan:    No problem-specific Assessment & Plan notes found for this encounter  Htn/af/hld stable  Continue meds  Labs reviewed  Continue same  Wt loss  F/u q6m     Diagnoses and all orders for this visit:    Benign essential hypertension  -     Comprehensive metabolic panel; Future    Breast cancer screening by mammogram  -     Mammo screening bilateral w 3d & cad; Future    Atrial fibrillation with controlled ventricular rate (HCC)    Dyslipidemia    Hyperlipidemia LDL goal <130  -     atorvastatin (LIPITOR) 10 mg tablet; Take 1 tablet (10 mg total) by mouth daily    Postmenopausal state  -     DXA bone density spine hip and pelvis; Future    Immunization due  -     Zoster Vaccine Recombinant IM  -     influenza vaccine, high-dose, PF 0 7 mL (FLUZONE HIGH-DOSE)    Other orders  -     Cancel: Mammo screening bilateral w 3d & cad; Future        Return in about 6 months (around 3/24/2022) for Recheck  Subjective:      Patient ID: Dony Graf is a 78 y o  female  Chief Complaint   Patient presents with    Follow-up     6 month nm lpn       HPI  Taking all meds    eliquis expensive  Sees Dr Hsieh Senior  PAF    Fruits/veggies  Not consistent  Still working    Sometimes not strict with diet  Not been exercising  Some walking      The following portions of the patient's history were reviewed and updated as appropriate: allergies, current medications, past family history, past medical history, past social history, past surgical history and problem list     Review of Systems   Constitutional: Negative for chills and fever           Current Outpatient Medications   Medication Sig Dispense Refill    amoxicillin (AMOXIL) 500 mg capsule Before dental appointments      atorvastatin (LIPITOR) 10 mg tablet Take 1 tablet (10 mg total) by mouth daily 90 tablet 1    Eliquis 5 MG Take 1 tablet (5 mg total) by mouth 2 (two) times a day 180 tablet 3    flecainide (TAMBOCOR) 100 mg tablet Take 1 tablet (100 mg total) by mouth 2 (two) times a day 180 tablet 3    metoprolol tartrate (LOPRESSOR) 25 mg tablet Take 1 tablet (25 mg total) by mouth every 12 (twelve) hours 180 tablet 3     No current facility-administered medications for this visit  Objective:    /86   Pulse 63   Temp 98 8 °F (37 1 °C)   Resp 17   Ht 5' 6" (1 676 m)   Wt 88 kg (194 lb)   BMI 31 31 kg/m²        Physical Exam  Vitals and nursing note reviewed  Constitutional:       Appearance: She is well-developed  She is obese  She is not ill-appearing  HENT:      Head: Normocephalic  Right Ear: Tympanic membrane and ear canal normal       Left Ear: Tympanic membrane and ear canal normal       Mouth/Throat:      Mouth: Mucous membranes are moist    Eyes:      General: No scleral icterus  Conjunctiva/sclera: Conjunctivae normal    Neck:      Vascular: No carotid bruit  Cardiovascular:      Rate and Rhythm: Normal rate and regular rhythm  Pulmonary:      Effort: Pulmonary effort is normal  No respiratory distress  Abdominal:      Palpations: Abdomen is soft  Musculoskeletal:         General: No deformity  Cervical back: Neck supple  Right lower leg: No edema  Left lower leg: No edema  Skin:     General: Skin is warm and dry  Coloration: Skin is not jaundiced or pale  Neurological:      Mental Status: She is alert  Motor: No weakness  Gait: Gait normal    Psychiatric:         Behavior: Behavior normal          Thought Content: Thought content normal          BMI Counseling: Body mass index is 31 31 kg/m²  The BMI is above normal  Nutrition recommendations include decreasing portion sizes and moderation in carbohydrate intake  Exercise recommendations include exercising 3-5 times per week  No pharmacotherapy was ordered  Rationale for BMI follow-up plan is due to patient being overweight or obese       Depression Screening and Follow-up Plan:   Patient was screened for depression during today's encounter  They screened negative with a PHQ-2 score of 0             William Campbell, DO

## 2021-09-25 PROBLEM — Z12.31 BREAST CANCER SCREENING BY MAMMOGRAM: Status: ACTIVE | Noted: 2021-09-25

## 2021-09-25 PROBLEM — Z78.0 POSTMENOPAUSAL STATE: Status: ACTIVE | Noted: 2021-09-25

## 2021-10-01 ENCOUNTER — OFFICE VISIT (OUTPATIENT)
Dept: CARDIOLOGY CLINIC | Facility: CLINIC | Age: 66
End: 2021-10-01
Payer: COMMERCIAL

## 2021-10-01 VITALS
DIASTOLIC BLOOD PRESSURE: 86 MMHG | HEART RATE: 54 BPM | TEMPERATURE: 98.6 F | BODY MASS INDEX: 31.18 KG/M2 | OXYGEN SATURATION: 98 % | SYSTOLIC BLOOD PRESSURE: 140 MMHG | HEIGHT: 66 IN | WEIGHT: 194 LBS

## 2021-10-01 DIAGNOSIS — E66.9 OBESITY (BMI 30-39.9): ICD-10-CM

## 2021-10-01 DIAGNOSIS — I48.0 PAROXYSMAL ATRIAL FIBRILLATION (HCC): ICD-10-CM

## 2021-10-01 DIAGNOSIS — R00.2 PALPITATION: ICD-10-CM

## 2021-10-01 DIAGNOSIS — I10 BENIGN ESSENTIAL HYPERTENSION: ICD-10-CM

## 2021-10-01 DIAGNOSIS — E78.5 DYSLIPIDEMIA: ICD-10-CM

## 2021-10-01 DIAGNOSIS — R06.02 EXERTIONAL SHORTNESS OF BREATH: ICD-10-CM

## 2021-10-01 PROCEDURE — 3008F BODY MASS INDEX DOCD: CPT | Performed by: INTERNAL MEDICINE

## 2021-10-01 PROCEDURE — 1160F RVW MEDS BY RX/DR IN RCRD: CPT | Performed by: INTERNAL MEDICINE

## 2021-10-01 PROCEDURE — 3079F DIAST BP 80-89 MM HG: CPT | Performed by: INTERNAL MEDICINE

## 2021-10-01 PROCEDURE — 1036F TOBACCO NON-USER: CPT | Performed by: INTERNAL MEDICINE

## 2021-10-01 PROCEDURE — 93000 ELECTROCARDIOGRAM COMPLETE: CPT | Performed by: INTERNAL MEDICINE

## 2021-10-01 PROCEDURE — 3077F SYST BP >= 140 MM HG: CPT | Performed by: INTERNAL MEDICINE

## 2021-10-01 PROCEDURE — 99214 OFFICE O/P EST MOD 30 MIN: CPT | Performed by: INTERNAL MEDICINE

## 2021-10-01 RX ORDER — APIXABAN 5 MG/1
TABLET, FILM COATED ORAL
Qty: 60 TABLET | Refills: 3 | Status: SHIPPED | OUTPATIENT
Start: 2021-10-01 | End: 2022-01-02

## 2021-10-01 RX ORDER — AMLODIPINE BESYLATE 2.5 MG/1
2.5 TABLET ORAL DAILY
Qty: 30 TABLET | Refills: 5 | Status: SHIPPED | OUTPATIENT
Start: 2021-10-01 | End: 2022-02-28

## 2021-10-30 DIAGNOSIS — I48.0 PAROXYSMAL ATRIAL FIBRILLATION (HCC): ICD-10-CM

## 2021-10-31 RX ORDER — FLECAINIDE ACETATE 100 MG/1
TABLET ORAL
Qty: 180 TABLET | Refills: 3 | Status: SHIPPED | OUTPATIENT
Start: 2021-10-31 | End: 2021-12-28 | Stop reason: SDUPTHER

## 2021-11-26 DIAGNOSIS — E78.5 HYPERLIPIDEMIA LDL GOAL <130: ICD-10-CM

## 2021-11-26 RX ORDER — ATORVASTATIN CALCIUM 10 MG/1
10 TABLET, FILM COATED ORAL DAILY
Qty: 90 TABLET | Refills: 1 | Status: SHIPPED | OUTPATIENT
Start: 2021-11-26 | End: 2022-02-28

## 2021-12-28 DIAGNOSIS — I48.0 PAROXYSMAL ATRIAL FIBRILLATION (HCC): ICD-10-CM

## 2021-12-28 RX ORDER — FLECAINIDE ACETATE 100 MG/1
100 TABLET ORAL 2 TIMES DAILY
Qty: 180 TABLET | Refills: 3 | Status: SHIPPED | OUTPATIENT
Start: 2021-12-28 | End: 2022-01-28 | Stop reason: SDUPTHER

## 2021-12-30 DIAGNOSIS — I48.0 PAROXYSMAL ATRIAL FIBRILLATION (HCC): ICD-10-CM

## 2022-01-02 RX ORDER — APIXABAN 5 MG/1
TABLET, FILM COATED ORAL
Qty: 60 TABLET | Refills: 3 | Status: SHIPPED | OUTPATIENT
Start: 2022-01-02 | End: 2022-01-28 | Stop reason: SDUPTHER

## 2022-01-04 ENCOUNTER — HOSPITAL ENCOUNTER (OUTPATIENT)
Dept: RADIOLOGY | Facility: HOSPITAL | Age: 67
Discharge: HOME | End: 2022-01-04
Attending: FAMILY MEDICINE
Payer: COMMERCIAL

## 2022-01-04 VITALS — BODY MASS INDEX: 31.18 KG/M2 | HEIGHT: 66 IN | WEIGHT: 194 LBS

## 2022-01-04 DIAGNOSIS — Z12.31 BREAST CANCER SCREENING BY MAMMOGRAM: ICD-10-CM

## 2022-01-04 DIAGNOSIS — Z78.0 POSTMENOPAUSAL STATE: ICD-10-CM

## 2022-01-04 PROCEDURE — 77080 DXA BONE DENSITY AXIAL: CPT

## 2022-01-04 PROCEDURE — 77063 BREAST TOMOSYNTHESIS BI: CPT

## 2022-01-04 PROCEDURE — 77067 SCR MAMMO BI INCL CAD: CPT

## 2022-01-28 DIAGNOSIS — I48.0 PAROXYSMAL ATRIAL FIBRILLATION (HCC): ICD-10-CM

## 2022-01-31 RX ORDER — FLECAINIDE ACETATE 100 MG/1
100 TABLET ORAL 2 TIMES DAILY
Qty: 180 TABLET | Refills: 3 | Status: SHIPPED | OUTPATIENT
Start: 2022-01-31

## 2022-01-31 RX ORDER — APIXABAN 5 MG/1
5 TABLET, FILM COATED ORAL 2 TIMES DAILY
Qty: 180 TABLET | Refills: 3 | Status: SHIPPED | OUTPATIENT
Start: 2022-01-31

## 2022-02-03 ENCOUNTER — TELEPHONE (OUTPATIENT)
Dept: CARDIOLOGY CLINIC | Facility: CLINIC | Age: 67
End: 2022-02-03

## 2022-02-03 NOTE — TELEPHONE ENCOUNTER
Your information has been submitted to [employer]  To check for an updated outcome later, reopen this PA request from your dashboard  If [facility] has not responded to your request within 24 hours, contact [employer] at [phone]  If you think there may be a problem with your PA request, use our live chat feature at the bottom right

## 2022-02-07 DIAGNOSIS — I48.0 PAROXYSMAL ATRIAL FIBRILLATION (HCC): ICD-10-CM

## 2022-02-28 DIAGNOSIS — I10 BENIGN ESSENTIAL HYPERTENSION: ICD-10-CM

## 2022-02-28 DIAGNOSIS — E78.5 HYPERLIPIDEMIA LDL GOAL <130: ICD-10-CM

## 2022-02-28 RX ORDER — AMLODIPINE BESYLATE 2.5 MG/1
TABLET ORAL
Qty: 30 TABLET | Refills: 5 | Status: SHIPPED | OUTPATIENT
Start: 2022-02-28 | End: 2022-06-20 | Stop reason: SDUPTHER

## 2022-02-28 RX ORDER — ATORVASTATIN CALCIUM 10 MG/1
10 TABLET, FILM COATED ORAL DAILY
Qty: 90 TABLET | Refills: 1 | Status: SHIPPED | OUTPATIENT
Start: 2022-02-28 | End: 2022-05-27 | Stop reason: SDUPTHER

## 2022-05-13 NOTE — TELEPHONE ENCOUNTER
02/05/2022   On behalf of the [employer]), as their utilization review entity, reviewed your request for authorization of coverage for Eliquis 5MG OR TABS       Based on the review of information submitted, we have approved your request  As long as you remain covered by your prescription drug plan and there are no changes to your plan benefits, this request is approved for the following time period: 02/05/2022 - 02/05/2023 Approval

## 2022-05-27 DIAGNOSIS — E78.5 HYPERLIPIDEMIA LDL GOAL <130: ICD-10-CM

## 2022-06-01 RX ORDER — ATORVASTATIN CALCIUM 10 MG/1
10 TABLET, FILM COATED ORAL DAILY
Qty: 90 TABLET | Refills: 1 | Status: SHIPPED | OUTPATIENT
Start: 2022-06-01

## 2022-06-14 ENCOUNTER — RA CDI HCC (OUTPATIENT)
Dept: OTHER | Facility: HOSPITAL | Age: 67
End: 2022-06-14

## 2022-06-14 LAB
ALBUMIN SERPL-MCNC: 4.5 G/DL (ref 3.8–4.8)
ALBUMIN/GLOB SERPL: 2.1 {RATIO} (ref 1.2–2.2)
ALP SERPL-CCNC: 92 IU/L (ref 44–121)
ALT SERPL-CCNC: 21 IU/L (ref 0–32)
AST SERPL-CCNC: 20 IU/L (ref 0–40)
BILIRUB SERPL-MCNC: 0.3 MG/DL (ref 0–1.2)
BUN SERPL-MCNC: 14 MG/DL (ref 8–27)
BUN/CREAT SERPL: 16 (ref 12–28)
CALCIUM SERPL-MCNC: 9.4 MG/DL (ref 8.7–10.3)
CHLORIDE SERPL-SCNC: 104 MMOL/L (ref 96–106)
CO2 SERPL-SCNC: 26 MMOL/L (ref 20–29)
CREAT SERPL-MCNC: 0.9 MG/DL (ref 0.57–1)
EGFR: 70 ML/MIN/1.73
GLOBULIN SER-MCNC: 2.1 G/DL (ref 1.5–4.5)
GLUCOSE SERPL-MCNC: 102 MG/DL (ref 65–99)
POTASSIUM SERPL-SCNC: 4.4 MMOL/L (ref 3.5–5.2)
PROT SERPL-MCNC: 6.6 G/DL (ref 6–8.5)
SODIUM SERPL-SCNC: 142 MMOL/L (ref 134–144)

## 2022-06-14 NOTE — PROGRESS NOTES
[facility]  coding opportunities       Chart reviewed, no opportunity found: CHART REVIEWED, NO OPPORTUNITY FOUND        Patients Insurance        Commercial Insurance: [employer]

## 2022-06-20 ENCOUNTER — OFFICE VISIT (OUTPATIENT)
Dept: FAMILY MEDICINE CLINIC | Facility: CLINIC | Age: 67
End: 2022-06-20
Payer: COMMERCIAL

## 2022-06-20 ENCOUNTER — TELEPHONE (OUTPATIENT)
Dept: FAMILY MEDICINE CLINIC | Facility: CLINIC | Age: 67
End: 2022-06-20

## 2022-06-20 VITALS
HEIGHT: 66 IN | BODY MASS INDEX: 31.5 KG/M2 | TEMPERATURE: 97.9 F | WEIGHT: 196 LBS | HEART RATE: 78 BPM | SYSTOLIC BLOOD PRESSURE: 156 MMHG | DIASTOLIC BLOOD PRESSURE: 78 MMHG | OXYGEN SATURATION: 99 % | RESPIRATION RATE: 16 BRPM

## 2022-06-20 DIAGNOSIS — R73.01 IFG (IMPAIRED FASTING GLUCOSE): ICD-10-CM

## 2022-06-20 DIAGNOSIS — I48.91 ATRIAL FIBRILLATION WITH CONTROLLED VENTRICULAR RATE (HCC): ICD-10-CM

## 2022-06-20 DIAGNOSIS — E78.5 HYPERLIPIDEMIA LDL GOAL <130: ICD-10-CM

## 2022-06-20 DIAGNOSIS — I10 BENIGN ESSENTIAL HYPERTENSION: Primary | ICD-10-CM

## 2022-06-20 PROCEDURE — 3078F DIAST BP <80 MM HG: CPT | Performed by: FAMILY MEDICINE

## 2022-06-20 PROCEDURE — 1160F RVW MEDS BY RX/DR IN RCRD: CPT | Performed by: FAMILY MEDICINE

## 2022-06-20 PROCEDURE — 1036F TOBACCO NON-USER: CPT | Performed by: FAMILY MEDICINE

## 2022-06-20 PROCEDURE — 3008F BODY MASS INDEX DOCD: CPT | Performed by: FAMILY MEDICINE

## 2022-06-20 PROCEDURE — 3725F SCREEN DEPRESSION PERFORMED: CPT | Performed by: FAMILY MEDICINE

## 2022-06-20 PROCEDURE — 3077F SYST BP >= 140 MM HG: CPT | Performed by: FAMILY MEDICINE

## 2022-06-20 PROCEDURE — 1101F PT FALLS ASSESS-DOCD LE1/YR: CPT | Performed by: FAMILY MEDICINE

## 2022-06-20 PROCEDURE — 99214 OFFICE O/P EST MOD 30 MIN: CPT | Performed by: FAMILY MEDICINE

## 2022-06-20 PROCEDURE — 3288F FALL RISK ASSESSMENT DOCD: CPT | Performed by: FAMILY MEDICINE

## 2022-06-20 RX ORDER — AMLODIPINE BESYLATE 5 MG/1
5 TABLET ORAL DAILY
Qty: 90 TABLET | Refills: 1 | Status: SHIPPED | OUTPATIENT
Start: 2022-06-20

## 2022-06-20 NOTE — PATIENT INSTRUCTIONS
To better control your blood pressure to a goal of <140/90, please continue the metoprolol 25mg twice a day but increase the amlodipine 2 5mg to 5mg once a day

## 2022-06-20 NOTE — PROGRESS NOTES
Assessment/Plan:    No problem-specific Assessment & Plan notes found for this encounter     htn not at goal, increase norvasc 2 5mg to 5mg qd  Edema risk advised  F/u 1m here or at cardio but contact me if not at goal to adjust meds and f/u    Prediabetes aware  Work on diet  Monitor in 6m with labs    bmi and wt loss discussedd    HLD on statin and for risk reduction, LFT wnl, cont same     Diagnoses and all orders for this visit:    Benign essential hypertension  -     amLODIPine (NORVASC) 5 mg tablet; Take 1 tablet (5 mg total) by mouth daily  -     Comprehensive metabolic panel; Future  -     Hemoglobin A1C; Future    Hyperlipidemia LDL goal <130  -     Lipid Panel with Direct LDL reflex; Future  -     Comprehensive metabolic panel; Future  -     Hemoglobin A1C; Future    IFG (impaired fasting glucose)  -     Hemoglobin A1C; Future    Atrial fibrillation with controlled ventricular rate (HCC)        Return in about 6 months (around 12/14/2022)  Subjective:      Patient ID: Shala Haile is a 75 y o  female  Chief Complaint   Patient presents with    Follow-up     On meds  Sabas Myers MA        HPI  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  Sees cardio Dr Larry Simmons, plans f/u for AF  No palpitations or bleeding noted  2 episodes of afib and two cardioversions  Been exercising  No wt loss  Hx of covid infection  Aware tambocor and eliquis are contraindications for paxlovid if needed in future    The following portions of the patient's history were reviewed and updated as appropriate: allergies, current medications, past family history, past medical history, past social history, past surgical history and problem list     Review of Systems   Respiratory: Negative for shortness of breath  Cardiovascular: Negative for palpitations           Current Outpatient Medications   Medication Sig Dispense Refill    amLODIPine (NORVASC) 5 mg tablet Take 1 tablet (5 mg total) by mouth daily 90 tablet 1    atorvastatin (LIPITOR) 10 mg tablet Take 1 tablet (10 mg total) by mouth daily 90 tablet 1    Eliquis 5 MG Take 1 tablet (5 mg total) by mouth 2 (two) times a day 180 tablet 3    flecainide (TAMBOCOR) 100 mg tablet Take 1 tablet (100 mg total) by mouth 2 (two) times a day 180 tablet 3    metoprolol tartrate (LOPRESSOR) 25 mg tablet TAKE 1 TABLET EVERY 12 HOURS 180 tablet 3    amoxicillin (AMOXIL) 500 mg capsule Before dental appointments (Patient not taking: No sig reported)       No current facility-administered medications for this visit  Objective:    /78   Pulse 78   Temp 97 9 °F (36 6 °C)   Resp 16   Ht 5' 6" (1 676 m)   Wt 88 9 kg (196 lb)   SpO2 99%   BMI 31 64 kg/m²        Physical Exam  Vitals and nursing note reviewed  Constitutional:       Appearance: She is well-developed  She is not ill-appearing  HENT:      Head: Normocephalic  Right Ear: Tympanic membrane normal       Left Ear: Tympanic membrane normal    Eyes:      General: No scleral icterus  Conjunctiva/sclera: Conjunctivae normal    Cardiovascular:      Rate and Rhythm: Normal rate and regular rhythm  Heart sounds: No murmur heard  Pulmonary:      Effort: Pulmonary effort is normal  No respiratory distress  Breath sounds: No wheezing or rales  Abdominal:      Palpations: Abdomen is soft  Musculoskeletal:         General: No deformity  Cervical back: Neck supple  Right lower leg: No edema  Left lower leg: No edema  Skin:     General: Skin is warm and dry  Coloration: Skin is not pale  Neurological:      Mental Status: She is alert  Motor: No weakness  Gait: Gait normal    Psychiatric:         Behavior: Behavior normal          Thought Content:  Thought content normal                 Inder Austin,

## 2022-08-05 ENCOUNTER — OFFICE VISIT (OUTPATIENT)
Dept: CARDIOLOGY CLINIC | Facility: CLINIC | Age: 67
End: 2022-08-05
Payer: COMMERCIAL

## 2022-08-05 VITALS
WEIGHT: 198 LBS | HEIGHT: 66 IN | TEMPERATURE: 97 F | BODY MASS INDEX: 31.82 KG/M2 | SYSTOLIC BLOOD PRESSURE: 120 MMHG | OXYGEN SATURATION: 97 % | DIASTOLIC BLOOD PRESSURE: 82 MMHG | HEART RATE: 63 BPM

## 2022-08-05 DIAGNOSIS — E78.5 DYSLIPIDEMIA: ICD-10-CM

## 2022-08-05 DIAGNOSIS — R00.2 PALPITATION: ICD-10-CM

## 2022-08-05 DIAGNOSIS — E66.9 OBESITY (BMI 30-39.9): ICD-10-CM

## 2022-08-05 DIAGNOSIS — I10 BENIGN ESSENTIAL HYPERTENSION: ICD-10-CM

## 2022-08-05 DIAGNOSIS — I48.0 PAROXYSMAL ATRIAL FIBRILLATION (HCC): ICD-10-CM

## 2022-08-05 DIAGNOSIS — R06.02 EXERTIONAL SHORTNESS OF BREATH: ICD-10-CM

## 2022-08-05 PROCEDURE — 93000 ELECTROCARDIOGRAM COMPLETE: CPT | Performed by: INTERNAL MEDICINE

## 2022-08-05 PROCEDURE — 99214 OFFICE O/P EST MOD 30 MIN: CPT | Performed by: INTERNAL MEDICINE

## 2022-08-05 PROCEDURE — 1160F RVW MEDS BY RX/DR IN RCRD: CPT | Performed by: INTERNAL MEDICINE

## 2022-08-05 PROCEDURE — 3074F SYST BP LT 130 MM HG: CPT | Performed by: INTERNAL MEDICINE

## 2022-08-05 PROCEDURE — 3079F DIAST BP 80-89 MM HG: CPT | Performed by: INTERNAL MEDICINE

## 2022-08-05 NOTE — PROGRESS NOTES
Consultation - Cardiology Office  [facility]  Josefina Fry 75 y o  female MRN: [id number]  : 1955  Unit/Bed#:  Encounter: [id number]      Assessment:     1  Paroxysmal atrial fibrillation (HCC)    2  Benign essential hypertension    3  Exertional shortness of breath    4  Dyslipidemia    5  Palpitation    6  Obesity (BMI 30-39  9)        Discussion summary and Plan:      1  Paroxysmal atrial fibrillation  Currently doing well continue low-dose metoprolol and continue flecainide 100 mg twice a day  Her flecainide level in 2021 0 49  Continue metoprolol and flecainide at the current doses  Will check flecainide level again  She had a brief period of atrial fibrillation but she spontaneously converted after few minutes  She is maintaining sinus rhythm  2  Exertion shortness of breath  She has mild exertional shortness of breath particularly when she exert much more her normal activities  She had a normal nuclear stress test in 2019 and normal LV systolic function   No change in 6      3  Benign essential hypertension  Blood pressure was noted to be high at primary care doctor's office blood pressure is acceptable here amlodipine is 5 mg daily  She will monitor her blood pressure  Dietary changes will be advised  I        4  History of mitral valve disorder  No evidence of any significant mitral valve disease  Echo reviewed      5  History of dyslipidemia  Continue statin she is on Lipitor 10 mg      6  History of impaired glucose metabolism his blood glucose 102  Lytes recent labs from  reviewed  7  Class 1 obesity with BMI 31 strongly advised to lose weight  Continue current Rx  Check flecainide level  She will monitor her blood pressure dietary changes advised cut back on salt try to lose weight  If blood pressure goes up we may need to increase amlodipine  Thank you for your consultation    If he have any question please call me at [phone]    Counseling :  A description of the counseling  Goals and Barriers  Patient's ability to self care: Yes  Medication side effect reviewed with patient in detail and all their questions answered to their satisfaction  Primary Care Physician: Ifrah Denny DO      HPI :     Leoncio Hernandez is a 75y o  year old female who   Self-referred as she was in [facility] in the hospital with atrial fibrillation with rapid ventricular rate  Patient was at a function in 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  As per patient she has a KATT guided cardioversion and she was given IV diuretics as well as Cardizem and started on Eliquis  She flew back Sunday and she has been doing well since yesterday she started feeling little lightheaded and fatigue and tired and today she came to see us in she is found to be in AFib with heart rate around 124 beats per minute  Unfortunately she has to fly tomorrow afternoon for her daughter's wedding to [place] but she is in atrial fibrillation  She is little upset with whole situation  No nausea no vomiting no PND no orthopnea  She has echo and KATT done but no stress test was done  We do not have those records available  She did bring some records from the hospital which includes labs her TSH was acceptable  Her liver enzymes were elevated  And she had probably pleural effusion as mentioned in the discharge recommendation to her  She is not taking any diuretic at this time  08/05/2022  Above reviewed  Patient came for follow-up she is doing well  She has no new symptoms but few weeks ago she may have brief episode of atrial fibrillation  She is very anxious and busy  But today she has sinus rhythm heart rate 63 beats per minute    She has medical history significant for paroxysmal atrial fibrillation currently suppressed with flecainide, dyslipidemia on statin, possible sick sinus syndrome  No chest pain she does get exertional shortness of breath which is not changed  He is also compliant with her statins  Her flecainide level in July 21 was 0 49  Review of Systems   Constitutional: Negative for activity change, chills, diaphoresis, fever and unexpected weight change  HENT: Negative for congestion  Eyes: Negative for discharge and redness  Respiratory: Negative for cough, chest tightness, shortness of breath and wheezing  Cardiovascular: Negative  Negative for chest pain, palpitations and leg swelling  One brief through  Of atrial fibrillation   Gastrointestinal: Negative for abdominal pain, diarrhea and nausea  Endocrine: Negative  Genitourinary: Negative for decreased urine volume and urgency  Musculoskeletal: Negative  Negative for arthralgias, back pain and gait problem  Skin: Negative for rash and wound  Allergic/Immunologic: Negative  Neurological: Negative for dizziness, seizures, syncope, weakness, light-headedness and headaches  Hematological: Negative  Psychiatric/Behavioral: Negative for agitation and confusion  The patient is not nervous/anxious          Historical Information   Past Medical History:   Diagnosis Date    Atrial fibrillation ([address] )     Hypertension     Mitral valve prolapse     Obesity     last assessed 4/27/2017     Past Surgical History:   Procedure Laterality Date    CATARACT EXTRACTION      B/L    HEMORRHOID SURGERY       Social History     Substance and Sexual Activity   Alcohol Use Yes    Alcohol/week: 3 0 standard drinks    Types: 3 Glasses of wine per week     Social History     Substance and Sexual Activity   Drug Use Never     Social History     Tobacco Use   Smoking Status Former Smoker    Types: Cigarettes   Smokeless Tobacco Never Used     Family History:   Family History   Problem Relation Age of Onset    Hyperlipidemia Mother     Hypertension Mother     No Known Problems Father     No Known Problems Sister     No Known Problems Daughter     No Known Problems Maternal Grandmother     No Known Problems Paternal Grandmother     No Known Problems Sister     No Known Problems Daughter     Pancreatic cancer Maternal Aunt 76    No Known Problems Maternal Aunt     No Known Problems Maternal Aunt     No Known Problems Maternal Aunt     No Known Problems Paternal Aunt     No Known Problems Paternal Aunt     No Known Problems Paternal Aunt        Meds/Allergies     No Known Allergies    Current Outpatient Medications:     amLODIPine (NORVASC) 5 mg tablet, Take 1 tablet (5 mg total) by mouth daily, Disp: 90 tablet, Rfl: 1    atorvastatin (LIPITOR) 10 mg tablet, Take 1 tablet (10 mg total) by mouth daily, Disp: 90 tablet, Rfl: 1    Eliquis 5 MG, Take 1 tablet (5 mg total) by mouth 2 (two) times a day, Disp: 180 tablet, Rfl: 3    flecainide (TAMBOCOR) 100 mg tablet, Take 1 tablet (100 mg total) by mouth 2 (two) times a day, Disp: 180 tablet, Rfl: 3    metoprolol tartrate (LOPRESSOR) 25 mg tablet, TAKE 1 TABLET EVERY 12 HOURS, Disp: 180 tablet, Rfl: 3    amoxicillin (AMOXIL) 500 mg capsule, Before dental appointments (Patient not taking: No sig reported), Disp: , Rfl:     Vitals: Blood pressure 120/82, pulse 63, temperature (!) 97 °F (36 1 °C), height 5' 6" (1 676 m), weight 89 8 kg (198 lb), SpO2 97 %  ?  Body mass index is 31 96 kg/m²  Vitals:    08/05/22 1511   Weight: 89 8 kg (198 lb)     BP Readings from Last 3 Encounters:   08/05/22 120/82   06/20/22 156/78   10/01/21 140/86         Physical Exam  Constitutional:       General: She is not in acute distress  Appearance: She is well-developed  She is not diaphoretic  Neck:      Thyroid: No thyromegaly  Vascular: No JVD  Trachea: No tracheal deviation     Cardiovascular: Rate and Rhythm: Normal rate and regular rhythm  Heart sounds: S1 normal and S2 normal  Heart sounds not distant  Murmur heard  Systolic (ejection) murmur is present with a grade of 2/6  No friction rub  No gallop  No S3 or S4 sounds  Pulmonary:      Effort: Pulmonary effort is normal  No respiratory distress  Breath sounds: Normal breath sounds  No wheezing or rales  Chest:      Chest wall: No tenderness  Abdominal:      General: Bowel sounds are normal  There is no distension  Palpations: Abdomen is soft  Tenderness: There is no abdominal tenderness  Musculoskeletal:         General: No deformity  Cervical back: Neck supple  Skin:     General: Skin is warm and dry  Coloration: Skin is not pale  Findings: No rash  Neurological:      Mental Status: She is alert and oriented to person, place, and time  Psychiatric:         Behavior: Behavior normal          Judgment: Judgment normal            Diagnostic Studies Review Cardio:  Echo stress test reviewed    EKG:      Twelve lead EKG done in our office on 04/23/2019 shows atrial fibrillation with rapid ventricular rate heart rate 124 beats per minute  Twelve lead EKG done in our office 05/17/2019 shows normal sinus rhythm heart rate 58 beats per minute  Left atrial enlargement  Incomplete RBBB  As compared to old EKG patient is now in sinus rhythm  Twelve lead EKG done 09/24/2019 shows normal sinus rhythm RSR pattern heart rate 78 beats per minute no significant change from old EKG patient is staying in sinus rhythm  Twelve lead EKG shows atrial fibrillation with heart rate 139 beats per minute  As compared to previous EKG she is now in atrial fibrillation  Twelve lead EKG 03/22/2021 shows sinus rhythm heart rate 59 beats per minute incomplete RBBB  No change from previous EKG QS in V1 to V3 most likely due to lead location       12 lead EKG done 10/01/2021 shows normal sinus rhythm heart rate 54 beats per minute  Incomplete RBBB  no change from previous EKG  Twelve lead EKG 08/05/2022 shows sinus rhythm  milliseconds  Left axis deviation  Incomplete RBBB note significant change from previous EKG  Heart rate is 63 beats per minute  Lab Review     BMP:  Lab Results   Component Value Date    SODIUM 142 06/14/2022    K 4 4 06/14/2022     06/14/2022    CO2 26 06/14/2022    BUN 14 06/14/2022    CREATININE 0 90 06/14/2022    GLUC 102 (H) 06/14/2022    CALCIUM 9 0 01/20/2020    EGFR 70 06/14/2022     LFT:  Lab Results   Component Value Date    AST 20 06/14/2022    ALT 21 06/14/2022    ALKPHOS 90 01/20/2020    TP 6 6 06/14/2022    ALB 4 5 06/14/2022      No results found for: [facility]  Lab Results   Component Value Date    HGBA1C 5 4 07/29/2021     Lipid Profile:   Lab Results   Component Value Date    CHOLESTEROL 205 (H) 07/29/2021    HDL 54 07/29/2021    LDLCALC 131 (H) 07/29/2021    TRIG 113 07/29/2021     Lab Results   Component Value Date    CHOLESTEROL 205 (H) 07/29/2021    CHOLESTEROL 186 09/03/2020       Flecainide level in July 2021 0 49    Dr Hope Ontiveros MD [facility]      "This note has been constructed using a voice recognition system  Therefore there may be syntax, spelling, and/or grammatical errors   Please call if you have any questions  "

## 2022-08-05 NOTE — PATIENT INSTRUCTIONS
Chronic Hypertension   WHAT YOU NEED TO KNOW:   Hypertension is high blood pressure  Your blood pressure is the force of your blood moving against the walls of your arteries  Hypertension causes your blood pressure to get so high that your heart has to work much harder than normal  This can damage your heart  Even if you have hypertension for years, lifestyle changes, medicines, or both can help bring your blood pressure to normal   DISCHARGE INSTRUCTIONS:   Call your local emergency number (911 in the [address] [facility]) or have someone call if:   You have chest pain  You have any of the following signs of a heart attack:      Squeezing, pressure, or pain in your chest    You may  also have any of the following:     Discomfort or pain in your back, neck, jaw, stomach, or arm    Shortness of breath    Nausea or vomiting    Lightheadedness or a sudden cold sweat    You become confused or have difficulty speaking  You suddenly feel lightheaded or have trouble breathing  Return to the emergency department if:   You have a severe headache or vision loss  You have weakness in an arm or leg  Call your doctor or cardiologist if:   You feel faint, dizzy, confused, or drowsy  You have been taking your blood pressure medicine but your pressure is higher than your provider says it should be  You have questions or concerns about your condition or care  Medicines: You may need any of the following:  Antihypertensives  may be used to help lower your blood pressure  Several kinds of medicines are available  Your healthcare provider may change the medicine or medicines you currently take  This may be needed if your blood pressure is often high when you check it at home or you are having other problems with blood pressure control  Diuretics  help decrease extra fluid that collects in your body  This will help lower your BP  You may urinate more often while you take this medicine      Cholesterol medicine  helps lower your cholesterol level  A low cholesterol level helps prevent heart disease and makes it easier to control your blood pressure  Take your medicine as directed  Contact your healthcare provider if you think your medicine is not helping or if you have side effects  Tell him or her if you are allergic to any medicine  Keep a list of the medicines, vitamins, and herbs you take  Include the amounts, and when and why you take them  Bring the list or the pill bottles to follow-up visits  Carry your medicine list with you in case of an emergency  Stages of hypertension:       Normal blood pressure is 119/79 or lower   Your healthcare provider may only check your blood pressure each year if it stays at a normal level  Elevated blood pressure is 120/79 to 129/79   This is sometimes called prehypertension  Your healthcare provider may suggest lifestyle changes to help lower your blood pressure to a normal level  He or she may then check it again in 3 to 6 months  Stage 1 hypertension is 130/80  to 139/89   Your provider may recommend lifestyle changes, medication, and checks every 3 to 6 months until your blood pressure is controlled  Stage 2 hypertension is 140/90 or higher   Your provider will recommend lifestyle changes and have you take 2 kinds of hypertension medicines  You will also need to have your blood pressure checked monthly until it is controlled  Manage chronic hypertension:   Check your blood pressure at home  Avoid smoking, caffeine, and exercise at least 30 minutes before checking your blood pressure  Sit and rest for 5 minutes before you take your blood pressure  Extend your arm and support it on a flat surface  Your arm should be at the same level as your heart  Follow the directions that came with your blood pressure monitor  Check your blood pressure 2 times, 1 minute apart, before you take your medicine in the morning   Also check your blood pressure before your evening meal  Keep a record of your readings and bring it to your follow-up visits  Ask your healthcare provider what your blood pressure should be  Manage any other health conditions you have  Health conditions such as diabetes can increase your risk for hypertension  Follow your healthcare provider's instructions and take all your medicines as directed  Talk to your healthcare provider about any new health conditions you have recently developed  Ask about all medicines  Certain medicines can increase your blood pressure  Examples include oral birth control pills, decongestants, herbal supplements, and NSAIDs, such as ibuprofen  Your healthcare provider can tell you which medicines are safe for you to take  This includes prescription and over-the-counter medicines  Lifestyle changes you can make to lower your blood pressure: Your provider may want you to make more lifestyle changes if you are having trouble controlling your blood pressure  This may feel difficult over time, especially if you think you are making good changes but your pressure is still high  It might help to focus on one new change at a time  For example, try to add 1 more day of exercise, or exercise for an extra 10 minutes on 2 days  Small changes can make a big difference  Your healthcare provider can also refer you to specialists such as a dietitian who can help you make small changes  Your family members may be included in helping you learn to create lifestyle changes, such as the following:     Limit sodium (salt) as directed  Too much sodium can affect your fluid balance  Check labels to find low-sodium or no-salt-added foods  Some low-sodium foods use potassium salts for flavor  Too much potassium can also cause health problems  Your healthcare provider will tell you how much sodium and potassium are safe for you to have in a day  He or she may recommend that you limit sodium to 2,300 mg a day           Follow the meal plan recommended by your healthcare provider  A dietitian or your provider can give you more information on low-sodium plans or the DASH (Dietary Approaches to Stop Hypertension) eating plan  The DASH plan is low in sodium, processed sugar, unhealthy fats, and total fat  It is high in potassium, calcium, and fiber  These can be found in vegetables, fruit, and whole-grain foods  Be physically active throughout the day  Physical activity, such as exercise, can help control your blood pressure and your weight  Be physically active for at least 30 minutes per day, on most days of the week  Include aerobic activity, such as walking or riding a bicycle  Also include strength training at least 2 times each week  Your healthcare providers can help you create a physical activity plan  Decrease stress  This may help lower your blood pressure  Learn ways to relax, such as deep breathing or listening to music  Limit alcohol as directed  Alcohol can increase your blood pressure  A drink of alcohol is 12 ounces of beer, 5 ounces of wine, or 1½ ounces of liquor  Do not smoke  Nicotine and other chemicals in cigarettes and cigars can increase your blood pressure and also cause lung damage  Ask your healthcare provider for information if you currently smoke and need help to quit  E-cigarettes or smokeless tobacco still contain nicotine  Talk to your healthcare provider before you use these products  Follow up with your doctor or cardiologist as directed: You will need to return to have your blood pressure checked and to have other lab tests done  Write down your questions so you remember to ask them during your visits  © [employer] 2022 Information is for End User's use only and may not be sold, redistributed or otherwise used for commercial purposes   All illustrations and images included in CareNotes® are the copyrighted property of [employer]  or Billy Palacio  The above information is an  only  It is not intended as medical advice for individual conditions or treatments  Talk to your doctor, nurse or pharmacist before following any medical regimen to see if it is safe and effective for you  DASH Eating Plan   AMBULATORY CARE:   The DASH (Dietary Approaches to Stop Hypertension) Eating Plan  is designed to help prevent or lower high blood pressure  It can also help to lower LDL (bad) cholesterol and decrease your risk for heart disease  The plan is low in sodium, sugar, unhealthy fats, and total fat  It is high in potassium, calcium, magnesium, and fiber  These nutrients are added when you eat more fruits, vegetables, and whole grains  With the DASH eating plan, you need to eat a certain number of servings from each food group  This will help you get enough of certain nutrients and limit others  The amount of servings you should eat depends on how many calories you need  Your dietitian can help you create meal plans with the right number of servings for each food group  What you need to know about sodium:  Your dietitian will tell you how much sodium is safe for you to have each day  People with high blood pressure should have no more than 1,500 to 2,300 mg of sodium in a day  A teaspoon (tsp) of salt has 2,300 mg of sodium  This may seem like a difficult goal, but small changes to the foods you eat can make a big difference  Your healthcare provider or dietitian can help you create a meal plan that follows your sodium limit  Read food labels  Food labels can help you choose foods that are low in sodium  The amount of sodium is listed in milligrams (mg)  The % Daily Value (DV) column tells you how much of your daily needs are met by 1 serving of the food for each nutrient listed  Choose foods that have less than 5% of the DV of sodium  These foods are considered low in sodium  Foods that have 20% or more of the DV of sodium are considered high in sodium   Avoid foods that have more than 300 mg of sodium in each serving  Choose foods that say low-sodium, reduced-sodium, or no salt added on the food label  Limit added salt  Do not salt food at the table if you add salt when you cook  Use herbs and spices, such as onions, garlic, and salt-free seasonings to add flavor  Try lemon or lime juice or vinegar to add a tart flavor  Use hot peppers or a small amount of hot pepper sauce to add a spicy flavor  Limit foods high in added salt, such as the following:    Seasonings made with salt, such as garlic salt, celery salt, onion salt, seasoned salt, meat tenderizers, and monosodium glutamate (MSG)    Miso soup and canned or dried soup mixes    Regular soy sauce, barbecue sauce, teriyaki sauce, steak sauce, Worcestershire sauce, and most flavored vinegars    Snack foods, such as salted chips, popcorn, pretzels, pork rinds, salted crackers, and salted nuts    Frozen foods, such as dinners, entrees, vegetables with sauces, and breaded meats    Ask about salt substitutes  Ask your healthcare provider if you may use salt substitutes  Some salt substitutes have ingredients that can be harmful if you have certain health conditions  Choose foods carefully at restaurants  Meals from restaurants, especially fast food restaurants, are often high in sodium  Some restaurants have nutrition information that tells you the amount of sodium in their foods  Ask to have your food prepared with less, or no salt  What you need to know about fats:  Healthy fats include unsaturated fats and omega-3 fatty acids  Unhealthy fats include saturated fats and trans fats    Include healthy fats, such as the following:      Cooking oils, such as soybean, canola, olive, or sunflower    Fatty fish, such as salmon, tuna, mackerel, or sardines    Flaxseed oil or ground flaxseed    ½ cup of cooked beans, such as black beans, kidney beans, or kelly beans    1½ ounces of low-sodium nuts, such as almonds or walnuts    Low-sugar, low-sodium peanut butter    Seeds such as magalie seeds or sunflower seeds       Limit or do not have unhealthy fats, such as the following:      Foods that contain fat from animals, such as fatty meats, whole milk, butter, and cream    Shortening, stick margarine, palm oil, and coconut oil    Full-fat or creamy salad dressing    Creamy soup    Crackers, chips, and baked goods made with margarine or shortening    Foods that are fried in unhealthy fats    Gravy and sauces, such as Castro or cheese sauces    What you need to know about carbohydrates (carbs): All carbs break down into sugar  Complex carbs contain more fiber than simple carbs  This means complex carbs go into the bloodstream more slowly and cause less of a blood sugar spike  Try to include more complex carbs and fewer simple carbs  Include complex carbs, such as the followin slice of whole-grain bread    1 ounce of dry cereal that does not contain added sugar    ½ cup of cooked oatmeal    2 ounces of cooked whole-grain pasta    ½ cup of cooked brown rice    Limit or do not have simple carbs, such as the following:      [employer], such as doughnuts, pastries, and cookies    Mixes for cornbread and biscuits    White rice and pasta mixes, such as boxed macaroni and cheese    Instant and cold cereals that contain sugar    Jelly, jam, and ice cream that contain sugar    Condiments such as ketchup    Drinks high in sugar, such as soft drinks, lemonade, and fruit juice    What you need to know about vegetables and fruits:  Vegetables and fruits can be fresh, frozen, or canned  If possible, try to choose low-sodium canned options    Include a variety of vegetables and fruits, such as the followin medium apple, pear, or peach (about ½ cup chopped)    ½ small banana    ½ cup berries, such as blueberries, strawberries, or blackberries    1 cup of raw leafy greens, such as lettuce, spinach, kale, or stevan greens    ½ cup of frozen or canned (no added salt) vegetables, such as green beans    ½ cup of fresh, frozen, or canned fruit (canned in light syrup or fruit juice)    ½ cup of vegetable or fruit juice    Limit or do not have vegetables and fruits made in the following ways:      Frozen fruit such as cherries that have added sugar    Fruit in cream or butter sauce    Canned vegetables that are high in sodium    Sauerkraut, pickled vegetables, and other foods prepared in brine    Fried vegetables or vegetables in butter or high-fat sauces    What you need to know about protein foods: Include lean or low-fat protein foods, such as the following:      Poultry (chicken, turkey) with no skin    Fish (especially fatty fish, such as salmon, fresh tuna, or mackerel)    Lean beef and pork (loin, round, extra lean hamburger)    Egg whites and egg substitutes    1 cup of nonfat (skim) or 1% milk    1½ ounces of fat-free or low-fat cheese    6 ounces of nonfat or low-fat yogurt    Limit or do not have high-fat protein foods, such as the following:      Smoked or cured meat, such as corned beef, hill, ham, hot dogs, and sausage    Canned beans and canned meats or spreads, such as potted meats, sardines, anchovies, and imitation seafood    Deli or lunch meats, such as bologna, ham, turkey, and roast beef    High-fat meat (T-bone steak, regular hamburger, and ribs)    Whole eggs and egg yolks    Whole milk, 2% milk, and cream    Regular cheese and processed cheese    Other guidelines to follow:   Maintain a healthy weight  Your risk for heart disease is higher if you are overweight  Your healthcare provider may suggest that you lose weight if you are overweight  You can lose weight by eating fewer calories and foods that have added sugars and fat  The DASH meal plan can help you do this  Decrease calories by eating smaller portions at each meal and fewer snacks   Ask your healthcare provider for more information about how to lose weight  Exercise regularly  Regular exercise can help you reach or maintain a healthy weight  Regular exercise can also help decrease your blood pressure and improve your cholesterol levels  Get 30 minutes or more of moderate exercise each day of the week  To lose weight, get at least 60 minutes of exercise  Talk to your healthcare provider about the best exercise program for you  Limit alcohol  Women should limit alcohol to 1 drink a day  Men should limit alcohol to 2 drinks a day  A drink of alcohol is 12 ounces of beer, 5 ounces of wine, or 1½ ounces of liquor  For more information:   [facility] [address]  [address] [address]  [place] [address]  Phone: [phone] - [phone]  Web Address: [facility] no    © 0011 [facility] 2022 Information is for End User's use only and may not be sold, redistributed or otherwise used for commercial purposes  All illustrations and images included in CareNotes® are the copyrighted property of [employer]  or [facility] Carmita Clancy   The above information is an  only  It is not intended as medical advice for individual conditions or treatments  Talk to your doctor, nurse or pharmacist before following any medical regimen to see if it is safe and effective for you

## 2022-09-20 ENCOUNTER — APPOINTMENT (OUTPATIENT)
Dept: RADIOLOGY | Facility: CLINIC | Age: 67
End: 2022-09-20
Payer: COMMERCIAL

## 2022-09-20 ENCOUNTER — OFFICE VISIT (OUTPATIENT)
Dept: OBGYN CLINIC | Facility: CLINIC | Age: 67
End: 2022-09-20
Payer: COMMERCIAL

## 2022-09-20 VITALS — TEMPERATURE: 98.6 F | HEART RATE: 61 BPM | SYSTOLIC BLOOD PRESSURE: 167 MMHG | DIASTOLIC BLOOD PRESSURE: 78 MMHG

## 2022-09-20 DIAGNOSIS — M25.562 ACUTE PAIN OF LEFT KNEE: ICD-10-CM

## 2022-09-20 DIAGNOSIS — M17.12 PRIMARY OSTEOARTHRITIS OF LEFT KNEE: ICD-10-CM

## 2022-09-20 DIAGNOSIS — M25.562 ACUTE PAIN OF LEFT KNEE: Primary | ICD-10-CM

## 2022-09-20 PROCEDURE — 20610 DRAIN/INJ JOINT/BURSA W/O US: CPT | Performed by: PHYSICIAN ASSISTANT

## 2022-09-20 PROCEDURE — 3077F SYST BP >= 140 MM HG: CPT | Performed by: PHYSICIAN ASSISTANT

## 2022-09-20 PROCEDURE — 3078F DIAST BP <80 MM HG: CPT | Performed by: PHYSICIAN ASSISTANT

## 2022-09-20 PROCEDURE — 73562 X-RAY EXAM OF KNEE 3: CPT

## 2022-09-20 PROCEDURE — 99204 OFFICE O/P NEW MOD 45 MIN: CPT | Performed by: PHYSICIAN ASSISTANT

## 2022-09-20 RX ORDER — TRIAMCINOLONE ACETONIDE 40 MG/ML
80 INJECTION, SUSPENSION INTRA-ARTICULAR; INTRAMUSCULAR
Status: COMPLETED | OUTPATIENT
Start: 2022-09-20 | End: 2022-09-20

## 2022-09-20 RX ORDER — BUPIVACAINE HYDROCHLORIDE 2.5 MG/ML
2 INJECTION, SOLUTION INFILTRATION; PERINEURAL
Status: COMPLETED | OUTPATIENT
Start: 2022-09-20 | End: 2022-09-20

## 2022-09-20 RX ADMIN — BUPIVACAINE HYDROCHLORIDE 2 ML: 2.5 INJECTION, SOLUTION INFILTRATION; PERINEURAL at 13:47

## 2022-09-20 RX ADMIN — TRIAMCINOLONE ACETONIDE 80 MG: 40 INJECTION, SUSPENSION INTRA-ARTICULAR; INTRAMUSCULAR at 13:47

## 2022-09-20 NOTE — PROGRESS NOTES
Assessment/Plan:  1  Acute pain of left knee  XR knee 3 vw left non injury    Ambulatory Referral to Physical Therapy   2  Primary osteoarthritis of left knee  Ambulatory Referral to Physical Therapy     Paco Parikh has had an acute flare of her medial joint space osteoarthritis  Treatment options were reviewed with the patient in the office including risks and benefits of a cortisone injection  She wishes to proceed with the cortisone injection today in combination with formal physical therapy  I have recommended discontinuing the use of the ibuprofen due to her concurrent Eliquis usage  We discussed this at length  She will ice the knee for comfort 20 minutes on 1 hour off 3 times a day  She may continue the brace  We discussed Visco injections as a potential future treatment option  She will follow-up in 4-6 weeks with Dr David Yanez for the left knee osteoarthritis  Subjective:   Patient ID: Josefa Aguilera is a 75 y o  female this is a new patient with left knee pain  She reports that the knee began bothering her for approximately 5 or 6 weeks  She began to notice an ache in the medial knee 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  She denies any falls or trauma  Since that time she has reported a dull ache in the medial aspect of her knee that really bothers her at night  She reports she has tried an over-the-counter brace, ice, and 600 mg of ibuprofen 3 times a day which has upset her stomach  She denies problems like this in the past   She denies any calf pain or paresthesias  She has a contributing medical history of AFib and is on chronic blood thinners  Review of Systems   Constitutional: Negative for chills and fever  HENT: Negative for ear pain and sore throat  Eyes: Negative for pain and visual disturbance  Respiratory: Negative for cough and shortness of breath  Cardiovascular: Negative for chest pain and palpitations     Gastrointestinal: Negative for abdominal pain and vomiting  Genitourinary: Negative for dysuria and hematuria  Musculoskeletal: Negative for arthralgias and back pain  Skin: Negative for color change and rash  Neurological: Negative for seizures and syncope  All other systems reviewed and are negative  Past Medical History:   Diagnosis Date    Atrial fibrillation ([address] )     Hypertension     Mitral valve prolapse     Obesity     last assessed 4/27/2017       Past Surgical History:   Procedure Laterality Date    CATARACT EXTRACTION      B/L    HEMORRHOID SURGERY         Family History   Problem Relation Age of Onset    Hyperlipidemia Mother     Hypertension Mother     No Known Problems Father     No Known Problems Sister     No Known Problems Daughter     No Known Problems Maternal Grandmother     No Known Problems Paternal Grandmother     No Known Problems Sister     No Known Problems Daughter     Pancreatic cancer Maternal Aunt 76    No Known Problems Maternal Aunt     No Known Problems Maternal Aunt     No Known Problems Maternal Aunt     No Known Problems Paternal Aunt     No Known Problems Paternal Aunt     No Known Problems Paternal Aunt        Social History     Occupational History    Not on file   Tobacco Use    Smoking status: Former Smoker     Types: Cigarettes    Smokeless tobacco: Never Used   Substance and Sexual Activity    Alcohol use:  Yes     Alcohol/week: 3 0 standard drinks     Types: 3 Glasses of wine per week    Drug use: Never    Sexual activity: Not on file         Current Outpatient Medications:     amLODIPine (NORVASC) 5 mg tablet, Take 1 tablet (5 mg total) by mouth daily, Disp: 90 tablet, Rfl: 1    amoxicillin (AMOXIL) 500 mg capsule, Before dental appointments (Patient not taking: No sig reported), Disp: , Rfl:     atorvastatin (LIPITOR) 10 mg tablet, Take 1 tablet (10 mg total) by mouth daily, Disp: 90 tablet, Rfl: 1    Eliquis 5 MG, Take 1 tablet (5 mg total) by mouth 2 (two) times a day, Disp: 180 tablet, Rfl: 3    flecainide (TAMBOCOR) 100 mg tablet, Take 1 tablet (100 mg total) by mouth 2 (two) times a day, Disp: 180 tablet, Rfl: 3    metoprolol tartrate (LOPRESSOR) 25 mg tablet, TAKE 1 TABLET EVERY 12 HOURS, Disp: 180 tablet, Rfl: 3    No Known Allergies    Objective:  Vitals:    09/20/22 1323   BP: 167/78   Pulse: 61   Temp: 98 6 °F (37 °C)       Ortho Exam    Physical Exam  Constitutional:       General: She is not in acute distress  Appearance: Normal appearance  HENT:      Head: Normocephalic and atraumatic  Nose: Nose normal    Cardiovascular:      Pulses: Normal pulses  Skin:     General: Skin is warm and dry  Coloration: Skin is not jaundiced  Findings: No bruising, erythema or rash  Neurological:      General: No focal deficit present  Mental Status: She is alert and oriented to person, place, and time  Psychiatric:         Mood and Affect: Mood normal          Behavior: Behavior normal          Thought Content: Thought content normal          Judgment: Judgment normal      Patient's left knee is without skin breakdown lesions or signs of infection  She has no intra-articular effusion  She is tender on the medial joint line  She is nontender on the lateral joint line  She has no pain or instability to valgus and varus stressing  She has a negative medial and lateral Loraine's  She has no instability to Lachman's and posterior drawer  She has no calf tenderness and a negative Homans  She has a negative straight leg raise  Quad and hamstring strength are 5- out of 5  She is nontender to palpation on the mediolateral border of the patella  There is no significant retropatellar crepitus  Her gait is heel-to-toe  Large joint arthrocentesis: L knee  Universal Protocol:  Consent: Verbal consent obtained    Risks and benefits: risks, benefits and alternatives were discussed  Consent given by: patient  Time out: Immediately prior to procedure a "time out" was called to verify the correct patient, procedure, equipment, support staff and site/side marked as required  Patient understanding: patient states understanding of the procedure being performed  Patient consent: the patient's understanding of the procedure matches consent given  Relevant documents: relevant documents present and verified  Test results: test results available and properly labeled  Site marked: the operative site was marked  Radiology Images displayed and confirmed  If images not available, report reviewed: imaging studies available  Patient identity confirmed: verbally with patient    Supporting Documentation  Indications: pain   Procedure Details  Location: knee - L knee  Preparation: Patient was prepped and draped in the usual sterile fashion  Needle size: 22 G  Ultrasound guidance: no  Approach: anterolateral  Medications administered: 2 mL bupivacaine 0 25 %; 80 mg triamcinolone acetonide 40 mg/mL    Patient tolerance: patient tolerated the procedure well with no immediate complications  Dressing:  Sterile dressing applied        I have personally reviewed pertinent films in PACS and my interpretation is three views of the left knee performed in the office do show medial joint space narrowing without bone-on-bone deformity  There is medial tibial plateau osteophyte formation  No acute fracture dislocations are seen  Lateral joint space and patellofemoral space are maintained    This was read from an orthopedic standpoint will await official radiologist interpretation

## 2022-09-20 NOTE — PATIENT INSTRUCTIONS
Hyaluronic Acid (By injection)   Hyaluronic Acid (bfw-io-xel-ON-ate AS-id)  Treats severe pain in your knee due to osteoarthritis  Brand Name(s): Durolane, Euflexxa, Gel-One, GelSyn-3, GenVisc 850, Hyalgan, Hymovis, Monovisc, Orthovisc, Supartz FX, TriLURON, TriVisc, Visco-3   There may be other brand names for this medicine  When This Medicine Should Not Be Used: This medicine is not right for everyone  You should not receive it if you had an allergic reaction to hyaluronic acid or if you have a bleeding disorder  How to Use This Medicine:   Injectable  Your doctor will tell you how many injections you will need  This medicine is injected into your knee joint  A nurse or other health provider will give you this medicine  Drugs and Foods to Avoid:      Ask your doctor or pharmacist before using any other medicine, including over-the-counter medicines, vitamins, and herbal products  Warnings While Using This Medicine:   Tell your doctor if you are pregnant or breastfeeding, or if you have any allergies, including to birds, feathers, or eggs  Rest your knee for 48 hours after you receive an injection  Do not do strenuous, weightbearing activities, such as jogging or tennis  Avoid activities that keep you standing for longer than 1 hour  Possible Side Effects While Using This Medicine:   Call your doctor right away if you notice any of these side effects: Allergic reaction: Itching or hives, swelling in your face or hands, swelling or tingling in your mouth or throat, chest tightness, trouble breathing  If you notice these less serious side effects, talk with your doctor:   Mild increase in pain or swelling in your knee  Pain, redness, or swelling where the medicine is injected  If you notice other side effects that you think are caused by this medicine, tell your doctor  Call your doctor for medical advice about side effects   You may report side effects to FDA at 6-436-FDA-4923    © Copyright [employer] 2022 Information is for [employer] use only and may not be sold, redistributed or otherwise used for commercial purposes  The above information is an  only  It is not intended as medical advice for individual conditions or treatments  Talk to your doctor, nurse or pharmacist before following any medical regimen to see if it is safe and effective for you

## 2022-09-27 ENCOUNTER — EVALUATION (OUTPATIENT)
Dept: PHYSICAL THERAPY | Facility: CLINIC | Age: 67
End: 2022-09-27
Payer: COMMERCIAL

## 2022-09-27 DIAGNOSIS — M25.562 ACUTE PAIN OF LEFT KNEE: ICD-10-CM

## 2022-09-27 DIAGNOSIS — M17.12 PRIMARY OSTEOARTHRITIS OF LEFT KNEE: Primary | ICD-10-CM

## 2022-09-27 PROCEDURE — 97162 PT EVAL MOD COMPLEX 30 MIN: CPT

## 2022-09-27 PROCEDURE — 97110 THERAPEUTIC EXERCISES: CPT

## 2022-09-27 NOTE — PROGRESS NOTES
PT Evaluation     Today's date: 2022  Patient name: Teja Tirado  : 1955  MRN: [id number]  Referring provider: Ralph Snellen, PA*  Dx:   Encounter Diagnosis     ICD-10-CM    1  Acute pain of left knee  M25 562 Ambulatory Referral to Physical Therapy   2  Primary osteoarthritis of left knee  M17 12 Ambulatory Referral to Physical Therapy                  Assessment  Assessment details: Teja Tirado is a 75 y o  female who presents with pain, decreased strength, decreased ROM, ambulatory dysfunction, postural dysfunction and balance dysfunction  Due to these impairments, patient has difficulty performing ADL's, recreational activities, work-related activities, ambulation, stair negotiation, lifting/carrying  Patient's clinical presentation is consistent with their referring diagnosis of Acute pain of left knee  Plan: Ambulatory Referral to Physical Therapy    Primary osteoarthritis of left knee  Plan: Ambulatory Referral to Physical Therapy    Patient has been educated in home exercise program and plan of care   Patient would benefit from skilled physical therapy services to address their aforementioned functional limitations and progress towards prior level of function and independence with home exercise program      Impairments: abnormal gait, abnormal or restricted ROM, activity intolerance, impaired physical strength, lacks appropriate home exercise program, pain with function, weight-bearing intolerance, poor posture  and poor body mechanics    Goals  Short Term Goals to be accomplished in 3 weeks:  STG1: Pt will be I with HEP  STG2: Pt will demo 50% inc in knee AROM  STG3: Pt will demo 4+  MMT strength in knee   STG4: Pt will amb community distance without gait deviates due to pain     Long Term Goals to be accomplished in 6 weeks:   LTG1: Pt will demo knee strength WNL to return to PLOF  LTG2: Pt will demo knee AROM WNL to minimize gait deviations  LTG3: Pt will return to household ADLs and work duties pain free as per [employer]  Plan details:  HEP development, stretching, strengthening, A/AA/PROM, joint mobilizations, posture education, STM/MI as needed to reduce muscle tension, muscle reeducation,Patient has been educated in Dx, prognosis and plan of care and is in agreement  Patient would benefit from: PT eval and skilled physical therapy  Planned modality interventions: cryotherapy and thermotherapy: hydrocollator packs  Planned therapy interventions: manual therapy, neuromuscular re-education, self care, therapeutic activities, therapeutic exercise and home exercise program  Frequency: 2x week  Duration in weeks: 6  Treatment plan discussed with: patient        Subjective Evaluation    History of Present Illness  Mechanism of injury: Pt reports that she has been dealing with pain in the left knee for a month  Pt reports that she has a hard time walking up/down steps, walking up and down steps, walking/standing for more then 15 min  Pt reports that she feels like her knee  "might burst" and complains of pressure, sharp pain, and pressure when navigating steps  She did get an injection on Friday however, states that pain has not improved     Pain  Current pain ratin  At best pain rating: 3  At worst pain ratin  Quality: dull ache, throbbing, tight, discomfort, pressure, sharp and knife-like  Relieving factors: ice, rest and support    Social Support  Steps to enter house: yes  Stairs in house: yes   Lives in: multiple-level home  Lives with: spouse    Employment status: not working  Hand dominance: right  Exercise history: walking 30 min daily     Treatments  Current treatment: physical therapy  Patient Goals  Patient goals for therapy: increased strength, independence with ADLs/IADLs, return to sport/leisure activities, increased motion and decreased pain          Objective     Active Range of Motion   Left Knee   Flexion: 55 degrees with pain  Extension: -10 degrees with pain    Right Knee   Normal active range of motion    Strength/Myotome Testing     Left Knee   Flexion: 3+  Prone flexion: 3-  Extension: 3+    Right Knee   Normal strength    Tests     Left Knee   Positive medial Loraine, patellar apprehension, patellar compression and patella-femoral grind  Negative lateral Loraine                Precautions: standard       Manuals 09/27/22                                                                Neuro Re-Ed             [employer] sets              TA iso                                                                  Ther Ex             Warm up nustep              LAQ/SAQ             SLR              Clamshells              Heel slides              PROM knee flex/ext                                        Ther Activity                                       Gait Training                                       Modalities

## 2022-09-28 DIAGNOSIS — I10 BENIGN ESSENTIAL HYPERTENSION: ICD-10-CM

## 2022-09-28 RX ORDER — AMLODIPINE BESYLATE 5 MG/1
TABLET ORAL
Qty: 90 TABLET | Refills: 1 | Status: SHIPPED | OUTPATIENT
Start: 2022-09-28

## 2022-09-29 ENCOUNTER — OFFICE VISIT (OUTPATIENT)
Dept: PHYSICAL THERAPY | Facility: CLINIC | Age: 67
End: 2022-09-29
Payer: COMMERCIAL

## 2022-09-29 DIAGNOSIS — M17.12 PRIMARY OSTEOARTHRITIS OF LEFT KNEE: ICD-10-CM

## 2022-09-29 DIAGNOSIS — M25.562 ACUTE PAIN OF LEFT KNEE: Primary | ICD-10-CM

## 2022-09-29 PROCEDURE — 97110 THERAPEUTIC EXERCISES: CPT

## 2022-09-29 PROCEDURE — 97112 NEUROMUSCULAR REEDUCATION: CPT

## 2022-09-29 PROCEDURE — 97140 MANUAL THERAPY 1/> REGIONS: CPT

## 2022-09-29 NOTE — PROGRESS NOTES
Daily Note     Today's date: 2022  Patient name: Gabino Jose  : 1955  MRN: [id number]  Referring provider: YUMIKO Villa*  Dx:   Encounter Diagnosis     ICD-10-CM    1  Acute pain of left knee  M25 562    2  Primary osteoarthritis of left knee  M17 12                   Subjective: Pt reports that her pain level is 4/10  Pain increases with weightbearing and when she is trying to straighten her knee  Objective: See treatment diary below      Assessment: Tolerated treatment well  Patient demonstrated fatigue post treatment, exhibited good technique with therapeutic exercises and would benefit from continued PT      Plan: Continue per plan of care        Precautions: standard       Manuals 22             STM hamstrings - IM              Patellafemoral sup/inf glides- im                                      Neuro Re-Ed             Quad set   X 20 5s hold            Glute sets   X 20 5s hold            TA iso   X 20 5s hold                                                                Ther Ex             Warm up nustep   NS lvl 1 5 min            LAQ/SAQ  SAQ x 10 5s hold            SLR              Clamshells              Heel slides   W/ sos x 15            PROM knee flex/ext   X 5 min                                      Ther Activity                                       Gait Training                                       Modalities

## 2022-10-04 ENCOUNTER — OFFICE VISIT (OUTPATIENT)
Dept: PHYSICAL THERAPY | Facility: CLINIC | Age: 67
End: 2022-10-04
Payer: COMMERCIAL

## 2022-10-04 DIAGNOSIS — M25.562 ACUTE PAIN OF LEFT KNEE: Primary | ICD-10-CM

## 2022-10-04 PROCEDURE — 97112 NEUROMUSCULAR REEDUCATION: CPT

## 2022-10-04 PROCEDURE — 97110 THERAPEUTIC EXERCISES: CPT

## 2022-10-04 PROCEDURE — 97140 MANUAL THERAPY 1/> REGIONS: CPT

## 2022-10-04 NOTE — PROGRESS NOTES
Daily Note     Today's date: 10/4/2022  Patient name: Raquel Flores  : 1955  MRN: [id number]  Referring provider: YUMIKO Camarena*  Dx:   Encounter Diagnosis     ICD-10-CM    1  Acute pain of left knee  M25 562                   Subjective: Pt reports that she is feeling much better and has noted improvements in symptoms  Today complains of no pain but does have some stiffness  Objective: See treatment diary below      Assessment: Tolerated treatment well  Patient demonstrated fatigue post treatment, exhibited good technique with therapeutic exercises and would benefit from continued PT      Plan: Continue per plan of care  POC expires Auth Status Total   Visits  Start date  Expiration date PT/OT + Visit Limit? Co-Insurance    No auth requried  22                                                Visit/Unit Tracking  AUTH Status: n/a Date 9/27 09/29 10/04            Visits  Authed: n/a Used 1 2 3             Remaining                      Precautions: standard       Manuals 09/27/22 09/29/22 10/04/22            STM hamstrings - IM  IM             Patellafemoral sup/inf glides- im  IM                                     Neuro Re-Ed             Quad set   X 20 5s hold  X 20 5s hold           Glute sets   X 20 5s hold  X 20 5s hold           TA iso   X 20 5s hold  X 20 5s hold                                                               Ther Ex             Warm up nustep   NS lvl 1 5 min  NS lvl 1 5 min           LAQ/SAQ  SAQ x 10 5s hold  saq x 15 5s hold           SLR              Clamshells              Heel slides   W/ sos x 15  W/ sos x 15          PROM knee flex/ext   X 5 min  X 5 min              Sit to stand x 5 cues req                          Ther Activity                                       Gait Training                                       Modalities

## 2022-10-06 ENCOUNTER — OFFICE VISIT (OUTPATIENT)
Dept: PHYSICAL THERAPY | Facility: CLINIC | Age: 67
End: 2022-10-06
Payer: COMMERCIAL

## 2022-10-06 DIAGNOSIS — M25.562 ACUTE PAIN OF LEFT KNEE: Primary | ICD-10-CM

## 2022-10-06 DIAGNOSIS — M17.12 PRIMARY OSTEOARTHRITIS OF LEFT KNEE: ICD-10-CM

## 2022-10-06 PROCEDURE — 97110 THERAPEUTIC EXERCISES: CPT

## 2022-10-06 PROCEDURE — 97140 MANUAL THERAPY 1/> REGIONS: CPT

## 2022-10-06 PROCEDURE — 97112 NEUROMUSCULAR REEDUCATION: CPT

## 2022-10-06 NOTE — PROGRESS NOTES
Daily Note     Today's date: 10/6/2022  Patient name: Leoncio Hernandez  : 1955  MRN: [id number]  Referring provider: YUMIKO Jang*  Dx:   Encounter Diagnosis     ICD-10-CM    1  Acute pain of left knee  M25 562    2  Primary osteoarthritis of left knee  M17 12                   Subjective: Pt reports that she is feeling better and has noted improvements in symptoms  States that on Tuesday she over did it after therapy  States that she was feeling great and then did more "walking/running around" that it reaggregated symptoms  Pain level today is 2/10  Objective: See treatment diary below      Assessment: Tolerated treatment fair  Patient demonstrates improvements in strength and ROM since starting therapy  Pt will continue to benefit form skilled physical therapy in order to optimize strength and improve quality of movement  Plan: Continue per plan of care  POC expires Auth Status Total   Visits  Start date  Expiration date PT/OT + Visit Limit?  Co-Insurance    No auth requried  22                                                Visit/Unit Tracking  AUTH Status: n/a Date 9/27 09/29 10/04 10/06  foto           Visits  Authed: n/a Used 1 2 3 4            Remaining                      Precautions: standard       Manuals 09/27/22 09/29/22 10/04/22 10/06  foto           STM hamstrings - IM  IM  IM            Patellafemoral sup/inf glides- im  IM  IM                                    Neuro Re-Ed             Quad set   X 20 5s hold  X 20 5s hold  5s x 20          Glute sets   X 20 5s hold  X 20 5s hold  review         TA iso   X 20 5s hold  X 20 5s hold  5s x 20 standard    5s x 10 heel slides               SLR x 10 w/ assistance             Bridges w/ TA x 10 cues req                                    Ther Ex             Warm up nustep   NS lvl 1 5 min  NS lvl 1 5 min  rec bike x 10 min          LAQ/SAQ  SAQ x 10 5s hold  saq x 15 5s hold  SAQ x 15 5s hold     LAQ x 10 5s hold SLR     3 x 4          Clamshells     2 x 6 w/ assistance          Heel slides   W/ sos x 15  W/ sos x 15 W/ sos x 10          PROM knee flex/ext   X 5 min  X 5 min  X 5 min             Sit to stand x 5 cues req    Sit to stands x 10                       Ther Activity                                       Gait Training                                       Modalities

## 2022-10-11 ENCOUNTER — OFFICE VISIT (OUTPATIENT)
Dept: PHYSICAL THERAPY | Facility: CLINIC | Age: 67
End: 2022-10-11
Payer: COMMERCIAL

## 2022-10-11 DIAGNOSIS — M17.12 PRIMARY OSTEOARTHRITIS OF LEFT KNEE: ICD-10-CM

## 2022-10-11 DIAGNOSIS — M25.562 ACUTE PAIN OF LEFT KNEE: Primary | ICD-10-CM

## 2022-10-11 PROCEDURE — 97112 NEUROMUSCULAR REEDUCATION: CPT

## 2022-10-11 PROCEDURE — 97140 MANUAL THERAPY 1/> REGIONS: CPT

## 2022-10-11 PROCEDURE — 97110 THERAPEUTIC EXERCISES: CPT

## 2022-10-11 NOTE — PROGRESS NOTES
Daily Note     Today's date: 10/11/2022  Patient name: Refugio Sosa  : 1955  MRN: [id number]  Referring provider: YUMIKO Monteiro*  Dx:   Encounter Diagnosis     ICD-10-CM    1  Acute pain of left knee  M25 562    2  Primary osteoarthritis of left knee  M17 12                   Subjective: Pt reports that she is feeling much better and has noted improvements in her symptoms  States that she was able to walk around a fair amount over the weekend and had little to no pain  Pain today is 2/10 in the left knee  Objective: See treatment diary below      Assessment: Tolerated treatment well  Patient demonstrated fatigue post treatment, exhibited good technique with therapeutic exercises and would benefit from continued PT      Plan: Continue per plan of care  POC expires Auth Status Total   Visits  Start date  Expiration date PT/OT + Visit Limit?  Co-Insurance    No auth requried  22                                                Visit/Unit Tracking  AUTH Status: n/a Date 9/27 09/29 10/04 10/06  foto           Visits  Authed: n/a Used 1 2 3 4            Remaining                      Precautions: standard       Manuals 09/27/22 09/29/22 10/04/22 10/06  foto 10/11/22          STM hamstrings - IM  IM  IM  IM           Patellafemoral sup/inf glides- im  IM  IM  IM                                   Neuro Re-Ed             Quad set   X 20 5s hold  X 20 5s hold  5s x 20  5s x 20         Glute sets   X 20 5s hold  X 20 5s hold  review         TA iso   X 20 5s hold  X 20 5s hold  5s x 20 standard    5s x 10 heel slides   5s x 20 standard 5s x 10 heel slides             SLR x 10 w/ assistance LSR x 15 no assistance             Bridges w/ TA x 10 cues req  bridges x 15  5s hld                                   Ther Ex             Warm up nustep   NS lvl 1 5 min  NS lvl 1 5 min  rec bike x 10 min  Ns lvl 1 10 min        LAQ/SAQ  SAQ x 10 5s hold  saq x 15 5s hold  SAQ x 15 5s hold     LAQ x 10 5s hold  SAQ x 20 5s hold   LAQ x 15 5s hold         SLR     3 x 4          Clamshells     2 x 6 w/ assistance  2 x 10 B         Heel slides   W/ sos x 15  W/ sos x 15 W/ sos x 10  X 25 w/ sos     W/ peanut x 10         PROM knee flex/ext   X 5 min  X 5 min  X 5 min  X 5 min            Sit to stand x 5 cues req    Sit to stands x 10                       Ther Activity                                       Gait Training                                       Modalities

## 2022-10-13 ENCOUNTER — OFFICE VISIT (OUTPATIENT)
Dept: PHYSICAL THERAPY | Facility: CLINIC | Age: 67
End: 2022-10-13
Payer: COMMERCIAL

## 2022-10-13 DIAGNOSIS — M25.562 ACUTE PAIN OF LEFT KNEE: Primary | ICD-10-CM

## 2022-10-13 DIAGNOSIS — M17.12 PRIMARY OSTEOARTHRITIS OF LEFT KNEE: ICD-10-CM

## 2022-10-13 PROCEDURE — 97140 MANUAL THERAPY 1/> REGIONS: CPT

## 2022-10-13 PROCEDURE — 97112 NEUROMUSCULAR REEDUCATION: CPT

## 2022-10-13 PROCEDURE — 97110 THERAPEUTIC EXERCISES: CPT

## 2022-10-13 NOTE — PROGRESS NOTES
Daily Note     Today's date: 10/13/2022  Patient name: John Paul Whitten  : 1955  MRN: [id number]  Referring provider: YUMIKO Alfaro*  Dx:   Encounter Diagnosis     ICD-10-CM    1  Acute pain of left knee  M25 562    2  Primary osteoarthritis of left knee  M17 12                   Subjective: Pt reports that she is feeling better and has noted improvements in symptoms  She does continue to have a small twinge in her knee when going up and down steps however, states that symptoms have been mild over the last week  Pain today is 2/10  Objective: See treatment diary below      Assessment: Tolerated treatment well  Patient demonstrated fatigue post treatment, exhibited good technique with therapeutic exercises and would benefit from continued PT      Plan: Continue per plan of care  POC expires Auth Status Total   Visits  Start date  Expiration date PT/OT + Visit Limit?  Co-Insurance    No auth requried  22                                                Visit/Unit Tracking  AUTH Status: n/a Date 9/27 09/29 10/04 10/06  foto 10/11 10/13         Visits  Authed: n/a Used 1 2 3 4 5 6          Remaining                      Precautions: standard       Manuals 09/27/22 09/29/22 10/04/22 10/06  foto 10/11/22 10/13         STM hamstrings - IM  IM  IM  IM  IM          Patellafemoral sup/inf glides- im  IM  IM  IM  IM                                 Neuro Re-Ed             Quad set   X 20 5s hold  X 20 5s hold  5s x 20  5s x 20  review        Glute sets   X 20 5s hold  X 20 5s hold  review         TA iso   X 20 5s hold  X 20 5s hold  5s x 20 standard    5s x 10 heel slides   5s x 20 standard 5s x 10 heel slides  5s x 20 standard   5s x 10 heel slides            SLR x 10 w/ assistance LSR x 15 no assistance  SLR x 10            Bridges w/ TA x 10 cues req  bridges x 15  5s hld  bridges x 15 5s hold                                  Ther Ex             Warm up nustep   NS lvl 1 5 min  NS lvl 1 5 min rec bike x 10 min  Ns lvl 1 10 min Ns lvl 1 x 10 min        LAQ/SAQ  SAQ x 10 5s hold  saq x 15 5s hold  SAQ x 15 5s hold     LAQ x 10 5s hold  SAQ x 20 5s hold   LAQ x 15 5s hold  Step ups 4' x 10 req cues        SLR     3 x 4   Step downs 4' x 10 req cues        Clamshells     2 x 6 w/ assistance  2 x 10 B  2 x 10 B        Heel slides   W/ sos x 15  W/ sos x 15 W/ sos x 10  X 25 w/ sos     W/ peanut x 10  X 25 w/o sos     X 15 w/ peanut        PROM knee flex/ext   X 5 min  X 5 min  X 5 min  X 5 min  5'           Sit to stand x 5 cues req    Sit to stands x 10                       Ther Activity                                       Gait Training                                       Modalities

## 2022-10-19 ENCOUNTER — OFFICE VISIT (OUTPATIENT)
Dept: PHYSICAL THERAPY | Facility: CLINIC | Age: 67
End: 2022-10-19
Payer: COMMERCIAL

## 2022-10-19 DIAGNOSIS — M25.562 ACUTE PAIN OF LEFT KNEE: Primary | ICD-10-CM

## 2022-10-19 DIAGNOSIS — M17.12 PRIMARY OSTEOARTHRITIS OF LEFT KNEE: ICD-10-CM

## 2022-10-19 PROCEDURE — 97112 NEUROMUSCULAR REEDUCATION: CPT

## 2022-10-19 PROCEDURE — 97110 THERAPEUTIC EXERCISES: CPT

## 2022-10-19 PROCEDURE — 97140 MANUAL THERAPY 1/> REGIONS: CPT

## 2022-10-19 NOTE — PROGRESS NOTES
Daily Note     Today's date: 10/19/2022  Patient name: Vandana Mann  : 1955  MRN: [id number]  Referring provider: YUMIKO Romo*  Dx:   Encounter Diagnosis     ICD-10-CM    1  Acute pain of left knee  M25 562    2  Primary osteoarthritis of left knee  M17 12                   Subjective: Pt reports that she is feeling well  States that she was able to dance at her friends wedding with no pain or discomfort  She is pleased with overall progress  She has mild discomfort in her knee when she is navigating steps  Objective: See treatment diary below      Assessment: Tolerated treatment well  Patient demonstrated fatigue post treatment, exhibited good technique with therapeutic exercises and would benefit from continued PT      Plan: Continue per plan of care  POC expires Auth Status Total   Visits  Start date  Expiration date PT/OT + Visit Limit?  Co-Insurance    No auth requried  22                                                Visit/Unit Tracking  AUTH Status: n/a Date 9/27 09/29 10/04 10/06  foto 10/11 10/13 10/19  foto        Visits  Authed: n/a Used 1 2 3 4 5 6 7         Remaining                      Precautions: standard       Manuals 09/27/22 09/29/22 10/04/22 10/06  foto 10/11/22 10/13 10/19        STM hamstrings - IM  IM  IM  IM  IM  IM        Patellafemoral sup/inf glides- im  IM  IM  IM  IM IM                                Neuro Re-Ed             Quad set   X 20 5s hold  X 20 5s hold  5s x 20  5s x 20  review        Glute sets   X 20 5s hold  X 20 5s hold  review         TA iso   X 20 5s hold  X 20 5s hold  5s x 20 standard    5s x 10 heel slides   5s x 20 standard 5s x 10 heel slides  5s x 20 standard   5s x 10 heel slides  5s x 20 standard 5s x 10           SLR x 10 w/ assistance LSR x 15 no assistance  SLR x 10  SLR x 10           Bridges w/ TA x 10 cues req  bridges x 15  5s hld  bridges x 15 5s hold  bridges x 15 5s hold                                 Ther Ex Warm up nustep   NS lvl 1 5 min  NS lvl 1 5 min  rec bike x 10 min  Ns lvl 1 10 min Ns lvl 1 x 10 min  NS lvl 1 x 10       LAQ/SAQ  SAQ x 10 5s hold  saq x 15 5s hold  SAQ x 15 5s hold     LAQ x 10 5s hold  SAQ x 20 5s hold   LAQ x 15 5s hold  Step ups 4' x 10 req cues  step ups 4' x 10 req cues       SLR     3 x 4   Step downs 4' x 10 req cues  step downs 4' x 10       Clamshells     2 x 6 w/ assistance  2 x 10 B  2 x 10 B  2 x 10       Heel slides   W/ sos x 15  W/ sos x 15 W/ sos x 10  X 25 w/ sos     W/ peanut x 10  X 25 w/o sos     X 15 w/ peanut  X 25 w/o sos x 15 peanut       PROM knee flex/ext   X 5 min  X 5 min  X 5 min  X 5 min  5'  5'          Sit to stand x 5 cues req    Sit to stands x 10                       Ther Activity                                       Gait Training                                       Modalities

## 2022-10-20 ENCOUNTER — OFFICE VISIT (OUTPATIENT)
Dept: PHYSICAL THERAPY | Facility: CLINIC | Age: 67
End: 2022-10-20
Payer: COMMERCIAL

## 2022-10-20 DIAGNOSIS — M17.12 PRIMARY OSTEOARTHRITIS OF LEFT KNEE: ICD-10-CM

## 2022-10-20 DIAGNOSIS — M25.562 ACUTE PAIN OF LEFT KNEE: Primary | ICD-10-CM

## 2022-10-20 PROCEDURE — 97112 NEUROMUSCULAR REEDUCATION: CPT

## 2022-10-20 PROCEDURE — 97140 MANUAL THERAPY 1/> REGIONS: CPT

## 2022-10-20 PROCEDURE — 97110 THERAPEUTIC EXERCISES: CPT

## 2022-10-20 NOTE — PROGRESS NOTES
Daily Note     Today's date: 10/20/2022  Patient name: Nam Larson  : 1955  MRN: [id number]  Referring provider: YUMIKO Alan*  Dx:   Encounter Diagnosis     ICD-10-CM    1  Acute pain of left knee  M25 562    2  Primary osteoarthritis of left knee  M17 12                   Subjective: Pt reports that she is feeling better and has noted improvements in symptoms since starting therapy  Pain is present when navigating steps, walking down hills,  or doing quick pivot turns  Little to no pain present today  Objective: See treatment diary below      Assessment: Tolerated treatment well  Patient demonstrated fatigue post treatment, exhibited good technique with therapeutic exercises and would benefit from continued PT      Plan: Continue per plan of care  POC expires Auth Status Total   Visits  Start date  Expiration date PT/OT + Visit Limit?  Co-Insurance    No auth requried  22                                                Visit/Unit Tracking  AUTH Status: n/a Date 9/27 09/29 10/04 10/06  foto 10/11 10/13 10/19  foto 10/20       Visits  Authed: n/a Used 1 2 3 4 5 6 7 8        Remaining                      Precautions: standard       Manuals 09/27/22 09/29/22 10/04/22 10/06  foto 10/11/22 10/13 10/19 10/20       STM hamstrings - IM  IM  IM  IM  IM  IM IM       Patellafemoral sup/inf glides- im  IM  IM  IM  IM IM IM                               Neuro Re-Ed             Quad set   X 20 5s hold  X 20 5s hold  5s x 20  5s x 20  review   reviewed      Glute sets   X 20 5s hold  X 20 5s hold  review         TA iso   X 20 5s hold  X 20 5s hold  5s x 20 standard    5s x 10 heel slides   5s x 20 standard 5s x 10 heel slides  5s x 20 standard   5s x 10 heel slides  5s x 20 standard 5s x 10  5s x 20 standard     5s x 10 march         SLR x 10 w/ assistance LSR x 15 no assistance  SLR x 10  SLR x 10  SLR 2 x 10          Bridges w/ TA x 10 cues req  bridges x 15  5s hld  bridges x 15 5s hold bridges x 15 5s hold  bridge w/ hip add x 10 5s hold                                Ther Ex             Warm up nustep   NS lvl 1 5 min  NS lvl 1 5 min  rec bike x 10 min  Ns lvl 1 10 min Ns lvl 1 x 10 min  NS lvl 1 x 10  upright bike x 10 min lvl 1      LAQ/SAQ  SAQ x 10 5s hold  saq x 15 5s hold  SAQ x 15 5s hold     LAQ x 10 5s hold  SAQ x 20 5s hold   LAQ x 15 5s hold  Step ups 4' x 10 req cues  step ups 4' x 10 req cues       SLR     3 x 4   Step downs 4' x 10 req cues  step downs 4' x 10       Clamshells     2 x 6 w/ assistance  2 x 10 B  2 x 10 B  2 x 10  2 x 10 w/ [place] loop     Heel slides   W/ sos x 15  W/ sos x 15 W/ sos x 10  X 25 w/ sos     W/ peanut x 10  X 25 w/o sos     X 15 w/ peanut  X 25 w/o sos x 15 peanut  X 25 w/o sos x 15   15 w/ peanut      PROM knee flex/ext   X 5 min  X 5 min  X 5 min  X 5 min  5'  5'  5'         Sit to stand x 5 cues req    Sit to stands x 10                       Ther Activity                                       Gait Training                                       Modalities

## 2022-10-25 ENCOUNTER — OFFICE VISIT (OUTPATIENT)
Dept: PHYSICAL THERAPY | Facility: CLINIC | Age: 67
End: 2022-10-25
Payer: COMMERCIAL

## 2022-10-25 DIAGNOSIS — M25.562 ACUTE PAIN OF LEFT KNEE: Primary | ICD-10-CM

## 2022-10-25 DIAGNOSIS — M17.12 PRIMARY OSTEOARTHRITIS OF LEFT KNEE: ICD-10-CM

## 2022-10-25 PROCEDURE — 97112 NEUROMUSCULAR REEDUCATION: CPT

## 2022-10-25 PROCEDURE — 97110 THERAPEUTIC EXERCISES: CPT

## 2022-10-25 NOTE — PROGRESS NOTES
Daily Note     Today's date: 10/25/2022  Patient name: Alethea Escobar  : 1955  MRN: [id number]  Referring provider: YUMIKO Wang*  Dx:   Encounter Diagnosis     ICD-10-CM    1  Acute pain of left knee  M25 562    2  Primary osteoarthritis of left knee  M17 12                   Subjective: Pt reports that she is doing well and has noted that stairs have improved  Mild discomfort today in her left knee  Pain level is 1/10  Objective: See treatment diary below      Assessment: Tolerated treatment well  Patient demonstrated fatigue post treatment, exhibited good technique with therapeutic exercises and would benefit from continued PT      Plan: Progress note during next visit  POC expires Auth Status Total   Visits  Start date  Expiration date PT/OT + Visit Limit? Co-Insurance    No auth requried  22                                                Visit/Unit Tracking  AUTH Status: n/a Date 9/27 09/29 10/04 10/06  foto 10/11 10/13 10/19  foto 10/20 10/25      Visits  Authed: n/a Used 1 2 3 4 5 6 7 8 9       Remaining                      Precautions: standard       Manuals 09/27/22 09/29/22 10/04/22 10/06  foto 10/11/22 10/13 10/19  foto 10/20 10/25      STM hamstrings - IM  IM  IM  IM  IM  IM IM       Patellafemoral sup/inf glides- im  IM  IM  IM  IM IM IM                               Neuro Re-Ed             Quad set   X 20 5s hold  X 20 5s hold  5s x 20  5s x 20  review   reviewed      Glute sets   X 20 5s hold  X 20 5s hold  review         TA iso   X 20 5s hold  X 20 5s hold  5s x 20 standard    5s x 10 heel slides   5s x 20 standard 5s x 10 heel slides  5s x 20 standard   5s x 10 heel slides  5s x 20 standard 5s x 10  5s x 20 standard     5s x 10 march         SLR x 10 w/ assistance LSR x 15 no assistance  SLR x 10  SLR x 10  SLR 2 x 10  SLR 2 x 10 flexion and abduction ea           Bridges w/ TA x 10 cues req  bridges x 15  5s hld  bridges x 15 5s hold  bridges x 15 5s hold bridge w/ hip add x 10 5s hold  bridge standard x 10 5s hold              bridge w/ marches x 10 B/l                 Ther Ex             Warm up nustep   NS lvl 1 5 min  NS lvl 1 5 min  rec bike x 10 min  Ns lvl 1 10 min Ns lvl 1 x 10 min  NS lvl 1 x 10  upright bike x 10 min lvl 1  rec bike x 10 min lvl 1     LAQ/SAQ  SAQ x 10 5s hold  saq x 15 5s hold  SAQ x 15 5s hold     LAQ x 10 5s hold  SAQ x 20 5s hold   LAQ x 15 5s hold  Step ups 4' x 10 req cues  step ups 4' x 10 req cues   prone glute sets x 10 5s hold     SLR     3 x 4   Step downs 4' x 10 req cues  step downs 4' x 10   prone hip ext x 10     Clamshells     2 x 6 w/ assistance  2 x 10 B  2 x 10 B  2 x 10  2 x 10 w/ [place] loop prone quad stretch x 10s  5 hold b/l     Heel slides   W/ sos x 15  W/ sos x 15 W/ sos x 10  X 25 w/ sos     W/ peanut x 10  X 25 w/o sos     X 15 w/ peanut  X 25 w/o sos x 15 peanut  X 25 w/o sos x 15   15 w/ peanut  W/ peanut x 25 heel slides     PROM knee flex/ext   X 5 min  X 5 min  X 5 min  X 5 min  5'  5'  5'  LAQ x 20 5s hold        Sit to stand x 5 cues req    Sit to stands x 10                       Ther Activity                                       Gait Training                                       Modalities

## 2022-10-26 ENCOUNTER — EVALUATION (OUTPATIENT)
Dept: PHYSICAL THERAPY | Facility: CLINIC | Age: 67
End: 2022-10-26
Payer: COMMERCIAL

## 2022-10-26 DIAGNOSIS — M17.12 PRIMARY OSTEOARTHRITIS OF LEFT KNEE: ICD-10-CM

## 2022-10-26 DIAGNOSIS — M25.562 ACUTE PAIN OF LEFT KNEE: Primary | ICD-10-CM

## 2022-10-26 PROCEDURE — 97112 NEUROMUSCULAR REEDUCATION: CPT

## 2022-10-26 PROCEDURE — 97110 THERAPEUTIC EXERCISES: CPT

## 2022-10-26 PROCEDURE — 97164 PT RE-EVAL EST PLAN CARE: CPT

## 2022-10-26 NOTE — PROGRESS NOTES
PT Re-Evaluation     Today's date: 10/26/2022  Patient name: Guillaume Hatfield  : 1955  MRN: [id number]  Referring provider: YUMIKO Jaime*  Dx:   Encounter Diagnosis     ICD-10-CM    1  Acute pain of left knee  M25 562    2  Primary osteoarthritis of left knee  M17 12                   Assessment  Assessment details: Update 10/26/22: Pt reports that stairs occasisionally present a challenge for her however overall she is pleased with progress; reports that she feels about 90% better since starting therapy  Pt has met all personal goals at this time and most of her therapeutic goals  She will be discharged to an HEP next session with an updated HEP and educated on appropriate progression  Guillaume Hatfield is a 75 y o  female who presents with pain, decreased strength, decreased ROM, ambulatory dysfunction, postural dysfunction and balance dysfunction  Due to these impairments, patient has difficulty performing ADL's, recreational activities, work-related activities, ambulation, stair negotiation, lifting/carrying  Patient's clinical presentation is consistent with their referring diagnosis of Acute pain of left knee  Plan: Ambulatory Referral to Physical Therapy    Primary osteoarthritis of left knee  Plan: Ambulatory Referral to Physical Therapy    Patient has been educated in home exercise program and plan of care   Patient would benefit from skilled physical therapy services to address their aforementioned functional limitations and progress towards prior level of function and independence with home exercise program      Impairments: abnormal gait, abnormal or restricted ROM, activity intolerance, impaired physical strength, lacks appropriate home exercise program, pain with function, weight-bearing intolerance, poor posture  and poor body mechanics    Goals  Short Term Goals to be accomplished in 3 weeks:  STG1: Pt will be I with HEP  STG2: Pt will demo 50% inc in knee AROM  STG3: Pt will demo 4+  MMT strength in knee   STG4: Pt will amb community distance without gait deviates due to pain     Long Term Goals to be accomplished in 6 weeks:   LTG1: Pt will demo knee strength WNL to return to PLOF  LTG2: Pt will demo knee AROM WNL to minimize gait deviations  LTG3: Pt will return to household ADLs and work duties pain free as per [address] details:  HEP development, stretching, strengthening, A/AA/PROM, joint mobilizations, posture education, STM/MI as needed to reduce muscle tension, muscle reeducation,Patient has been educated in Dx, prognosis and plan of care and is in agreement  Patient would benefit from: PT eval and skilled physical therapy  Planned modality interventions: cryotherapy and thermotherapy: hydrocollator packs  Planned therapy interventions: manual therapy, neuromuscular re-education, self care, therapeutic activities, therapeutic exercise and home exercise program  Treatment plan discussed with: patient        Subjective Evaluation    History of Present Illness  Mechanism of injury: Pt reports that she has been dealing with pain in the left knee for a month  Pt reports that she has a hard time walking up/down steps, walking up and down steps, walking/standing for more then 15 min  Pt reports that she feels like her knee  "might burst" and complains of pressure, sharp pain, and pressure when navigating steps  She did get an injection on Friday however, states that pain has not improved     Pain  Current pain ratin  At best pain rating: 3  At worst pain ratin  Quality: dull ache, throbbing, tight, discomfort, pressure, sharp and knife-like  Relieving factors: ice, rest and support    Social Support  Steps to enter house: yes  Stairs in house: yes   Lives in: multiple-level home  Lives with: spouse    Employment status: not working  Hand dominance: right  Exercise history: walking 30 min daily     Treatments  Current treatment: physical therapy  Patient Goals  Patient goals for therapy: increased strength, independence with ADLs/IADLs, return to sport/leisure activities, increased motion and decreased pain          Objective     Active Range of Motion   Left Knee   Flexion: 120 degrees   Extension: 0 degrees     Right Knee   Normal active range of motion    Strength/Myotome Testing     Left Knee   Flexion: 4+  Prone flexion: 4  Extension: 4+    Right Knee   Normal strength    Tests     Left Knee   Negative lateral Loraine, medial Loraine, patellar apprehension, patellar compression and patella-femoral grind  Precautions: standard       POC expires Auth Status Total   Visits  Start date  Expiration date PT/OT + Visit Limit? Co-Insurance    No auth requried  09/27/22                                                Visit/Unit Tracking  AUTH Status: n/a Date 9/27 09/29 10/04 10/06  foto 10/11 10/13 10/19  foto 10/20 10/25 10/26     Visits  Authed: n/a Used 1 2 3 4 5 6 7 8 9 10      Remaining                      Precautions: standard       Manuals 09/27/22 09/29/22 10/04/22 10/06  foto 10/11/22 10/13 10/19  foto 10/20 10/25 10/26     STM hamstrings - IM  IM  IM  IM  IM  IM IM  RE-eval     Patellafemoral sup/inf glides- im  IM  IM  IM  IM IM IM  IM                              Neuro Re-Ed             Quad set   X 20 5s hold  X 20 5s hold  5s x 20  5s x 20  review   reviewed      Glute sets   X 20 5s hold  X 20 5s hold  review         TA iso   X 20 5s hold  X 20 5s hold  5s x 20 standard    5s x 10 heel slides   5s x 20 standard 5s x 10 heel slides  5s x 20 standard   5s x 10 heel slides  5s x 20 standard 5s x 10  5s x 20 standard     5s x 10 march         SLR x 10 w/ assistance LSR x 15 no assistance  SLR x 10  SLR x 10  SLR 2 x 10  SLR 2 x 10 flexion and abduction ea  SLR  flexioon and abduction 2 x 10 e          Bridges w/ TA x 10 cues req  bridges x 15  5s hld  bridges x 15 5s hold  bridges x 15 5s hold  bridge w/ hip add x 10 5s hold  bridge standard x 10 5s hold  Bridge w/ marches x 10     Bridges standard x 10 5s hold             bridge w/ marches x 10 B/l                 Ther Ex             Warm up nustep   NS lvl 1 5 min  NS lvl 1 5 min  rec bike x 10 min  Ns lvl 1 10 min Ns lvl 1 x 10 min  NS lvl 1 x 10  upright bike x 10 min lvl 1  rec bike x 10 min lvl 1  upright bike x 10 min lvl  1   LAQ/SAQ  SAQ x 10 5s hold  saq x 15 5s hold  SAQ x 15 5s hold     LAQ x 10 5s hold  SAQ x 20 5s hold   LAQ x 15 5s hold  Step ups 4' x 10 req cues  step ups 4' x 10 req cues   prone glute sets x 10 5s hold  Prone glute sets x 20 5s hold    SLR     3 x 4   Step downs 4' x 10 req cues  step downs 4' x 10   prone hip ext x 10  Prone hip ext x 10 5s    Clamshells     2 x 6 w/ assistance  2 x 10 B  2 x 10 B  2 x 10  2 x 10 w/ [place] loop prone quad stretch x 10s  5 hold b/l  Prone quad strestch w/ sos x 10s  5    Heel slides   W/ sos x 15  W/ sos x 15 W/ sos x 10  X 25 w/ sos     W/ peanut x 10  X 25 w/o sos     X 15 w/ peanut  X 25 w/o sos x 15 peanut  X 25 w/o sos x 15   15 w/ peanut  W/ peanut x 25 heel slides  Heel slides w/ peanut x 25    PROM knee flex/ext   X 5 min  X 5 min  X 5 min  X 5 min  5'  5'  5'  LAQ x 20 5s hold  laq x 20       Sit to stand x 5 cues req    Sit to stands x 10                       Ther Activity                                       Gait Training                                       Modalities

## 2022-10-27 ENCOUNTER — OFFICE VISIT (OUTPATIENT)
Dept: PHYSICAL THERAPY | Facility: CLINIC | Age: 67
End: 2022-10-27
Payer: COMMERCIAL

## 2022-10-27 DIAGNOSIS — M25.562 ACUTE PAIN OF LEFT KNEE: Primary | ICD-10-CM

## 2022-10-27 DIAGNOSIS — M17.12 PRIMARY OSTEOARTHRITIS OF LEFT KNEE: ICD-10-CM

## 2022-10-27 PROCEDURE — 97110 THERAPEUTIC EXERCISES: CPT

## 2022-10-27 PROCEDURE — 97140 MANUAL THERAPY 1/> REGIONS: CPT

## 2022-10-27 NOTE — PROGRESS NOTES
Daily Note/Discharge    Today's date: 10/27/2022  Patient name: Shen Wagoner  : 1955  MRN: [id number]  Referring provider: YUMIKO Fu*  Dx:   Encounter Diagnosis     ICD-10-CM    1  Acute pain of left knee  M25 562    2  Primary osteoarthritis of left knee  M17 12                   Subjective: Pt reports that she feels ready to be discharged at this time  Feeling well and denies pain in her knee  Bi soreness in her quad muscle  Objective: See treatment diary below      Assessment: Tolerated treatment well  Patient demonstrates improvements in strength, ROM, and gait  She is ready to be discharged to an HEP at this time  She has been educated on appropriate progression of HEP and each exercises has been reviewed with her  Plan: discharge to an HEP      Precautions: standard       POC expires Auth Status Total   Visits  Start date  Expiration date PT/OT + Visit Limit?  Co-Insurance    No auth requried  22                                                Visit/Unit Tracking  AUTH Status: n/a Date 9/27 09/29 10/04 10/06  foto 10/11 10/13 10/19  foto 10/20 10/25 10/26 10/27    Visits  Authed: n/a Used 1 2 3 4 5 6 7 8 9 10 11     Remaining                      Precautions: standard       Manuals 09/27/22 09/29/22 10/04/22 10/06  foto 10/11/22 10/13 10/19  foto 10/20 10/25 10/26 10/27     STM hamstrings - IM  IM  IM  IM  IM  IM IM  RE-eval STM quad with muscle roller     Patellafemoral sup/inf glides- im  IM  IM  IM  IM IM IM  IM  IM                                Neuro Re-Ed              Quad set   X 20 5s hold  X 20 5s hold  5s x 20  5s x 20  review   reviewed       Glute sets   X 20 5s hold  X 20 5s hold  review          TA iso   X 20 5s hold  X 20 5s hold  5s x 20 standard    5s x 10 heel slides   5s x 20 standard 5s x 10 heel slides  5s x 20 standard   5s x 10 heel slides  5s x 20 standard 5s x 10  5s x 20 standard     5s x 10 march          SLR x 10 w/ assistance LSR x 15 no assistance  SLR x 10  SLR x 10  SLR 2 x 10  SLR 2 x 10 flexion and abduction ea  SLR  flexioon and abduction 2 x 10 e  SLR flexion, extension, and abduction- x 20 ea  Bridges w/ TA x 10 cues req  bridges x 15  5s hld  bridges x 15 5s hold  bridges x 15 5s hold  bridge w/ hip add x 10 5s hold  bridge standard x 10 5s hold  Bridge w/ marches x 10     Bridges standard x 10 5s hold  reviewed each w/ patient             bridge w/ marches x 10 B/l  Step up and lateral step ups x 15 w/ cues                 Ther Ex              Warm up nustep   NS lvl 1 5 min  NS lvl 1 5 min  rec bike x 10 min  Ns lvl 1 10 min Ns lvl 1 x 10 min  NS lvl 1 x 10  upright bike x 10 min lvl 1  rec bike x 10 min lvl 1  upright bike x 10 min lvl  1    LAQ/SAQ  SAQ x 10 5s hold  saq x 15 5s hold  SAQ x 15 5s hold     LAQ x 10 5s hold  SAQ x 20 5s hold   LAQ x 15 5s hold  Step ups 4' x 10 req cues  step ups 4' x 10 req cues   prone glute sets x 10 5s hold  Prone glute sets x 20 5s hold     SLR     3 x 4   Step downs 4' x 10 req cues  step downs 4' x 10   prone hip ext x 10  Prone hip ext x 10 5s     Clamshells     2 x 6 w/ assistance  2 x 10 B  2 x 10 B  2 x 10  2 x 10 w/ [place] loop prone quad stretch x 10s  5 hold b/l  Prone quad strestch w/ sos x 10s  5     Heel slides   W/ sos x 15  W/ sos x 15 W/ sos x 10  X 25 w/ sos     W/ peanut x 10  X 25 w/o sos     X 15 w/ peanut  X 25 w/o sos x 15 peanut  X 25 w/o sos x 15   15 w/ peanut  W/ peanut x 25 heel slides  Heel slides w/ peanut x 25  Heel slides x 20    PROM knee flex/ext   X 5 min  X 5 min  X 5 min  X 5 min  5'  5'  5'  LAQ x 20 5s hold  laq x 20  Hip add 5s x 20 sitting and supine      Sit to stand x 5 cues req    Sit to stands x 10                         Ther Activity                                          Gait Training                                          Modalities

## 2022-10-28 DIAGNOSIS — E78.5 HYPERLIPIDEMIA LDL GOAL <130: ICD-10-CM

## 2022-10-29 RX ORDER — ATORVASTATIN CALCIUM 10 MG/1
TABLET, FILM COATED ORAL
Qty: 30 TABLET | Refills: 1 | Status: SHIPPED | OUTPATIENT
Start: 2022-10-29

## 2022-11-01 LAB
ALBUMIN SERPL-MCNC: 4.5 G/DL (ref 3.8–4.8)
ALBUMIN/GLOB SERPL: 2 {RATIO} (ref 1.2–2.2)
ALP SERPL-CCNC: 128 IU/L (ref 44–121)
ALT SERPL-CCNC: 36 IU/L (ref 0–32)
AST SERPL-CCNC: 24 IU/L (ref 0–40)
BILIRUB SERPL-MCNC: 0.5 MG/DL (ref 0–1.2)
BUN SERPL-MCNC: 15 MG/DL (ref 8–27)
BUN/CREAT SERPL: 15 (ref 12–28)
CALCIUM SERPL-MCNC: 9.4 MG/DL (ref 8.7–10.3)
CHLORIDE SERPL-SCNC: 105 MMOL/L (ref 96–106)
CHOLEST SERPL-MCNC: 226 MG/DL (ref 100–199)
CO2 SERPL-SCNC: 26 MMOL/L (ref 20–29)
CREAT SERPL-MCNC: 0.97 MG/DL (ref 0.57–1)
EGFR: 64 ML/MIN/1.73
GLOBULIN SER-MCNC: 2.3 G/DL (ref 1.5–4.5)
GLUCOSE SERPL-MCNC: 101 MG/DL (ref 70–99)
HBA1C MFR BLD: 5.5 % (ref 4.8–5.6)
HDLC SERPL-MCNC: 69 MG/DL
LDLC SERPL CALC-MCNC: 140 MG/DL (ref 0–99)
MICRODELETION SYND BLD/T FISH: NORMAL
POTASSIUM SERPL-SCNC: 4.4 MMOL/L (ref 3.5–5.2)
PROT SERPL-MCNC: 6.8 G/DL (ref 6–8.5)
SODIUM SERPL-SCNC: 143 MMOL/L (ref 134–144)
TRIGL SERPL-MCNC: 97 MG/DL (ref 0–149)

## 2022-11-26 LAB — FLECAINIDE SERPL-MCNC: 0.49 UG/ML (ref 0.2–1)

## 2022-11-28 ENCOUNTER — TELEPHONE (OUTPATIENT)
Dept: CARDIOLOGY CLINIC | Facility: CLINIC | Age: 67
End: 2022-11-28

## 2022-11-28 NOTE — TELEPHONE ENCOUNTER
----- Message from Colletta Croissant, MD sent at 11/28/2022  9:43 AM EST -----  Patient blood test reviewed  They are acceptable  Will continue same medication  Patient should keep his appointment for follow-up

## 2022-12-22 DIAGNOSIS — E78.5 HYPERLIPIDEMIA LDL GOAL <130: ICD-10-CM

## 2022-12-22 DIAGNOSIS — I48.0 PAROXYSMAL ATRIAL FIBRILLATION (HCC): ICD-10-CM

## 2022-12-22 RX ORDER — FLECAINIDE ACETATE 100 MG/1
TABLET ORAL
Qty: 60 TABLET | Refills: 3 | Status: SHIPPED | OUTPATIENT
Start: 2022-12-22

## 2022-12-22 RX ORDER — ATORVASTATIN CALCIUM 10 MG/1
TABLET, FILM COATED ORAL
Qty: 30 TABLET | Refills: 1 | Status: SHIPPED | OUTPATIENT
Start: 2022-12-22

## 2022-12-22 RX ORDER — APIXABAN 5 MG/1
TABLET, FILM COATED ORAL
Qty: 60 TABLET | Refills: 3 | Status: SHIPPED | OUTPATIENT
Start: 2022-12-22

## 2023-01-23 DIAGNOSIS — I48.0 PAROXYSMAL ATRIAL FIBRILLATION (HCC): ICD-10-CM

## 2023-01-23 DIAGNOSIS — E78.5 HYPERLIPIDEMIA LDL GOAL <130: ICD-10-CM

## 2023-01-23 RX ORDER — ATORVASTATIN CALCIUM 10 MG/1
10 TABLET, FILM COATED ORAL DAILY
Qty: 30 TABLET | Refills: 1 | Status: SHIPPED | OUTPATIENT
Start: 2023-01-23 | End: 2023-02-08 | Stop reason: SDUPTHER

## 2023-01-23 RX ORDER — APIXABAN 5 MG/1
5 TABLET, FILM COATED ORAL 2 TIMES DAILY
Qty: 60 TABLET | Refills: 0 | Status: SHIPPED | OUTPATIENT
Start: 2023-01-23 | End: 2023-02-08 | Stop reason: SDUPTHER

## 2023-01-23 RX ORDER — FLECAINIDE ACETATE 100 MG/1
100 TABLET ORAL 2 TIMES DAILY
Qty: 60 TABLET | Refills: 0 | Status: SHIPPED | OUTPATIENT
Start: 2023-01-23 | End: 2023-02-08 | Stop reason: SDUPTHER

## 2023-02-08 ENCOUNTER — OFFICE VISIT (OUTPATIENT)
Dept: CARDIOLOGY CLINIC | Facility: CLINIC | Age: 68
End: 2023-02-08

## 2023-02-08 VITALS
HEART RATE: 62 BPM | WEIGHT: 197 LBS | DIASTOLIC BLOOD PRESSURE: 84 MMHG | SYSTOLIC BLOOD PRESSURE: 120 MMHG | OXYGEN SATURATION: 96 % | HEIGHT: 66 IN | BODY MASS INDEX: 31.66 KG/M2

## 2023-02-08 DIAGNOSIS — R00.2 PALPITATION: ICD-10-CM

## 2023-02-08 DIAGNOSIS — E78.5 HYPERLIPIDEMIA LDL GOAL <130: ICD-10-CM

## 2023-02-08 DIAGNOSIS — I10 BENIGN ESSENTIAL HYPERTENSION: ICD-10-CM

## 2023-02-08 DIAGNOSIS — I48.0 PAROXYSMAL ATRIAL FIBRILLATION (HCC): ICD-10-CM

## 2023-02-08 DIAGNOSIS — E66.9 OBESITY (BMI 30-39.9): ICD-10-CM

## 2023-02-08 DIAGNOSIS — E78.5 DYSLIPIDEMIA: ICD-10-CM

## 2023-02-08 DIAGNOSIS — R06.02 EXERTIONAL SHORTNESS OF BREATH: ICD-10-CM

## 2023-02-08 RX ORDER — AMLODIPINE BESYLATE 5 MG/1
5 TABLET ORAL DAILY
Qty: 90 TABLET | Refills: 2 | Status: SHIPPED | OUTPATIENT
Start: 2023-02-08

## 2023-02-08 RX ORDER — APIXABAN 5 MG/1
5 TABLET, FILM COATED ORAL 2 TIMES DAILY
Qty: 180 TABLET | Refills: 3 | Status: SHIPPED | OUTPATIENT
Start: 2023-02-08

## 2023-02-08 RX ORDER — ATORVASTATIN CALCIUM 10 MG/1
10 TABLET, FILM COATED ORAL DAILY
Qty: 90 TABLET | Refills: 2 | Status: SHIPPED | OUTPATIENT
Start: 2023-02-08

## 2023-02-08 RX ORDER — FLECAINIDE ACETATE 100 MG/1
100 TABLET ORAL 2 TIMES DAILY
Qty: 180 TABLET | Refills: 2 | Status: SHIPPED | OUTPATIENT
Start: 2023-02-08

## 2023-02-08 NOTE — PROGRESS NOTES
Consultation - Cardiology Office  [facility]  Dayna Salguero 75 y o  female MRN: [id number]  : 1955  Unit/Bed#:  Encounter: [id number]      Assessment:     1  Paroxysmal atrial fibrillation (HCC)    2  Exertional shortness of breath    3  Benign essential hypertension    4  Dyslipidemia    5  Obesity (BMI 30-39 9)    6  Palpitation        Discussion summary and Plan:      1  Paroxysmal atrial fibrillation  Currently doing well continue low-dose metoprolol and continue flecainide 100 mg twice a day  Her flecainide level in 2021 0 49  Continue metoprolol and flecainide at the current doses  Will check flecainide level again  She had a brief period of atrial fibrillation but she spontaneously converted after few minutes  She is maintaining sinus rhythm  2  Exertion shortness of breath  She has mild exertional shortness of breath particularly when she exert much more her normal activities  She had a normal nuclear stress test in 2019 and normal LV systolic function   No change in her symptoms  3  Benign essential hypertension  Blood pressure was noted to be high at primary care doctor's office blood pressure is acceptable here amlodipine is 5 mg daily  Her blood pressure is now acceptable  4  History of mitral valve disorder  No evidence of any significant mitral valve disease  Echo reviewed other issues  5  History of dyslipidemia  Continue statin she is on Lipitor 10 mg  Encouraged her to better control her cholesterol  She was scheduled repeat blood test with     6  History of impaired glucose metabolism his blood glucose 102  Lytes recent labs from  reviewed  7  Class 1 obesity with BMI 31 strongly advised to lose weight   31 8  Continue current Rx  Check flecainide level  She will monitor her blood pressure dietary changes advised cut back on salt try to lose weight    If blood pressure goes up we may need to increase amlodipine  Thank you for your consultation  If he have any question please call me at [phone]    Counseling :  A description of the counseling  Goals and Barriers  Patient's ability to self care: Yes  Medication side effect reviewed with patient in detail and all their questions answered to their satisfaction  Primary Care Physician: Ray Bunch DO      HPI :     Valeriy De León is a 75y o  year old female who   Self-referred as she was in [facility] in the hospital with atrial fibrillation with rapid ventricular rate  Patient was at a function in [facility] when next the morning she found that she could not breathe she was taken to local hospital where she was found to be in AFib with rapid ventricular rate  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  As per patient she has a KATT guided cardioversion and she was given IV diuretics as well as Cardizem and started on Eliquis  She flew back Sunday and she has been doing well since yesterday she started feeling little lightheaded and fatigue and tired and today she came to see us in she is found to be in AFib with heart rate around 124 beats per minute  Unfortunately she has to fly tomorrow afternoon for her daughter's wedding to [place] but she is in atrial fibrillation  She is little upset with whole situation  No nausea no vomiting no PND no orthopnea  She has echo and KATT done but no stress test was done  We do not have those records available  She did bring some records from the hospital which includes labs her TSH was acceptable  Her liver enzymes were elevated  And she had probably pleural effusion as mentioned in the discharge recommendation to her  She is not taking any diuretic at this time  08/05/2022  Above reviewed  Patient came for follow-up she is doing well  She has no new symptoms but few weeks ago she may have brief episode of atrial fibrillation    She is very anxious and busy  But today she has sinus rhythm heart rate 63 beats per minute  She has medical history significant for paroxysmal atrial fibrillation currently suppressed with flecainide, dyslipidemia on statin, possible sick sinus syndrome  No chest pain she does get exertional shortness of breath which is not changed  He is also compliant with her statins  Her flecainide level in July 21 was 0 49       2/8/2023  Above reviewed  Patient came for follow-up she is doing well  She is doing now much better as she has retired  She had a history of paroxysmal atrial fibrillation currently in sinus suppressed with flecainide, dyslipidemia on statin with recently increased dose, sick sinus syndrome who came for follow-up  She denies any chest pain any shortness of breath  She has no other cardiovascular issues blood test done October 2022 shows LDL was 140 other electrolytes were stable  EKG shows no change from previous EKG  Heart rate is 62 bpm   Her flecainide level was checked on 10/31/2022 it was 0 49  Review of Systems   Constitutional: Negative for activity change, chills, diaphoresis, fever and unexpected weight change  HENT: Negative for congestion  Eyes: Negative for discharge and redness  Respiratory: Negative for cough, chest tightness, shortness of breath and wheezing  Cardiovascular: Negative  Negative for chest pain, palpitations and leg swelling  Gastrointestinal: Negative for abdominal pain, diarrhea and nausea  Endocrine: Negative  Genitourinary: Negative for decreased urine volume and urgency  Musculoskeletal: Negative  Negative for arthralgias, back pain and gait problem  Skin: Negative for rash and wound  Allergic/Immunologic: Negative  Neurological: Negative for dizziness, seizures, syncope, weakness, light-headedness and headaches  Hematological: Negative  Psychiatric/Behavioral: Negative for agitation and confusion   The patient is not nervous/anxious          Historical Information   Past Medical History:   Diagnosis Date   • Atrial fibrillation ([address] )    • Hypertension    • Mitral valve prolapse    • Obesity     last assessed 4/27/2017     Past Surgical History:   Procedure Laterality Date   • CATARACT EXTRACTION      B/L   • HEMORRHOID SURGERY       Social History     Substance and Sexual Activity   Alcohol Use Yes   • Alcohol/week: 3 0 standard drinks   • Types: 3 Glasses of wine per week     Social History     Substance and Sexual Activity   Drug Use Never     Social History     Tobacco Use   Smoking Status Former   • Types: Cigarettes   Smokeless Tobacco Never     Family History:   Family History   Problem Relation Age of Onset   • Hyperlipidemia Mother    • Hypertension Mother    • No Known Problems Father    • No Known Problems Sister    • No Known Problems Daughter    • No Known Problems Maternal Grandmother    • No Known Problems Paternal Grandmother    • No Known Problems Sister    • No Known Problems Daughter    • Pancreatic cancer Maternal Aunt 76   • No Known Problems Maternal Aunt    • No Known Problems Maternal Aunt    • No Known Problems Maternal Aunt    • No Known Problems Paternal Aunt    • No Known Problems Paternal Aunt    • No Known Problems Paternal Aunt        Meds/Allergies     No Known Allergies    Current Outpatient Medications:   •  amLODIPine (NORVASC) 5 mg tablet, TAKE 1 TABLET DAILY, Disp: 90 tablet, Rfl: 1  •  atorvastatin (LIPITOR) 10 mg tablet, Take 1 tablet (10 mg total) by mouth daily, Disp: 30 tablet, Rfl: 1  •  Eliquis 5 MG, Take 1 tablet (5 mg total) by mouth 2 (two) times a day, Disp: 60 tablet, Rfl: 0  •  flecainide (TAMBOCOR) 100 mg tablet, Take 1 tablet (100 mg total) by mouth 2 (two) times a day, Disp: 60 tablet, Rfl: 0  •  metoprolol tartrate (LOPRESSOR) 25 mg tablet, TAKE 1 TABLET EVERY 12 HOURS, Disp: 180 tablet, Rfl: 3  •  amoxicillin (AMOXIL) 500 mg capsule, Before dental appointments (Patient not taking: Reported on 10/1/2021), Disp: , Rfl:     Vitals: Blood pressure 120/84, pulse 62, height 5' 6" (1 676 m), weight 89 4 kg (197 lb), SpO2 96 %  ?  Body mass index is 31 8 kg/m²  Vitals:    02/08/23 1254   Weight: 89 4 kg (197 lb)     BP Readings from Last 3 Encounters:   02/08/23 120/84   09/20/22 167/78   08/05/22 120/82         Physical Exam  Constitutional:       General: She is not in acute distress  Appearance: She is well-developed  She is not diaphoretic  Neck:      Thyroid: No thyromegaly  Vascular: No JVD  Trachea: No tracheal deviation  Cardiovascular:      Rate and Rhythm: Normal rate and regular rhythm  Heart sounds: S1 normal and S2 normal  Heart sounds not distant  Murmur heard  Systolic (ejection) murmur is present with a grade of 2/6  No friction rub  No gallop  No S3 or S4 sounds  Pulmonary:      Effort: Pulmonary effort is normal  No respiratory distress  Breath sounds: Normal breath sounds  No wheezing or rales  Chest:      Chest wall: No tenderness  Abdominal:      General: Bowel sounds are normal  There is no distension  Palpations: Abdomen is soft  Tenderness: There is no abdominal tenderness  Musculoskeletal:         General: No deformity  Cervical back: Neck supple  Skin:     General: Skin is warm and dry  Coloration: Skin is not pale  Findings: No rash  Neurological:      Mental Status: She is alert and oriented to person, place, and time  Psychiatric:         Behavior: Behavior normal          Judgment: Judgment normal            Diagnostic Studies Review Cardio:  Echo stress test reviewed    EKG:      Twelve lead EKG done in our office on 04/23/2019 shows atrial fibrillation with rapid ventricular rate heart rate 124 beats per minute  Twelve lead EKG done in our office 05/17/2019 shows normal sinus rhythm heart rate 58 beats per minute  Left atrial enlargement  Incomplete RBBB    As compared to old EKG patient is now in sinus rhythm  Twelve lead EKG done 09/24/2019 shows normal sinus rhythm RSR pattern heart rate 78 beats per minute no significant change from old EKG patient is staying in sinus rhythm  Twelve lead EKG shows atrial fibrillation with heart rate 139 beats per minute  As compared to previous EKG she is now in atrial fibrillation  Twelve lead EKG 03/22/2021 shows sinus rhythm heart rate 59 beats per minute incomplete RBBB  No change from previous EKG QS in V1 to V3 most likely due to lead location  12 lead EKG done 10/01/2021 shows normal sinus rhythm heart rate 54 beats per minute  Incomplete RBBB  no change from previous EKG  Twelve lead EKG 08/05/2022 shows sinus rhythm  milliseconds  Left axis deviation  Incomplete RBBB note significant change from previous EKG  Heart rate is 63 beats per minute  Twelve-lead EKG done 2/8/2023 shows sinus rhythm left is deviation incomplete RBBB    No other significant normality heart rate 62 bpm   QTc 470 ms not changed from previous EKG      Lab Review     BMP:  Lab Results   Component Value Date    SODIUM 143 10/31/2022    K 4 4 10/31/2022     10/31/2022    CO2 26 10/31/2022    BUN 15 10/31/2022    CREATININE 0 97 10/31/2022    GLUC 101 (H) 10/31/2022    CALCIUM 9 0 01/20/2020    EGFR 64 10/31/2022     LFT:  Lab Results   Component Value Date    AST 24 10/31/2022    ALT 36 (H) 10/31/2022    ALKPHOS 90 01/20/2020    TP 6 8 10/31/2022    ALB 4 5 10/31/2022      No results found for: [facility]  Lab Results   Component Value Date    HGBA1C 5 5 10/31/2022     Lipid Profile:   Lab Results   Component Value Date    CHOLESTEROL 226 (H) 10/31/2022    HDL 69 10/31/2022    LDLCALC 140 (H) 10/31/2022    TRIG 97 10/31/2022     Lab Results   Component Value Date    CHOLESTEROL 226 (H) 10/31/2022    CHOLESTEROL 205 (H) 07/29/2021       Flecainide level in July 2021 0 49    Dr Daron Pinzon MD [facility]      "This note has been constructed using a voice recognition system  Therefore there may be syntax, spelling, and/or grammatical errors   Please call if you have any questions  "

## 2023-02-21 ENCOUNTER — TELEPHONE (OUTPATIENT)
Dept: FAMILY MEDICINE CLINIC | Facility: CLINIC | Age: 68
End: 2023-02-21

## 2023-02-21 NOTE — TELEPHONE ENCOUNTER
Pt wants mammo order and routine lab work ordered before her Welcome to Medicare scheduled on 3/9/23  Pls let pt know when ready

## 2023-02-23 DIAGNOSIS — I48.91 ATRIAL FIBRILLATION WITH CONTROLLED VENTRICULAR RATE (HCC): ICD-10-CM

## 2023-02-23 DIAGNOSIS — R73.01 IFG (IMPAIRED FASTING GLUCOSE): ICD-10-CM

## 2023-02-23 DIAGNOSIS — Z12.31 BREAST CANCER SCREENING BY MAMMOGRAM: ICD-10-CM

## 2023-02-23 DIAGNOSIS — I10 BENIGN ESSENTIAL HYPERTENSION: Primary | ICD-10-CM

## 2023-02-23 DIAGNOSIS — E78.5 HYPERLIPIDEMIA LDL GOAL <130: ICD-10-CM

## 2023-02-27 DIAGNOSIS — E78.5 HYPERLIPIDEMIA LDL GOAL <130: ICD-10-CM

## 2023-02-27 RX ORDER — ATORVASTATIN CALCIUM 10 MG/1
TABLET, FILM COATED ORAL
Qty: 30 TABLET | Refills: 1 | Status: SHIPPED | OUTPATIENT
Start: 2023-02-27 | End: 2023-03-09 | Stop reason: SDUPTHER

## 2023-03-02 ENCOUNTER — RA CDI HCC (OUTPATIENT)
Dept: OTHER | Facility: HOSPITAL | Age: 68
End: 2023-03-02

## 2023-03-04 PROBLEM — R73.03 PREDIABETES: Status: ACTIVE | Noted: 2022-06-20

## 2023-03-04 PROBLEM — E78.5 DYSLIPIDEMIA: Status: RESOLVED | Noted: 2019-11-07 | Resolved: 2023-03-04

## 2023-03-04 PROBLEM — I48.0 PAF (PAROXYSMAL ATRIAL FIBRILLATION) (HCC): Status: ACTIVE | Noted: 2019-11-07

## 2023-03-04 PROBLEM — Z00.00 WELCOME TO MEDICARE PREVENTIVE VISIT: Status: ACTIVE | Noted: 2023-03-04

## 2023-03-08 LAB
ALBUMIN SERPL-MCNC: 4.5 G/DL (ref 3.8–4.8)
ALBUMIN/GLOB SERPL: 2.1 {RATIO} (ref 1.2–2.2)
ALP SERPL-CCNC: 108 IU/L (ref 44–121)
ALT SERPL-CCNC: 33 IU/L (ref 0–32)
AST SERPL-CCNC: 23 IU/L (ref 0–40)
BASOPHILS # BLD AUTO: 0 X10E3/UL (ref 0–0.2)
BASOPHILS NFR BLD AUTO: 0 %
BILIRUB SERPL-MCNC: 0.4 MG/DL (ref 0–1.2)
BUN SERPL-MCNC: 17 MG/DL (ref 8–27)
BUN/CREAT SERPL: 20 (ref 12–28)
CALCIUM SERPL-MCNC: 9.3 MG/DL (ref 8.7–10.3)
CHLORIDE SERPL-SCNC: 104 MMOL/L (ref 96–106)
CHOLEST SERPL-MCNC: 216 MG/DL (ref 100–199)
CO2 SERPL-SCNC: 25 MMOL/L (ref 20–29)
CREAT SERPL-MCNC: 0.86 MG/DL (ref 0.57–1)
EGFR: 74 ML/MIN/1.73
EOSINOPHIL # BLD AUTO: 0.1 X10E3/UL (ref 0–0.4)
EOSINOPHIL NFR BLD AUTO: 2 %
ERYTHROCYTE [DISTWIDTH] IN BLOOD BY AUTOMATED COUNT: 13 % (ref 11.7–15.4)
GLOBULIN SER-MCNC: 2.1 G/DL (ref 1.5–4.5)
GLUCOSE SERPL-MCNC: 102 MG/DL (ref 70–99)
HBA1C MFR BLD: 5.5 % (ref 4.8–5.6)
HCT VFR BLD AUTO: 40.8 % (ref 34–46.6)
HDLC SERPL-MCNC: 60 MG/DL
HGB BLD-MCNC: 13.7 G/DL (ref 11.1–15.9)
IMM GRANULOCYTES # BLD: 0 X10E3/UL (ref 0–0.1)
IMM GRANULOCYTES NFR BLD: 0 %
LDLC SERPL CALC-MCNC: 140 MG/DL (ref 0–99)
LYMPHOCYTES # BLD AUTO: 1.2 X10E3/UL (ref 0.7–3.1)
LYMPHOCYTES NFR BLD AUTO: 20 %
MCH RBC QN AUTO: 29.3 PG (ref 26.6–33)
MCHC RBC AUTO-ENTMCNC: 33.6 G/DL (ref 31.5–35.7)
MCV RBC AUTO: 87 FL (ref 79–97)
MICRODELETION SYND BLD/T FISH: NORMAL
MONOCYTES # BLD AUTO: 0.4 X10E3/UL (ref 0.1–0.9)
MONOCYTES NFR BLD AUTO: 7 %
NEUTROPHILS # BLD AUTO: 4.2 X10E3/UL (ref 1.4–7)
NEUTROPHILS NFR BLD AUTO: 71 %
PLATELET # BLD AUTO: 217 X10E3/UL (ref 150–450)
POTASSIUM SERPL-SCNC: 3.9 MMOL/L (ref 3.5–5.2)
PROT SERPL-MCNC: 6.6 G/DL (ref 6–8.5)
RBC # BLD AUTO: 4.68 X10E6/UL (ref 3.77–5.28)
SODIUM SERPL-SCNC: 141 MMOL/L (ref 134–144)
TRIGL SERPL-MCNC: 92 MG/DL (ref 0–149)
WBC # BLD AUTO: 6 X10E3/UL (ref 3.4–10.8)

## 2023-03-09 ENCOUNTER — OFFICE VISIT (OUTPATIENT)
Dept: FAMILY MEDICINE CLINIC | Facility: CLINIC | Age: 68
End: 2023-03-09

## 2023-03-09 VITALS
DIASTOLIC BLOOD PRESSURE: 70 MMHG | OXYGEN SATURATION: 98 % | HEIGHT: 66 IN | HEART RATE: 63 BPM | SYSTOLIC BLOOD PRESSURE: 130 MMHG | BODY MASS INDEX: 31.34 KG/M2 | WEIGHT: 195 LBS | TEMPERATURE: 97.1 F | RESPIRATION RATE: 16 BRPM

## 2023-03-09 DIAGNOSIS — E66.9 OBESITY (BMI 30-39.9): ICD-10-CM

## 2023-03-09 DIAGNOSIS — I10 BENIGN ESSENTIAL HYPERTENSION: ICD-10-CM

## 2023-03-09 DIAGNOSIS — R73.03 PREDIABETES: ICD-10-CM

## 2023-03-09 DIAGNOSIS — Z23 IMMUNIZATION DUE: ICD-10-CM

## 2023-03-09 DIAGNOSIS — E78.5 HYPERLIPIDEMIA LDL GOAL <130: ICD-10-CM

## 2023-03-09 DIAGNOSIS — I48.0 PAF (PAROXYSMAL ATRIAL FIBRILLATION) (HCC): ICD-10-CM

## 2023-03-09 DIAGNOSIS — Z00.00 WELCOME TO MEDICARE PREVENTIVE VISIT: Primary | ICD-10-CM

## 2023-03-09 RX ORDER — ATORVASTATIN CALCIUM 20 MG/1
20 TABLET, FILM COATED ORAL DAILY
Qty: 90 TABLET | Refills: 1 | Status: SHIPPED | OUTPATIENT
Start: 2023-03-09

## 2023-03-09 NOTE — PROGRESS NOTES
Assessment/Plan:    No problem-specific Assessment & Plan notes found for this encounter  Welcome to medicare    Increase lipitor 10mg to 20mg for more risk reduction and more LDL reduction    PAF per cardiology    htn stable     Diagnoses and all orders for this visit:    Welcome to Medicare preventive visit    Benign essential hypertension  -     Comprehensive metabolic panel; Future    Hyperlipidemia LDL goal <130  -     atorvastatin (LIPITOR) 20 mg tablet; Take 1 tablet (20 mg total) by mouth daily  -     Lipid Panel with Direct LDL reflex; Future    PAF (paroxysmal atrial fibrillation) (HCC)    Prediabetes  -     Hemoglobin A1C; Future    Immunization due  -     Pneumococcal Conjugate Vaccine 20-valent (Pcv20)              Return in about 6 months (around 9/9/2023) for Recheck  Subjective:      Patient ID: Deirdre Cummings is a 75 y o  female  Chief Complaint   Patient presents with   • Medicare Wellness Visit     Kamryn Fortune    The following portions of the patient's history were reviewed and updated as appropriate: allergies, current medications, past family history, past medical history, past social history, past surgical history and problem list     Review of Systems      Current Outpatient Medications   Medication Sig Dispense Refill   • amLODIPine (NORVASC) 5 mg tablet Take 1 tablet (5 mg total) by mouth daily 90 tablet 2   • atorvastatin (LIPITOR) 20 mg tablet Take 1 tablet (20 mg total) by mouth daily 90 tablet 1   • Eliquis 5 MG Take 1 tablet (5 mg total) by mouth 2 (two) times a day 180 tablet 3   • flecainide (TAMBOCOR) 100 mg tablet Take 1 tablet (100 mg total) by mouth 2 (two) times a day 180 tablet 2   • metoprolol tartrate (LOPRESSOR) 25 mg tablet Take 1 tablet (25 mg total) by mouth every 12 (twelve) hours 180 tablet 3     No current facility-administered medications for this visit         Objective:    /70   Pulse 63   Temp (!) 97 1 °F (36 2 °C)   Resp 16   Ht 5' 6" (1 676 m)   Wt 88 5 kg (195 lb)   SpO2 98%   BMI 31 47 kg/m²        Physical Exam           Laith Vera DO

## 2023-03-10 NOTE — PROGRESS NOTES
Assessment and Plan:     Problem List Items Addressed This Visit        Active Problems    Benign essential hypertension    Relevant Orders    Comprehensive metabolic panel    Hyperlipidemia LDL goal <130    Relevant Medications    atorvastatin (LIPITOR) 20 mg tablet    Other Relevant Orders    Lipid Panel with Direct LDL reflex    Immunization due    Relevant Orders    Pneumococcal Conjugate Vaccine 20-valent (Pcv20) (Completed)    PAF (paroxysmal atrial fibrillation) (HCC)    Prediabetes    Relevant Orders    Hemoglobin A1C    Welcome to Medicare preventive visit - Primary    Obesity (BMI 30-39  9)        Preventive health issues were discussed with patient, and age appropriate screening tests were ordered as noted in patient's After Visit Summary  Personalized health advice and appropriate referrals for health education or preventive services given if needed, as noted in patient's After Visit Summary  History of Present Illness:     Patient presents for a Medicare Wellness Visit    HPI   Patient Care Team:  Dorinda Martins DO as PCP - General     Review of Systems:     Review of Systems     Problem List:     Patient Active Problem List   Diagnosis   • Allergic rhinitis   • Benign essential hypertension   • Hyperlipidemia LDL goal <130   • Macular degeneration   • Mitral valve disorder   • Obesity (BMI 30-39  9)   • Immunization due   • Other fatigue   • PAF (paroxysmal atrial fibrillation) (HCC)   • Palpitation   • Screening for cardiovascular, respiratory, and genitourinary diseases   • Screening for diabetes mellitus (DM)   • Personal history of colonic polyps   • Postmenopausal state   • Breast cancer screening by mammogram   • Prediabetes   • Welcome to Medicare preventive visit      Past Medical and Surgical History:     Past Medical History:   Diagnosis Date   • Atrial fibrillation ([address] )    • Hypertension    • Mitral valve prolapse    • Obesity     last assessed 4/27/2017     Past Surgical History: Procedure Laterality Date   • CATARACT EXTRACTION      B/L   • HEMORRHOID SURGERY        Family History:     Family History   Problem Relation Age of Onset   • Hyperlipidemia Mother    • Hypertension Mother    • No Known Problems Father    • No Known Problems Sister    • No Known Problems Daughter    • No Known Problems Maternal Grandmother    • No Known Problems Paternal Grandmother    • No Known Problems Sister    • No Known Problems Daughter    • Pancreatic cancer Maternal Aunt 76   • No Known Problems Maternal Aunt    • No Known Problems Maternal Aunt    • No Known Problems Maternal Aunt    • No Known Problems Paternal Aunt    • No Known Problems Paternal Aunt    • No Known Problems Paternal Aunt       Social History:     Social History     Socioeconomic History   • Marital status: /Civil Union     Spouse name: None   • Number of children: None   • Years of education: None   • Highest education level: None   Occupational History   • None   Tobacco Use   • Smoking status: Never   • Smokeless tobacco: Never   Substance and Sexual Activity   • Alcohol use: Yes     Alcohol/week: 3 0 standard drinks     Types: 3 Glasses of wine per week   • Drug use: Never   • Sexual activity: None   Other Topics Concern   • None   Social History Narrative   • None     Social Determinants of Health     Financial Resource Strain: Low Risk    • Difficulty of Paying Living Expenses: Not hard at all   Food Insecurity: Not on file   Transportation Needs: No Transportation Needs   • Lack of Transportation (Medical): No   • Lack of Transportation (Non-Medical):  No   Physical Activity: Not on file   Stress: Not on file   Social Connections: Not on file   Intimate Partner Violence: Not on file   Housing Stability: Not on file      Medications and Allergies:     Current Outpatient Medications   Medication Sig Dispense Refill   • amLODIPine (NORVASC) 5 mg tablet Take 1 tablet (5 mg total) by mouth daily 90 tablet 2   • atorvastatin (LIPITOR) 20 mg tablet Take 1 tablet (20 mg total) by mouth daily 90 tablet 1   • Eliquis 5 MG Take 1 tablet (5 mg total) by mouth 2 (two) times a day 180 tablet 3   • flecainide (TAMBOCOR) 100 mg tablet Take 1 tablet (100 mg total) by mouth 2 (two) times a day 180 tablet 2   • metoprolol tartrate (LOPRESSOR) 25 mg tablet Take 1 tablet (25 mg total) by mouth every 12 (twelve) hours 180 tablet 3     No current facility-administered medications for this visit  No Known Allergies   Immunizations:     Immunization History   Administered Date(s) Administered   • COVID-19 MODERNA VACC 0 5 ML IM 02/25/2021, 03/25/2021   • H1N1, All Formulations 01/26/2010   • Influenza Quadrivalent Preservative Free 3 years and older IM 12/02/2014   • Influenza Split Preservative Free ID 01/29/2013, 01/30/2013   • Influenza, high dose seasonal 0 7 mL 09/10/2020, 09/24/2021   • Influenza, recombinant, quadrivalent,injectable, preservative free 01/11/2019   • Influenza, seasonal, injectable 01/26/2010, 10/25/2013, 10/06/2015   • Pneumococcal Conjugate Vaccine 20-valent (Pcv20), Polysace 03/09/2023   • Pneumococcal Polysaccharide PPV23 03/10/2020   • Tdap 06/22/2009   • Zoster Vaccine Recombinant 09/24/2021      Health Maintenance:         Topic Date Due   • Hepatitis C Screening  Never done   • Breast Cancer Screening: Mammogram  01/04/2023   • Colorectal Cancer Screening  05/07/2029         Topic Date Due   • COVID-19 Vaccine (3 - Booster for Moderna series) 05/20/2021   • Influenza Vaccine (1) 09/01/2022      Medicare Screening Tests and Risk Assessments:     Cynthiachapin Hirsch is here for her Welcome to Medicare visit  Health Risk Assessment:   Patient rates overall health as very good  Patient feels that their physical health rating is same  Patient is very satisfied with their life  Eyesight was rated as slightly worse  Hearing was rated as same  Patient feels that their emotional and mental health rating is slightly better   Patients states they are never, rarely angry  Patient states they are never, rarely unusually tired/fatigued  Pain experienced in the last 7 days has been none  Patient states that she has experienced no weight loss or gain in last 6 months  Depression Screening:   PHQ-2 Score: 0      Fall Risk Screening: In the past year, patient has experienced: no history of falling in past year      Urinary Incontinence Screening:   Patient has not leaked urine accidently in the last six months  Home Safety:  Patient does not have trouble with stairs inside or outside of their home  Patient has working smoke alarms and has working carbon monoxide detector  Home safety hazards include: none  Nutrition:   Current diet is Regular  Medications:   Patient is not currently taking any over-the-counter supplements  Patient is able to manage medications  Activities of Daily Living (ADLs)/Instrumental Activities of Daily Living (IADLs):   Walk and transfer into and out of bed and chair?: Yes  Dress and groom yourself?: Yes    Bathe or shower yourself?: Yes    Feed yourself?  Yes  Do your laundry/housekeeping?: Yes  Manage your money, pay your bills and track your expenses?: Yes  Make your own meals?: Yes    Do your own shopping?: Yes    Previous Hospitalizations:   Any hospitalizations or ED visits within the last 12 months?: No      Advance Care Planning:   Living will: No    Durable POA for healthcare: No    Advanced directive: No      PREVENTIVE SCREENINGS      Cardiovascular Screening:    General: Screening Not Indicated and History Lipid Disorder      Diabetes Screening:     General: Screening Current      Colorectal Cancer Screening:     General: Screening Current      Breast Cancer Screening:     General: Screening Current      Cervical Cancer Screening:    General: Screening Not Indicated      Osteoporosis Screening:    General: Risks and Benefits Discussed      Abdominal Aortic Aneurysm (AAA) Screening:        General: Screening Not Indicated      Lung Cancer Screening:     General: Screening Not Indicated      Hepatitis C Screening:    General: Risks and Benefits Discussed    Hep C Screening Accepted: No     Screening, Brief Intervention, and Referral to Treatment (SBIRT)    Screening  Typical number of drinks in a day: 0  Typical number of drinks in a week: 3  Interpretation: Low risk drinking behavior  AUDIT-C Screenin) How often did you have a drink containing alcohol in the past year? 2 to 3 times a week  2) How many drinks did you have on a typical day when you were drinking in the past year? 0  3) How often did you have 6 or more drinks on one occasion in the past year? never    AUDIT-C Score: 3  Interpretation: Score 3-12 (female): POSITIVE screen for alcohol misuse    AUDIT Screenin) How often during the last year have you found that you were not able to stop drinking once you had started? 0 - never  5) How often during the last year have you failed to do what was normally expected from you because of drinking? 0 - never  6) How often during the last year have you needed a first drink in the morning to get yourself going after a heavy drinking session?  0 - never  7) How often during the last year have you had a feeling of guilt or remorse after drinking? 0 - never  8) How often during the last year have you been unable to remember what happened the night before because you had been drinking? 0 - never  9) Have you or someone else been injured as a result of your drinking? 0 - no  10) Has a relative or friend or a doctor or another health worker been concerned about your drinking or suggested you cut down? 0 - no    AUDIT Score: 3  Interpretation: Low risk alcohol consumption    Single Item Drug Screening:  How often have you used an illegal drug (including marijuana) or a prescription medication for non-medical reasons in the past year? never    Single Item Drug Screen Score: 0  Interpretation: Negative screen for possible drug use disorder    Other Counseling Topics:   Car/seat belt/driving safety and regular weightbearing exercise and calcium and vitamin D intake  No results found  Physical Exam:     /70   Pulse 63   Temp (!) 97 1 °F (36 2 °C)   Resp 16   Ht 5' 6" (1 676 m)   Wt 88 5 kg (195 lb)   SpO2 98%   BMI 31 47 kg/m²     Physical Exam  Vitals and nursing note reviewed  Constitutional:       General: She is not in acute distress  Appearance: She is well-developed  She is not ill-appearing  HENT:      Head: Normocephalic and atraumatic  Right Ear: There is no impacted cerumen  Left Ear: There is no impacted cerumen  Nose: No congestion  Eyes:      General: No scleral icterus  Conjunctiva/sclera: Conjunctivae normal    Neck:      Vascular: No carotid bruit  Cardiovascular:      Rate and Rhythm: Normal rate and regular rhythm  Heart sounds: No murmur heard  Pulmonary:      Effort: Pulmonary effort is normal  No respiratory distress  Breath sounds: Normal breath sounds  Abdominal:      Palpations: Abdomen is soft  Tenderness: There is no abdominal tenderness  Musculoskeletal:         General: No swelling  Cervical back: Neck supple  Right lower leg: No edema  Left lower leg: No edema  Skin:     General: Skin is warm and dry  Capillary Refill: Capillary refill takes less than 2 seconds  Coloration: Skin is not pale  Neurological:      Mental Status: She is alert  Motor: No weakness        Gait: Gait normal    Psychiatric:         Mood and Affect: Mood normal          Behavior: Behavior normal           Skylar Holman DO

## 2023-03-10 NOTE — PATIENT INSTRUCTIONS
Medicare Preventive Visit Patient Instructions  Thank you for completing your Welcome to Medicare Visit or Medicare Annual Wellness Visit today  Your next wellness visit will be due in one year (3/9/2024)  The screening/preventive services that you may require over the next 5-10 years are detailed below  Some tests may not apply to you based off risk factors and/or age  Screening tests ordered at today's visit but not completed yet may show as past due  Also, please note that scanned in results may not display below  Preventive Screenings:  Service Recommendations Previous Testing/Comments   Colorectal Cancer Screening  * Colonoscopy    * Fecal Occult Blood Test (FOBT)/Fecal Immunochemical Test (FIT)  * Fecal DNA/Cologuard Test  * Flexible Sigmoidoscopy Age: 39-70 years old   Colonoscopy: every 10 years (may be performed more frequently if at higher risk)  OR  FOBT/FIT: every 1 year  OR  Cologuard: every 3 years  OR  Sigmoidoscopy: every 5 years  Screening may be recommended earlier than age 39 if at higher risk for colorectal cancer  Also, an individualized decision between you and your healthcare provider will decide whether screening between the ages of 74-80 would be appropriate  Colonoscopy: 05/07/2019  FOBT/FIT: Not on file  Cologuard: Not on file  Sigmoidoscopy: Not on file          Breast Cancer Screening Age: 36 years old  Frequency: every 1-2 years  Not required if history of left and right mastectomy Mammogram: 01/04/2022        Cervical Cancer Screening Between the ages of 21-29, pap smear recommended once every 3 years  Between the ages of 33-67, can perform pap smear with HPV co-testing every 5 years     Recommendations may differ for women with a history of total hysterectomy, cervical cancer, or abnormal pap smears in past  Pap Smear: Not on file        Hepatitis C Screening Once for adults born between [facility]  More frequently in patients at high risk for Hepatitis C Hep C Antibody: Not on file        Diabetes Screening 1-2 times per year if you're at risk for diabetes or have pre-diabetes Fasting glucose: No results in last 5 years (No results in last 5 years)  A1C: 5 5 % (3/7/2023)      Cholesterol Screening Once every 5 years if you don't have a lipid disorder  May order more often based on risk factors  Lipid panel: 03/07/2023          Other Preventive Screenings Covered by Medicare:  1  Abdominal Aortic Aneurysm (AAA) Screening: covered once if your at risk  You're considered to be at risk if you have a family history of AAA  2  Lung Cancer Screening: covers low dose CT scan once per year if you meet all of the following conditions: (1) Age 50-69; (2) No signs or symptoms of lung cancer; (3) Current smoker or have quit smoking within the last 15 years; (4) You have a tobacco smoking history of at least 20 pack years (packs per day multiplied by number of years you smoked); (5) You get a written order from a healthcare provider  3  Glaucoma Screening: covered annually if you're considered high risk: (1) You have diabetes OR (2) Family history of glaucoma OR (3)  aged 48 and older OR (3)  American aged 72 and older  3  Osteoporosis Screening: covered every 2 years if you meet one of the following conditions: (1) You're estrogen deficient and at risk for osteoporosis based off medical history and other findings; (2) Have a vertebral abnormality; (3) On glucocorticoid therapy for more than 3 months; (4) Have primary hyperparathyroidism; (5) On osteoporosis medications and need to assess response to drug therapy  · Last bone density test (DXA Scan): 01/04/2022   5  HIV Screening: covered annually if you're between the age of 15-65  Also covered annually if you are younger than 13 and older than 72 with risk factors for HIV infection  For pregnant patients, it is covered up to 3 times per pregnancy      Immunizations:  Immunization Recommendations   Influenza Vaccine Annual influenza vaccination during flu season is recommended for all persons aged >= 6 months who do not have contraindications   Pneumococcal Vaccine   * Pneumococcal conjugate vaccine = PCV13 (Prevnar 13), PCV15 (Vaxneuvance), PCV20 (Prevnar 20)  * Pneumococcal polysaccharide vaccine = PPSV23 (Pneumovax) Adults 25-60 years old: 1-3 doses may be recommended based on certain risk factors  Adults 72 years old: 1-2 doses may be recommended based off what pneumonia vaccine you previously received   Hepatitis B Vaccine 3 dose series if at intermediate or high risk (ex: diabetes, end stage renal disease, liver disease)   Tetanus (Td) Vaccine - COST NOT COVERED BY MEDICARE PART B Following completion of primary series, a booster dose should be given every 10 years to maintain immunity against tetanus  Td may also be given as tetanus wound prophylaxis  Tdap Vaccine - COST NOT COVERED BY MEDICARE PART B Recommended at least once for all adults  For pregnant patients, recommended with each pregnancy  Shingles Vaccine (Shingrix) - COST NOT COVERED BY MEDICARE PART B  2 shot series recommended in those aged 48 and above     Health Maintenance Due:      Topic Date Due   • Hepatitis C Screening  Never done   • Breast Cancer Screening: Mammogram  01/04/2023   • Colorectal Cancer Screening  05/07/2029     Immunizations Due:      Topic Date Due   • COVID-19 Vaccine (3 - Booster for Moderna series) 05/20/2021   • Influenza Vaccine (1) 09/01/2022     Advance Directives   What are advance directives? Advance directives are legal documents that state your wishes and plans for medical care  These plans are made ahead of time in case you lose your ability to make decisions for yourself  Advance directives can apply to any medical decision, such as the treatments you want, and if you want to donate organs  What are the types of advance directives?   There are many types of advance directives, and each state has rules about how to use them  You may choose a combination of any of the following:  · Living will: This is a written record of the treatment you want  You can also choose which treatments you do not want, which to limit, and which to stop at a certain time  This includes surgery, medicine, IV fluid, and tube feedings  · Durable power of  for healthcare [place] [facility]): This is a written record that states who you want to make healthcare choices for you when you are unable to make them for yourself  This person, called a proxy, is usually a family member or a friend  You may choose more than 1 proxy  · Do not resuscitate (DNR) order:  A DNR order is used in case your heart stops beating or you stop breathing  It is a request not to have certain forms of treatment, such as CPR  A DNR order may be included in other types of advance directives  · Medical directive: This covers the care that you want if you are in a coma, near death, or unable to make decisions for yourself  You can list the treatments you want for each condition  Treatment may include pain medicine, surgery, blood transfusions, dialysis, IV or tube feedings, and a ventilator (breathing machine)  · Values history: This document has questions about your views, beliefs, and how you feel and think about life  This information can help others choose the care that you would choose  Why are advance directives important? An advance directive helps you control your care  Although spoken wishes may be used, it is better to have your wishes written down  Spoken wishes can be misunderstood, or not followed  Treatments may be given even if you do not want them  An advance directive may make it easier for your family to make difficult choices about your care  Weight Management   Why it is important to manage your weight:  Being overweight increases your risk of health conditions such as heart disease, high blood pressure, type 2 diabetes, and certain types of cancer   It can also increase your risk for osteoarthritis, sleep apnea, and other respiratory problems  Aim for a slow, steady weight loss  Even a small amount of weight loss can lower your risk of health problems  How to lose weight safely:  A safe and healthy way to lose weight is to eat fewer calories and get regular exercise  You can lose up about 1 pound a week by decreasing the number of calories you eat by 500 calories each day  Healthy meal plan for weight management:  A healthy meal plan includes a variety of foods, contains fewer calories, and helps you stay healthy  A healthy meal plan includes the following:  · Eat whole-grain foods more often  A healthy meal plan should contain fiber  Fiber is the part of grains, fruits, and vegetables that is not broken down by your body  Whole-grain foods are healthy and provide extra fiber in your diet  Some examples of whole-grain foods are whole-wheat breads and pastas, oatmeal, brown rice, and bulgur  · Eat a variety of vegetables every day  Include dark, leafy greens such as spinach, kale, stevan greens, and mustard greens  Eat yellow and orange vegetables such as carrots, sweet potatoes, and winter squash  · Eat a variety of fruits every day  Choose fresh or canned fruit (canned in its own juice or light syrup) instead of juice  Fruit juice has very little or no fiber  · Eat low-fat dairy foods  Drink fat-free (skim) milk or 1% milk  Eat fat-free yogurt and low-fat cottage cheese  Try low-fat cheeses such as mozzarella and other reduced-fat cheeses  · Choose meat and other protein foods that are low in fat  Choose beans or other legumes such as split peas or lentils  Choose fish, skinless poultry (chicken or turkey), or lean cuts of red meat (beef or pork)  Before you cook meat or poultry, cut off any visible fat  · Use less fat and oil  Try baking foods instead of frying them   Add less fat, such as margarine, sour cream, regular salad dressing and mayonnaise to foods  Eat fewer high-fat foods  Some examples of high-fat foods include french fries, doughnuts, ice cream, and cakes  · Eat fewer sweets  Limit foods and drinks that are high in sugar  This includes candy, cookies, regular soda, and sweetened drinks  Exercise:  Exercise at least 30 minutes per day on most days of the week  Some examples of exercise include walking, biking, dancing, and swimming  You can also fit in more physical activity by taking the stairs instead of the elevator or parking farther away from stores  Ask your healthcare provider about the best exercise plan for you  © Copyright [employer] 2018 Information is for End User's use only and may not be sold, redistributed or otherwise used for commercial purposes   All illustrations and images included in CareNotes® are the copyrighted property of A D A M , Inc  or [address] [place] [address]

## 2023-04-02 DIAGNOSIS — E78.5 HYPERLIPIDEMIA LDL GOAL <130: ICD-10-CM

## 2023-04-03 RX ORDER — ATORVASTATIN CALCIUM 10 MG/1
TABLET, FILM COATED ORAL
Qty: 30 TABLET | Refills: 5 | OUTPATIENT
Start: 2023-04-03

## 2023-04-28 ENCOUNTER — HOSPITAL ENCOUNTER (OUTPATIENT)
Dept: RADIOLOGY | Facility: HOSPITAL | Age: 68
Discharge: HOME | End: 2023-04-28
Attending: FAMILY MEDICINE

## 2023-04-28 VITALS — WEIGHT: 195 LBS | BODY MASS INDEX: 31.34 KG/M2 | HEIGHT: 66 IN

## 2023-04-28 DIAGNOSIS — Z12.31 BREAST CANCER SCREENING BY MAMMOGRAM: ICD-10-CM

## 2023-05-03 PROBLEM — Z00.00 WELCOME TO MEDICARE PREVENTIVE VISIT: Status: RESOLVED | Noted: 2023-03-04 | Resolved: 2023-05-03

## 2023-07-14 ENCOUNTER — OFFICE VISIT (OUTPATIENT)
Dept: PODIATRY | Facility: CLINIC | Age: 68
End: 2023-07-14
Payer: MEDICARE

## 2023-07-14 VITALS
BODY MASS INDEX: 31.34 KG/M2 | HEIGHT: 66 IN | WEIGHT: 195 LBS | RESPIRATION RATE: 16 BRPM | DIASTOLIC BLOOD PRESSURE: 70 MMHG | SYSTOLIC BLOOD PRESSURE: 130 MMHG

## 2023-07-14 DIAGNOSIS — M21.962 ACQUIRED DEFORMITY OF LEFT FOOT: ICD-10-CM

## 2023-07-14 DIAGNOSIS — M72.2 PLANTAR FASCIITIS: Primary | ICD-10-CM

## 2023-07-14 DIAGNOSIS — M77.32 CALCANEAL SPUR OF LEFT FOOT: ICD-10-CM

## 2023-07-14 DIAGNOSIS — M79.672 LEFT FOOT PAIN: ICD-10-CM

## 2023-07-14 PROCEDURE — 73620 X-RAY EXAM OF FOOT: CPT | Performed by: PODIATRIST

## 2023-07-14 PROCEDURE — 99202 OFFICE O/P NEW SF 15 MIN: CPT | Performed by: PODIATRIST

## 2023-07-14 PROCEDURE — 29540 STRAPPING ANKLE &/FOOT: CPT | Performed by: PODIATRIST

## 2023-07-14 PROCEDURE — 20550 NJX 1 TENDON SHEATH/LIGAMENT: CPT | Performed by: PODIATRIST

## 2023-07-14 NOTE — PROGRESS NOTES
Assessment/Plan:  Planter fasciitis/heel spur syndrome left foot. Acquired deformity of foot. Pain. Acquired pes planus. Plan. Foot exam performed. Chart reviewed. X-rays taken reviewed. Patient advised on condition. Today 1.25 cc Kenalog 10 injected into left heel without pain or complication. Patient will take Tylenol as needed. Left arch bound the LowDye arch strapping fashion. Patient advised on aftercare. Diagnoses and all orders for this visit:    Plantar fasciitis    Left foot pain    Acquired deformity of left foot    Calcaneal spur of left foot          Subjective: Patient has pain. She has pain in her left heel upon rising and ambulation. No history of trauma. This has been ongoing. No Known Allergies      Current Outpatient Medications:   •  amLODIPine (NORVASC) 5 mg tablet, Take 1 tablet (5 mg total) by mouth daily, Disp: 90 tablet, Rfl: 2  •  atorvastatin (LIPITOR) 20 mg tablet, Take 1 tablet (20 mg total) by mouth daily, Disp: 90 tablet, Rfl: 1  •  Eliquis 5 MG, Take 1 tablet (5 mg total) by mouth 2 (two) times a day, Disp: 180 tablet, Rfl: 3  •  flecainide (TAMBOCOR) 100 mg tablet, Take 1 tablet (100 mg total) by mouth 2 (two) times a day, Disp: 180 tablet, Rfl: 2  •  metoprolol tartrate (LOPRESSOR) 25 mg tablet, Take 1 tablet (25 mg total) by mouth every 12 (twelve) hours, Disp: 180 tablet, Rfl: 3    Patient Active Problem List   Diagnosis   • Allergic rhinitis   • Benign essential hypertension   • Hyperlipidemia LDL goal <130   • Macular degeneration   • Mitral valve disorder   • Obesity (BMI 30-39. 9)   • Immunization due   • Other fatigue   • PAF (paroxysmal atrial fibrillation) (HCC)   • Palpitation   • Screening for cardiovascular, respiratory, and genitourinary diseases   • Screening for diabetes mellitus (DM)   • Personal history of colonic polyps   • Postmenopausal state   • Breast cancer screening by mammogram   • Prediabetes          Patient ID: Reanna Meadows is a 70 y.o. female. HPI    The following portions of the patient's history were reviewed and updated as appropriate:     family history includes Hyperlipidemia in her mother; Hypertension in her mother; No Known Problems in her daughter, daughter, father, maternal aunt, maternal aunt, maternal aunt, maternal grandmother, paternal aunt, paternal aunt, paternal aunt, paternal grandmother, sister, and sister; Pancreatic cancer (age of onset: 76) in her maternal aunt. reports that she has never smoked. She has never used smokeless tobacco. She reports current alcohol use of about 3.0 standard drinks of alcohol per week. She reports that she does not use drugs. Vitals:    07/14/23 1326   BP: 130/70   Resp: 16       Review of Systems      Objective:  Patient's shoes and socks removed. Foot Exam    General  General Appearance: appears stated age and healthy   Orientation: alert and oriented to person, place, and time   Affect: appropriate   Gait: antalgic       Right Foot/Ankle     Inspection and Palpation  Swelling: dorsum   Arch: pes planus    Neurovascular  Dorsalis pedis: 3+  Posterior tibial: 3+      Left Foot/Ankle      Inspection and Palpation  Tenderness: bony tenderness, plantar fascia and calcaneus tenderness   Swelling: dorsum   Arch: pes planus    Neurovascular  Dorsalis pedis: 3+  Posterior tibial: 3+        Physical Exam  Vitals and nursing note reviewed. Constitutional:       Appearance: Normal appearance. Cardiovascular:      Rate and Rhythm: Normal rate and regular rhythm. Pulses:           Dorsalis pedis pulses are 3+ on the right side and 3+ on the left side. Posterior tibial pulses are 3+ on the right side and 3+ on the left side. Musculoskeletal:      Left foot: Bony tenderness present. Feet:      Comments: Patient is pronated in stance and gait. Pain with palpation left plantar fascia insertion. X-ray demonstrates a large plantar calcaneal spur.   Skin:     Capillary Refill: Capillary refill takes less than 2 seconds. Neurological:      Mental Status: She is alert. Psychiatric:         Mood and Affect: Mood normal.         Behavior: Behavior normal.         Thought Content:  Thought content normal.         Judgment: Judgment normal.

## 2023-07-24 ENCOUNTER — TELEPHONE (OUTPATIENT)
Dept: CARDIOLOGY CLINIC | Facility: CLINIC | Age: 68
End: 2023-07-24

## 2023-07-24 NOTE — TELEPHONE ENCOUNTER
Patient called to report that she believes she is in afib at this time. Did you want to have her come for nurse visit?

## 2023-07-25 ENCOUNTER — TELEPHONE (OUTPATIENT)
Dept: CARDIOLOGY CLINIC | Facility: CLINIC | Age: 68
End: 2023-07-25

## 2023-07-25 ENCOUNTER — OFFICE VISIT (OUTPATIENT)
Dept: CARDIOLOGY CLINIC | Facility: CLINIC | Age: 68
End: 2023-07-25
Payer: MEDICARE

## 2023-07-25 VITALS
OXYGEN SATURATION: 98 % | HEART RATE: 81 BPM | BODY MASS INDEX: 31.18 KG/M2 | SYSTOLIC BLOOD PRESSURE: 122 MMHG | HEIGHT: 66 IN | DIASTOLIC BLOOD PRESSURE: 64 MMHG | WEIGHT: 194 LBS

## 2023-07-25 DIAGNOSIS — R06.02 EXERTIONAL SHORTNESS OF BREATH: ICD-10-CM

## 2023-07-25 DIAGNOSIS — I48.0 PAF (PAROXYSMAL ATRIAL FIBRILLATION) (HCC): Primary | ICD-10-CM

## 2023-07-25 DIAGNOSIS — I48.0 PAF (PAROXYSMAL ATRIAL FIBRILLATION) (HCC): ICD-10-CM

## 2023-07-25 DIAGNOSIS — E78.5 DYSLIPIDEMIA: ICD-10-CM

## 2023-07-25 DIAGNOSIS — R00.2 PALPITATION: ICD-10-CM

## 2023-07-25 DIAGNOSIS — E66.9 OBESITY (BMI 30-39.9): ICD-10-CM

## 2023-07-25 DIAGNOSIS — I10 BENIGN ESSENTIAL HYPERTENSION: ICD-10-CM

## 2023-07-25 PROCEDURE — 93000 ELECTROCARDIOGRAM COMPLETE: CPT | Performed by: INTERNAL MEDICINE

## 2023-07-25 PROCEDURE — 99214 OFFICE O/P EST MOD 30 MIN: CPT | Performed by: INTERNAL MEDICINE

## 2023-07-25 RX ORDER — BRIMONIDINE TARTRATE 2 MG/ML
SOLUTION/ DROPS OPHTHALMIC
COMMUNITY
Start: 2023-06-27

## 2023-07-25 RX ORDER — LATANOPROST 50 UG/ML
SOLUTION/ DROPS OPHTHALMIC
COMMUNITY
Start: 2023-06-11

## 2023-07-25 NOTE — TELEPHONE ENCOUNTER
Pt inquiring if she'll need another flecainide level drawn prior to next follow up. If so can you place the order.

## 2023-07-25 NOTE — PROGRESS NOTES
Consultation - Cardiology Office  [facility]. Yasmine Gonzales 70 y.o. female MRN: [id number]  : 1955  Unit/Bed#:  Encounter: [id number]      Assessment:     1. PAF (paroxysmal atrial fibrillation) ([address])    2. Exertional shortness of breath    3. Benign essential hypertension    4. Dyslipidemia    5. Obesity (BMI 30-39.9)    6. Palpitation        Discussion summary and Plan:      1. Paroxysmal atrial fibrillation. Patient is back in atrial fibrillation. Heart rate is 81 bpm.  She feels fatigue and tired. We discussed the options available to us. We discussed the option of ablation versus recardioversion. She is scheduled to go on vacation in the next 2 weeks. At this time she would prefer cardioversion and if there is a reoccurrence she is willing to consider ablation therapy. She understand that even after cardioversion A-fib can come back. Renew same medication at this time      2. Exertion shortness of breath. She feels more fatigue and tired and has exertional shortness of breath since she is in atrial fibrillation. She is scheduled for cardioversion  Sh  3. Benign essential hypertension. Blood pressure acceptable with current therapy. She is on metoprolol and amlodipine. 4. History of mitral valve disorder. No evidence of any significant mitral valve disease. Echo reviewed other issues. 5. History of dyslipidemia. The profile shows cholesterol is elevated Lipitor was increased to 20 mg     6. History of impaired glucose metabolism his blood glucose 102. Lytes recent labs from  reviewed. 7. Class 1 obesity with BMI 31 strongly advised to lose weight.  31.8. She does have a history of snoring. She may need sleep study. Due to current cardiac medication. Patient will be scheduled for cardioversion. Further plan results of cardioversion become billable. Thank you for your consultation.   If he have any question please call me at [phone]    Counseling :  A description of the counseling. Goals and Barriers. Patient's ability to self care: Yes  Medication side effect reviewed with patient in detail and all their questions answered to their satisfaction. Primary Care Physician: Velvet Lynne DO      HPI :     Екатерина Matos is a 70y.o. year old female who   Self-referred as she was in [facility] in the hospital with atrial fibrillation with rapid ventricular rate. Patient was at a function in [facility] when next the morning she found that she could not breathe she was taken to local hospital where she was found to be in AFib with rapid ventricular rate. 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 As per patient she has a KATT guided cardioversion and she was given IV diuretics as well as Cardizem and started on Eliquis. She flew back Sunday and she has been doing well since yesterday she started feeling little lightheaded and fatigue and tired and today she came to see us in she is found to be in AFib with heart rate around 124 beats per minute. Unfortunately she has to fly tomorrow afternoon for her daughter's wedding to [place] but she is in atrial fibrillation. She is little upset with whole situation. No nausea no vomiting no PND no orthopnea. She has echo and KATT done but no stress test was done. We do not have those records available. She did bring some records from the hospital which includes labs her TSH was acceptable. Her liver enzymes were elevated. And she had probably pleural effusion as mentioned in the discharge recommendation to her. She is not taking any diuretic at this time. 08/05/2022. Above reviewed. Patient came for follow-up she is doing well. She has no new symptoms but few weeks ago she may have brief episode of atrial fibrillation. She is very anxious and busy. But today she has sinus rhythm heart rate 63 beats per minute.   She has medical history significant for paroxysmal atrial fibrillation currently suppressed with flecainide, dyslipidemia on statin, possible sick sinus syndrome. No chest pain she does get exertional shortness of breath which is not changed. He is also compliant with her statins. Her flecainide level in July 21 was 0.49.      2/8/2023. Above reviewed. Patient came for follow-up she is doing well. She is doing now much better as she has retired. She had a history of paroxysmal atrial fibrillation currently in sinus suppressed with flecainide, dyslipidemia on statin with recently increased dose, sick sinus syndrome who came for follow-up. She denies any chest pain any shortness of breath. She has no other cardiovascular issues blood test done October 2022 shows LDL was 140 other electrolytes were stable. EKG shows no change from previous EKG. Heart rate is 62 bpm.  Her flecainide level was checked on 10/31/2022 it was 0.49.    7/25/2023. Above reviewed. Patient called today as she was not feeling well she was feeling fatigue and tired and shortness of breath. She noted that her pulse rate was irregular she came for EKG event found to be in atrial fibrillation with controlled ventricular rate. She feels more fatigue and tired. She is going on vacation. She does have history of sick sinus syndrome, atrial fibrillation s/p cardioversion. She has blood test done 3/7/2023 which shows her cholesterol is elevated with elevated LDL. Other labs were acceptable. Recently cholesterol medication was also adjusted. Previously flecainide level was acceptable. No nausea no vomiting no leg swelling no other issues. Review of Systems   Constitutional: Positive for fatigue. Negative for activity change, chills, diaphoresis, fever and unexpected weight change. HENT: Negative for congestion. Eyes: Negative for discharge and redness. Respiratory: Positive for shortness of breath.  Negative for cough, chest tightness and wheezing. Exertional   Cardiovascular: Negative. Negative for chest pain, palpitations and leg swelling. Gastrointestinal: Negative for abdominal pain, diarrhea and nausea. Endocrine: Negative. Genitourinary: Negative for decreased urine volume and urgency. Musculoskeletal: Negative. Negative for arthralgias, back pain and gait problem. Skin: Negative for rash and wound. Allergic/Immunologic: Negative. Neurological: Negative for dizziness, seizures, syncope, weakness, light-headedness and headaches. Hematological: Negative. Psychiatric/Behavioral: Negative for agitation and confusion. The patient is nervous/anxious.         Historical Information   Past Medical History:   Diagnosis Date   • Atrial fibrillation ([address])    • Hypertension    • Mitral valve prolapse    • Obesity     last assessed 4/27/2017     Past Surgical History:   Procedure Laterality Date   • CATARACT EXTRACTION      B/L   • HEMORRHOID SURGERY       Social History     Substance and Sexual Activity   Alcohol Use Yes   • Alcohol/week: 3.0 standard drinks of alcohol   • Types: 3 Glasses of wine per week     Social History     Substance and Sexual Activity   Drug Use Never     Social History     Tobacco Use   Smoking Status Never   Smokeless Tobacco Never     Family History:   Family History   Problem Relation Age of Onset   • Hyperlipidemia Mother    • Hypertension Mother    • No Known Problems Father    • No Known Problems Sister    • No Known Problems Daughter    • No Known Problems Maternal Grandmother    • No Known Problems Paternal Grandmother    • No Known Problems Sister    • No Known Problems Daughter    • Pancreatic cancer Maternal Aunt 76   • No Known Problems Maternal Aunt    • No Known Problems Maternal Aunt    • No Known Problems Maternal Aunt    • No Known Problems Paternal Aunt    • No Known Problems Paternal Aunt    • No Known Problems Paternal Aunt        Meds/Allergies     No Known Allergies    Current Outpatient Medications:   •  amLODIPine (NORVASC) 5 mg tablet, Take 1 tablet (5 mg total) by mouth daily, Disp: 90 tablet, Rfl: 2  •  atorvastatin (LIPITOR) 20 mg tablet, Take 1 tablet (20 mg total) by mouth daily, Disp: 90 tablet, Rfl: 1  •  brimonidine tartrate 0.2 % ophthalmic solution, , Disp: , Rfl:   •  Eliquis 5 MG, Take 1 tablet (5 mg total) by mouth 2 (two) times a day, Disp: 180 tablet, Rfl: 3  •  flecainide (TAMBOCOR) 100 mg tablet, Take 1 tablet (100 mg total) by mouth 2 (two) times a day, Disp: 180 tablet, Rfl: 2  •  latanoprost (XALATAN) 0.005 % ophthalmic solution, , Disp: , Rfl:   •  metoprolol tartrate (LOPRESSOR) 25 mg tablet, Take 1 tablet (25 mg total) by mouth every 12 (twelve) hours, Disp: 180 tablet, Rfl: 3    Vitals: Blood pressure 122/64, pulse 81, height 5' 6" (1.676 m), weight 88 kg (194 lb), SpO2 98 %. ?  Body mass index is 31.31 kg/m². Vitals:    07/25/23 1110   Weight: 88 kg (194 lb)     BP Readings from Last 3 Encounters:   07/25/23 122/64   07/14/23 130/70   03/09/23 130/70         Physical Exam  Constitutional:       General: She is not in acute distress. Appearance: She is well-developed. She is not diaphoretic. Neck:      Thyroid: No thyromegaly. Vascular: No JVD. Cardiovascular:      Rate and Rhythm: Normal rate. Rhythm irregularly irregular. Heart sounds: S1 normal and S2 normal. Heart sounds not distant. Murmur heard. Systolic murmur is present. No friction rub. No gallop. No S3 or S4 sounds. Pulmonary:      Effort: Pulmonary effort is normal. No respiratory distress. Breath sounds: Normal breath sounds. No wheezing or rales. Chest:      Chest wall: No tenderness. Abdominal:      General: There is no distension. Palpations: Abdomen is soft. Tenderness: There is no abdominal tenderness. Musculoskeletal:         General: No deformity. Cervical back: Neck supple.    Lymphadenopathy:      Cervical: No cervical adenopathy. Skin:     General: Skin is warm and dry. Coloration: Skin is not pale. Findings: No rash. Neurological:      Mental Status: She is alert and oriented to person, place, and time. Psychiatric:         Judgment: Judgment normal.           Diagnostic Studies Review Cardio:  Echo stress test reviewed    EKG:      Twelve lead EKG done in our office on 04/23/2019 shows atrial fibrillation with rapid ventricular rate heart rate 124 beats per minute. Twelve lead EKG done in our office 05/17/2019 shows normal sinus rhythm heart rate 58 beats per minute. Left atrial enlargement. Incomplete RBBB. As compared to old EKG patient is now in sinus rhythm. Twelve lead EKG done 09/24/2019 shows normal sinus rhythm RSR pattern heart rate 78 beats per minute no significant change from old EKG patient is staying in sinus rhythm. Twelve lead EKG shows atrial fibrillation with heart rate 139 beats per minute. As compared to previous EKG she is now in atrial fibrillation. Twelve lead EKG 03/22/2021 shows sinus rhythm heart rate 59 beats per minute incomplete RBBB. No change from previous EKG QS in V1 to V3 most likely due to lead location. 12 lead EKG done 10/01/2021 shows normal sinus rhythm heart rate 54 beats per minute. Incomplete RBBB. no change from previous EKG. Twelve lead EKG 08/05/2022 shows sinus rhythm  milliseconds. Left axis deviation. Incomplete RBBB note significant change from previous EKG. Heart rate is 63 beats per minute. Twelve-lead EKG done 2/8/2023 shows sinus rhythm left is deviation incomplete RBBB.   No other significant normality heart rate 62 bpm.  QTc 470 ms not changed from previous EKG    Twelve-lead EKG done 4/25/2023 shows atrial fibrillation heart rate 81 bpm.  Compared to previous EKG and February 2023 patient is now in atrial fibrillation      Lab Review     BMP:  Lab Results   Component Value Date    SODIUM 141 03/07/2023    K 3.9 03/07/2023     03/07/2023    CO2 25 03/07/2023    BUN 17 03/07/2023    CREATININE 0.86 03/07/2023    GLUC 102 (H) 03/07/2023    CALCIUM 9.0 01/20/2020    EGFR 74 03/07/2023     LFT:  Lab Results   Component Value Date    AST 23 03/07/2023    ALT 33 (H) 03/07/2023    ALKPHOS 90 01/20/2020    TP 6.6 03/07/2023    ALB 4.5 03/07/2023      No results found for: "[id number]"  Lab Results   Component Value Date    HGBA1C 5.5 03/07/2023     Lipid Profile:   Lab Results   Component Value Date    CHOLESTEROL 216 (H) 03/07/2023    HDL 60 03/07/2023    LDLCALC 140 (H) 03/07/2023    TRIG 92 03/07/2023     Lab Results   Component Value Date    CHOLESTEROL 216 (H) 03/07/2023    CHOLESTEROL 226 (H) 10/31/2022       Flecainide level in July 2021 0.49    Dr. Karime Ayala MD [facility]      "This note has been constructed using a voice recognition system. Therefore there may be syntax, spelling, and/or grammatical errors.  Please call if you have any questions. "

## 2023-07-25 NOTE — TELEPHONE ENCOUNTER
Patient is scheduled for cardioversion on 7/31/23 at [facility]. Ordered & performed by Dr. Catrachita Schneider. She has Medicare & [employer].

## 2023-07-25 NOTE — PROGRESS NOTES
Pt came in for EKG, states she is feeling skipped beats, states symptoms started Friday July 21. Denies chest pain. States she is feeling fatigued and SOB.

## 2023-07-31 ENCOUNTER — ANESTHESIA EVENT (OUTPATIENT)
Dept: NON INVASIVE DIAGNOSTICS | Facility: HOSPITAL | Age: 68
End: 2023-07-31

## 2023-07-31 ENCOUNTER — HOSPITAL ENCOUNTER (OUTPATIENT)
Dept: NON INVASIVE DIAGNOSTICS | Facility: HOSPITAL | Age: 68
Discharge: HOME | End: 2023-07-31
Attending: INTERNAL MEDICINE | Admitting: INTERNAL MEDICINE
Payer: MEDICARE

## 2023-07-31 ENCOUNTER — ANESTHESIA (OUTPATIENT)
Dept: NON INVASIVE DIAGNOSTICS | Facility: HOSPITAL | Age: 68
End: 2023-07-31

## 2023-07-31 VITALS
DIASTOLIC BLOOD PRESSURE: 80 MMHG | RESPIRATION RATE: 22 BRPM | SYSTOLIC BLOOD PRESSURE: 120 MMHG | HEART RATE: 63 BPM | OXYGEN SATURATION: 96 %

## 2023-07-31 DIAGNOSIS — I10 BENIGN ESSENTIAL HYPERTENSION: ICD-10-CM

## 2023-07-31 DIAGNOSIS — R06.02 EXERTIONAL SHORTNESS OF BREATH: ICD-10-CM

## 2023-07-31 DIAGNOSIS — E78.5 DYSLIPIDEMIA: ICD-10-CM

## 2023-07-31 DIAGNOSIS — R00.2 PALPITATION: ICD-10-CM

## 2023-07-31 DIAGNOSIS — I48.0 PAF (PAROXYSMAL ATRIAL FIBRILLATION) (HCC): ICD-10-CM

## 2023-07-31 DIAGNOSIS — E66.9 OBESITY (BMI 30-39.9): ICD-10-CM

## 2023-07-31 PROCEDURE — 92960 CARDIOVERSION ELECTRIC EXT: CPT | Performed by: INTERNAL MEDICINE

## 2023-07-31 PROCEDURE — 92960 CARDIOVERSION ELECTRIC EXT: CPT

## 2023-07-31 RX ORDER — LIDOCAINE HYDROCHLORIDE 10 MG/ML
INJECTION, SOLUTION EPIDURAL; INFILTRATION; INTRACAUDAL; PERINEURAL AS NEEDED
Status: DISCONTINUED | OUTPATIENT
Start: 2023-07-31 | End: 2023-07-31

## 2023-07-31 RX ORDER — PROPOFOL 10 MG/ML
INJECTION, EMULSION INTRAVENOUS AS NEEDED
Status: DISCONTINUED | OUTPATIENT
Start: 2023-07-31 | End: 2023-07-31

## 2023-07-31 RX ORDER — SODIUM CHLORIDE 9 MG/ML
INJECTION, SOLUTION INTRAVENOUS CONTINUOUS PRN
Status: DISCONTINUED | OUTPATIENT
Start: 2023-07-31 | End: 2023-07-31

## 2023-07-31 RX ADMIN — SODIUM CHLORIDE: 9 INJECTION, SOLUTION INTRAVENOUS at 10:01

## 2023-07-31 RX ADMIN — LIDOCAINE HYDROCHLORIDE 40 MG: 10 INJECTION, SOLUTION EPIDURAL; INFILTRATION; INTRACAUDAL; PERINEURAL at 10:04

## 2023-07-31 RX ADMIN — PROPOFOL 60 MG: 10 INJECTION, EMULSION INTRAVENOUS at 10:04

## 2023-07-31 NOTE — ANESTHESIA POSTPROCEDURE EVALUATION
Post-Op Assessment Note    CV Status:  Stable  Pain Score: 0    Pain management: adequate     Mental Status:  Awake   Hydration Status:  Stable   PONV Controlled:  None   Airway Patency:  Patent      Post Op Vitals Reviewed: Yes      Staff: CRNA   Comments: spontaneously breathing, protecting airway, HOB @ 30 degrees, vss, fully endorsed to recovery w/o AC        No notable events documented.     BP   126/79   Temp      /Pulse  61   Resp   12   SpO2   99

## 2023-07-31 NOTE — ANESTHESIA PREPROCEDURE EVALUATION
Procedure:  CARDIOVERSION    Relevant Problems   CARDIO   (+) Benign essential hypertension   (+) Hyperlipidemia LDL goal <130   (+) Mitral valve disorder   (+) PAF (paroxysmal atrial fibrillation) (HCC)      Other   (+) Prediabetes        Physical Exam    Airway    Mallampati score: II  TM Distance: >3 FB  Neck ROM: full     Dental       Cardiovascular  Rhythm: irregular, Rate: normal,     Pulmonary  Breath sounds clear to auscultation,     Other Findings        Anesthesia Plan  ASA Score- 3     Anesthesia Type- IV sedation with anesthesia with ASA Monitors. Additional Monitors:   Airway Plan:           Plan Factors-    Chart reviewed. Patient is not a current smoker. Induction- intravenous. Postoperative Plan-     Informed Consent- Anesthetic plan and risks discussed with patient. I personally reviewed this patient with the CRNA. Discussed and agreed on the Anesthesia Plan with the CRNA. Kathryn Taylor

## 2023-07-31 NOTE — DISCHARGE INSTRUCTIONS
Cardioversion     WHAT YOU SHOULD KNOW:     Cardioversion is a procedure to correct arrhythmias, which is when your heart beats too fast or irregularly. Arrhythmias may prevent your body from getting the blood and oxygen it needs. Cardioversion delivers a shock of electricity to your heart to help it return to its normal rhythm. AFTER YOU LEAVE:     Medicines: Make sure you continue to take your medicines as directed (blood thinner, anti-arrhythmic drugs). Driving: No driving for 24 hours because you received sedation. Sedation: Avoid any alcohol today - anesthesia may heighten the effects of the alcohol. Avoid making any legal/major decisions today, as the effects of the anesthesia may impair your judgement. Follow up with your cardiologist as directed: Write down your questions so you remember to ask them during your visits. Contact your cardiologist if:  You have new or worsening weakness or tiredness. You have questions or concerns about your condition or care. Seek care immediately or call 911 if: You feel like your heart is fluttering or jumping in your chest.   You feel lightheaded or you have fainted. You have chest pain when you take a deep breath or cough. You may cough up blood. You have discomfort in your chest that feels like squeezing, pressure, fullness, or pain. You have pain or discomfort in your back, neck, jaw, stomach, or arm. You have weakness or numbness in part of your body. You have sudden trouble breathing. You become confused or have difficulty speaking. You have dizziness, a severe headache, or vision loss.

## 2023-07-31 NOTE — SEDATION DOCUMENTATION
Procedure ended. Cardioversion completed by Dr. Wil Bishop. Patient successfully cardioversion to sinus rhythm. AVS given to patient.  Patient discharged from cath lab in stable condition

## 2023-08-29 DIAGNOSIS — E78.5 HYPERLIPIDEMIA LDL GOAL <130: ICD-10-CM

## 2023-08-30 RX ORDER — ATORVASTATIN CALCIUM 20 MG/1
20 TABLET, FILM COATED ORAL DAILY
Qty: 90 TABLET | Refills: 3 | Status: SHIPPED | OUTPATIENT
Start: 2023-08-30

## 2023-09-13 ENCOUNTER — TELEPHONE (OUTPATIENT)
Dept: CARDIOLOGY CLINIC | Facility: CLINIC | Age: 68
End: 2023-09-13

## 2023-09-13 NOTE — TELEPHONE ENCOUNTER
Kelly Mejia, [phone] Date of birth [birth year]. I'm a patient of Doctor Magalie Whittington. Thank you.

## 2023-09-22 ENCOUNTER — OFFICE VISIT (OUTPATIENT)
Dept: CARDIOLOGY CLINIC | Facility: CLINIC | Age: 68
End: 2023-09-22
Payer: MEDICARE

## 2023-09-22 VITALS
OXYGEN SATURATION: 95 % | HEART RATE: 52 BPM | SYSTOLIC BLOOD PRESSURE: 140 MMHG | HEIGHT: 66 IN | DIASTOLIC BLOOD PRESSURE: 80 MMHG | WEIGHT: 195 LBS | BODY MASS INDEX: 31.34 KG/M2

## 2023-09-22 DIAGNOSIS — I48.0 PAROXYSMAL ATRIAL FIBRILLATION (HCC): ICD-10-CM

## 2023-09-22 DIAGNOSIS — R06.02 EXERTIONAL SHORTNESS OF BREATH: ICD-10-CM

## 2023-09-22 DIAGNOSIS — I10 BENIGN ESSENTIAL HYPERTENSION: ICD-10-CM

## 2023-09-22 DIAGNOSIS — E78.5 DYSLIPIDEMIA: ICD-10-CM

## 2023-09-22 DIAGNOSIS — R00.2 PALPITATION: ICD-10-CM

## 2023-09-22 DIAGNOSIS — E66.9 OBESITY (BMI 30-39.9): ICD-10-CM

## 2023-09-22 DIAGNOSIS — I48.0 PAF (PAROXYSMAL ATRIAL FIBRILLATION) (HCC): ICD-10-CM

## 2023-09-22 PROCEDURE — 93242 EXT ECG>48HR<7D RECORDING: CPT | Performed by: INTERNAL MEDICINE

## 2023-09-22 PROCEDURE — 99214 OFFICE O/P EST MOD 30 MIN: CPT | Performed by: INTERNAL MEDICINE

## 2023-09-22 PROCEDURE — 93000 ELECTROCARDIOGRAM COMPLETE: CPT | Performed by: INTERNAL MEDICINE

## 2023-09-22 NOTE — PROGRESS NOTES
Consultation - Cardiology Office  [facility]. Lela Sylvester 70 y.o. female MRN: [id number]  : 1955  Unit/Bed#:  Encounter: [id number]      Assessment:     1. Exertional shortness of breath    2. PAF (paroxysmal atrial fibrillation) ([address])    3. Benign essential hypertension    4. Dyslipidemia    5. Obesity (BMI 30-39.9)    6. Palpitation    7. Paroxysmal atrial fibrillation (HCC)        Discussion summary and Plan:      1. Paroxysmal atrial fibrillation. Patient was recently cardioverted. She is maintaining sinus rhythm. She is bradycardic. Heart rate is 52 bpm.  We will cut back metoprolol to 12.5 twice a day. Check flecainide level continue antithrombotic therapy. We have discussed with her the option of A-fib ablation if A-fib reoccurs she agrees with that plan. We will check routine lab of CMP CBCD and TSH. 2. Exertion shortness of breath. He feels much better since she has been cardioverted no evidence of any significant shortness of breath with normal activities. 3. Benign essential hypertension. Blood pressure acceptable with current therapy. She is on metoprolol and amlodipine. Pressure has been acceptable. 4. History of mitral valve disorder. No evidence of any significant mitral valve disease. Echo reviewed other issues. 5. History of dyslipidemia. Cholesterol profile not at goal.  Lipitor was increased to 20 mg check fasting lipids and LFTs based on that will further adjusted. 6. History of impaired glucose metabolism his blood glucose 102. Lytes recent labs from  reviewed. 7. Class 1 obesity with BMI 31. Advised her to lose weight. Metoprolol decreased to 12.5 twice a day. Continue same dose of flexion and other medications. Check routine lab of CMP CBCD fasting lipid and TSH. Further plans also of these test become available. Thank you for your consultation.   If he have any question please call me at [phone]    Counseling :  A description of the counseling. Goals and Barriers. Patient's ability to self care: Yes  Medication side effect reviewed with patient in detail and all their questions answered to their satisfaction. Primary Care Physician: Sharon Lyman DO      HPI :     Dai Louis is a 70y.o. year old female who   Self-referred as she was in [facility] in the hospital with atrial fibrillation with rapid ventricular rate. Patient was at a function in [facility] when next the morning she found that she could not breathe she was taken to local hospital where she was found to be in AFib with rapid ventricular rate. 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 As per patient she has a KATT guided cardioversion and she was given IV diuretics as well as Cardizem and started on Eliquis. She flew back Sunday and she has been doing well since yesterday she started feeling little lightheaded and fatigue and tired and today she came to see us in she is found to be in AFib with heart rate around 124 beats per minute. Unfortunately she has to fly tomorrow afternoon for her daughter's wedding to [place] but she is in atrial fibrillation. She is little upset with whole situation. No nausea no vomiting no PND no orthopnea. She has echo and KATT done but no stress test was done. We do not have those records available. She did bring some records from the hospital which includes labs her TSH was acceptable. Her liver enzymes were elevated. And she had probably pleural effusion as mentioned in the discharge recommendation to her. She is not taking any diuretic at this time. 08/05/2022. Above reviewed. Patient came for follow-up she is doing well. She has no new symptoms but few weeks ago she may have brief episode of atrial fibrillation. She is very anxious and busy. But today she has sinus rhythm heart rate 63 beats per minute.   She has medical history significant for paroxysmal atrial fibrillation currently suppressed with flecainide, dyslipidemia on statin, possible sick sinus syndrome. No chest pain she does get exertional shortness of breath which is not changed. He is also compliant with her statins. Her flecainide level in July 21 was 0.49.      2/8/2023. Above reviewed. Patient came for follow-up she is doing well. She is doing now much better as she has retired. She had a history of paroxysmal atrial fibrillation currently in sinus suppressed with flecainide, dyslipidemia on statin with recently increased dose, sick sinus syndrome who came for follow-up. She denies any chest pain any shortness of breath. She has no other cardiovascular issues blood test done October 2022 shows LDL was 140 other electrolytes were stable. EKG shows no change from previous EKG. Heart rate is 62 bpm.  Her flecainide level was checked on 10/31/2022 it was 0.49.    7/25/2023. Above reviewed. Patient called today as she was not feeling well she was feeling fatigue and tired and shortness of breath. She noted that her pulse rate was irregular she came for EKG event found to be in atrial fibrillation with controlled ventricular rate. She feels more fatigue and tired. She is going on vacation. She does have history of sick sinus syndrome, atrial fibrillation s/p cardioversion. She has blood test done 3/7/2023 which shows her cholesterol is elevated with elevated LDL. Other labs were acceptable. Recently cholesterol medication was also adjusted. Previously flecainide level was acceptable. No nausea no vomiting no leg swelling no other issues. 9/22/2023. Above reviewed. Patient came for follow-up. She is feeling better no more episodes of irregular heartbeat noted. She has her daughter's wedding done without any issues.   Today she is sinus with heart rate 52 bpm.  She had history of atrial fibrillation she has been on flecainide and metoprolol. She has not done her flecainide and has a blood test which will be ordered no other cardiovascular issues. Review of Systems   Constitutional: Negative for activity change, chills, diaphoresis, fever and unexpected weight change. HENT: Negative for congestion. Eyes: Negative for discharge and redness. Respiratory: Negative for cough, chest tightness, shortness of breath and wheezing. Cardiovascular: Negative. Negative for chest pain, palpitations and leg swelling. Gastrointestinal: Negative for abdominal pain, diarrhea and nausea. Endocrine: Negative. Genitourinary: Negative for decreased urine volume and urgency. Musculoskeletal: Negative. Negative for arthralgias, back pain and gait problem. Skin: Negative for rash and wound. Allergic/Immunologic: Negative. Neurological: Negative for dizziness, seizures, syncope, weakness, light-headedness and headaches. Hematological: Negative. Psychiatric/Behavioral: Negative for agitation and confusion. The patient is not nervous/anxious.         Historical Information   Past Medical History:   Diagnosis Date   • Atrial fibrillation ([address])    • Hypertension    • Mitral valve prolapse    • Obesity     last assessed 4/27/2017     Past Surgical History:   Procedure Laterality Date   • CATARACT EXTRACTION      B/L   • HEMORRHOID SURGERY       Social History     Substance and Sexual Activity   Alcohol Use Yes   • Alcohol/week: 3.0 standard drinks of alcohol   • Types: 3 Glasses of wine per week     Social History     Substance and Sexual Activity   Drug Use Never     Social History     Tobacco Use   Smoking Status Never   Smokeless Tobacco Never     Family History:   Family History   Problem Relation Age of Onset   • Hyperlipidemia Mother    • Hypertension Mother    • No Known Problems Father    • No Known Problems Sister    • No Known Problems Daughter    • No Known Problems Maternal Grandmother    • No Known Problems Paternal Grandmother    • No Known Problems Sister    • No Known Problems Daughter    • Pancreatic cancer Maternal Aunt 76   • No Known Problems Maternal Aunt    • No Known Problems Maternal Aunt    • No Known Problems Maternal Aunt    • No Known Problems Paternal Aunt    • No Known Problems Paternal Aunt    • No Known Problems Paternal Aunt        Meds/Allergies     No Known Allergies    Current Outpatient Medications:   •  amLODIPine (NORVASC) 5 mg tablet, Take 1 tablet (5 mg total) by mouth daily, Disp: 90 tablet, Rfl: 2  •  atorvastatin (LIPITOR) 20 mg tablet, TAKE 1 TABLET BY MOUTH DAILY, Disp: 90 tablet, Rfl: 3  •  brimonidine tartrate 0.2 % ophthalmic solution, , Disp: , Rfl:   •  Eliquis 5 MG, Take 1 tablet (5 mg total) by mouth 2 (two) times a day, Disp: 180 tablet, Rfl: 3  •  flecainide (TAMBOCOR) 100 mg tablet, Take 1 tablet (100 mg total) by mouth 2 (two) times a day, Disp: 180 tablet, Rfl: 2  •  latanoprost (XALATAN) 0.005 % ophthalmic solution, , Disp: , Rfl:   •  metoprolol tartrate (LOPRESSOR) 25 mg tablet, Take 0.5 tablets (12.5 mg total) by mouth every 12 (twelve) hours, Disp: 90 tablet, Rfl: 3    Vitals: Blood pressure 140/80, pulse (!) 52, height 5' 6" (1.676 m), weight 88.5 kg (195 lb), SpO2 95 %. ?  Body mass index is 31.47 kg/m². Vitals:    09/22/23 1107   Weight: 88.5 kg (195 lb)     BP Readings from Last 3 Encounters:   09/22/23 140/80   07/31/23 120/80   07/25/23 122/64         Physical Exam  Constitutional:       General: She is not in acute distress. Appearance: She is well-developed. She is not diaphoretic. Neck:      Thyroid: No thyromegaly. Vascular: No JVD. Trachea: No tracheal deviation. Cardiovascular:      Rate and Rhythm: Normal rate and regular rhythm. Heart sounds: S1 normal and S2 normal. Heart sounds not distant. Murmur heard. Systolic (ejection) murmur is present with a grade of 2/6. No friction rub. No gallop. No S3 or S4 sounds.    Pulmonary: Effort: Pulmonary effort is normal. No respiratory distress. Breath sounds: Normal breath sounds. No wheezing or rales. Chest:      Chest wall: No tenderness. Abdominal:      General: Bowel sounds are normal. There is no distension. Palpations: Abdomen is soft. Tenderness: There is no abdominal tenderness. Musculoskeletal:         General: No deformity. Cervical back: Neck supple. Skin:     General: Skin is warm and dry. Coloration: Skin is not pale. Findings: No rash. Neurological:      Mental Status: She is alert and oriented to person, place, and time. Psychiatric:         Behavior: Behavior normal.         Judgment: Judgment normal.           Diagnostic Studies Review Cardio:  Echo stress test reviewed    EKG:      Twelve lead EKG done in our office on 04/23/2019 shows atrial fibrillation with rapid ventricular rate heart rate 124 beats per minute. Twelve lead EKG done in our office 05/17/2019 shows normal sinus rhythm heart rate 58 beats per minute. Left atrial enlargement. Incomplete RBBB. As compared to old EKG patient is now in sinus rhythm. Twelve lead EKG done 09/24/2019 shows normal sinus rhythm RSR pattern heart rate 78 beats per minute no significant change from old EKG patient is staying in sinus rhythm. Twelve lead EKG shows atrial fibrillation with heart rate 139 beats per minute. As compared to previous EKG she is now in atrial fibrillation. Twelve lead EKG 03/22/2021 shows sinus rhythm heart rate 59 beats per minute incomplete RBBB. No change from previous EKG QS in V1 to V3 most likely due to lead location. 12 lead EKG done 10/01/2021 shows normal sinus rhythm heart rate 54 beats per minute. Incomplete RBBB. no change from previous EKG. Twelve lead EKG 08/05/2022 shows sinus rhythm  milliseconds. Left axis deviation. Incomplete RBBB note significant change from previous EKG.   Heart rate is 63 beats per minute. Twelve-lead EKG done 2/8/2023 shows sinus rhythm left is deviation incomplete RBBB. No other significant normality heart rate 62 bpm.  QTc 470 ms not changed from previous EKG    Twelve-lead EKG done 4/25/2023 shows atrial fibrillation heart rate 81 bpm.  Compared to previous EKG and February 2023 patient is now in atrial fibrillation     Twelve-lead EKG done on 9/22/2023 shows sinus bradycardia heart rate 52 bpm no other significant abnormality. Lab Review     BMP:  Lab Results   Component Value Date    SODIUM 141 03/07/2023    K 3.9 03/07/2023     03/07/2023    CO2 25 03/07/2023    BUN 17 03/07/2023    CREATININE 0.86 03/07/2023    GLUC 102 (H) 03/07/2023    CALCIUM 9.0 01/20/2020    EGFR 74 03/07/2023     LFT:  Lab Results   Component Value Date    AST 23 03/07/2023    ALT 33 (H) 03/07/2023    ALKPHOS 90 01/20/2020    TP 6.6 03/07/2023    ALB 4.5 03/07/2023      No results found for: "[id number]"  Lab Results   Component Value Date    HGBA1C 5.5 03/07/2023     Lipid Profile:   Lab Results   Component Value Date    CHOLESTEROL 216 (H) 03/07/2023    HDL 60 03/07/2023    LDLCALC 140 (H) 03/07/2023    TRIG 92 03/07/2023     Lab Results   Component Value Date    CHOLESTEROL 216 (H) 03/07/2023    CHOLESTEROL 226 (H) 10/31/2022       Flecainide level in July 2021 0.49    Dr. Jason Vidales MD [facility] [place]      "This note has been constructed using a voice recognition system. Therefore there may be syntax, spelling, and/or grammatical errors.  Please call if you have any questions. "

## 2023-09-28 DIAGNOSIS — I48.0 PAROXYSMAL ATRIAL FIBRILLATION (HCC): ICD-10-CM

## 2023-09-28 RX ORDER — FLECAINIDE ACETATE 100 MG/1
100 TABLET ORAL 2 TIMES DAILY
Qty: 180 TABLET | Refills: 3 | Status: SHIPPED | OUTPATIENT
Start: 2023-09-28

## 2023-10-24 DIAGNOSIS — I10 BENIGN ESSENTIAL HYPERTENSION: ICD-10-CM

## 2023-10-25 RX ORDER — AMLODIPINE BESYLATE 5 MG/1
5 TABLET ORAL DAILY
Qty: 90 TABLET | Refills: 3 | Status: SHIPPED | OUTPATIENT
Start: 2023-10-25

## 2023-12-26 DIAGNOSIS — I48.0 PAROXYSMAL ATRIAL FIBRILLATION (HCC): ICD-10-CM

## 2023-12-27 RX ORDER — APIXABAN 5 MG/1
5 TABLET, FILM COATED ORAL 2 TIMES DAILY
Qty: 180 TABLET | Refills: 0 | Status: SHIPPED | OUTPATIENT
Start: 2023-12-27

## 2024-02-08 LAB
ALBUMIN SERPL-MCNC: 5 G/DL (ref 3.9–4.9)
ALBUMIN/GLOB SERPL: 2.5 {RATIO} (ref 1.2–2.2)
ALP SERPL-CCNC: 122 IU/L (ref 44–121)
ALT SERPL-CCNC: 34 IU/L (ref 0–32)
AST SERPL-CCNC: 26 IU/L (ref 0–40)
BILIRUB SERPL-MCNC: 0.4 MG/DL (ref 0–1.2)
BUN SERPL-MCNC: 12 MG/DL (ref 8–27)
BUN/CREAT SERPL: 14 (ref 12–28)
CALCIUM SERPL-MCNC: 9.7 MG/DL (ref 8.7–10.3)
CHLORIDE SERPL-SCNC: 104 MMOL/L (ref 96–106)
CHOLEST SERPL-MCNC: 211 MG/DL (ref 100–199)
CO2 SERPL-SCNC: 24 MMOL/L (ref 20–29)
CREAT SERPL-MCNC: 0.87 MG/DL (ref 0.57–1)
EGFR: 72 ML/MIN/1.73
GLOBULIN SER-MCNC: 2 G/DL (ref 1.5–4.5)
GLUCOSE SERPL-MCNC: 107 MG/DL (ref 70–99)
HBA1C MFR BLD: 5.6 % (ref 4.8–5.6)
HDLC SERPL-MCNC: 56 MG/DL
LDLC SERPL CALC-MCNC: 131 MG/DL (ref 0–99)
MICRODELETION SYND BLD/T FISH: NORMAL
POTASSIUM SERPL-SCNC: 4.3 MMOL/L (ref 3.5–5.2)
PROT SERPL-MCNC: 7 G/DL (ref 6–8.5)
SODIUM SERPL-SCNC: 144 MMOL/L (ref 134–144)
TRIGL SERPL-MCNC: 135 MG/DL (ref 0–149)

## 2024-02-21 PROBLEM — Z13.83 SCREENING FOR CARDIOVASCULAR, RESPIRATORY, AND GENITOURINARY DISEASES: Status: RESOLVED | Noted: 2020-03-10 | Resolved: 2024-02-21

## 2024-02-21 PROBLEM — Z13.1 SCREENING FOR DIABETES MELLITUS (DM): Status: RESOLVED | Noted: 2020-03-10 | Resolved: 2024-02-21

## 2024-02-21 PROBLEM — Z13.89 SCREENING FOR CARDIOVASCULAR, RESPIRATORY, AND GENITOURINARY DISEASES: Status: RESOLVED | Noted: 2020-03-10 | Resolved: 2024-02-21

## 2024-02-21 PROBLEM — Z13.6 SCREENING FOR CARDIOVASCULAR, RESPIRATORY, AND GENITOURINARY DISEASES: Status: RESOLVED | Noted: 2020-03-10 | Resolved: 2024-02-21

## 2024-02-23 ENCOUNTER — TELEPHONE (OUTPATIENT)
Dept: CARDIOLOGY CLINIC | Facility: CLINIC | Age: 69
End: 2024-02-23

## 2024-02-23 NOTE — TELEPHONE ENCOUNTER
----- Message from Helder Damon MD sent at 2/23/2024 12:17 PM EST -----  Her flecainide level is acceptable.

## 2024-02-28 DIAGNOSIS — I48.0 PAROXYSMAL ATRIAL FIBRILLATION (HCC): ICD-10-CM

## 2024-02-28 RX ORDER — APIXABAN 5 MG/1
5 TABLET, FILM COATED ORAL 2 TIMES DAILY
Qty: 180 TABLET | Refills: 0 | Status: SHIPPED | OUTPATIENT
Start: 2024-02-28

## 2024-04-18 DIAGNOSIS — Z12.31 BREAST CANCER SCREENING BY MAMMOGRAM: Primary | ICD-10-CM

## 2024-04-30 ENCOUNTER — TELEPHONE (OUTPATIENT)
Dept: GASTROENTEROLOGY | Facility: CLINIC | Age: 69
End: 2024-04-30

## 2024-04-30 DIAGNOSIS — I48.0 PAROXYSMAL ATRIAL FIBRILLATION (HCC): ICD-10-CM

## 2024-05-01 ENCOUNTER — TELEPHONE (OUTPATIENT)
Age: 69
End: 2024-05-01

## 2024-05-01 RX ORDER — APIXABAN 5 MG/1
5 TABLET, FILM COATED ORAL 2 TIMES DAILY
Qty: 180 TABLET | Refills: 0 | Status: SHIPPED | OUTPATIENT
Start: 2024-05-01

## 2024-05-01 NOTE — TELEPHONE ENCOUNTER
05/01/24  Screened by: Yoko Chau    Referring Provider Dr. Garcia    Pre- Screening:     There is no height or weight on file to calculate BMI.31.47  Has patient been referred for a routine screening Colonoscopy? yes  Is the patient between 45-75 years old? yes      Previous Colonoscopy yes   If yes:    Date:     Facility:     Reason:         Does the patient want to see a Gastroenterologist prior to their procedure OR are they having any GI symptoms? no    Has the patient been hospitalized or had abdominal surgery in the past 6 months? no    Does the patient use supplemental oxygen? no    Does the patient take Coumadin, Lovenox, Plavix, Elliquis, Xarelto, or other blood thinning medication? yes    Has the patient had a stroke, cardiac event, or stent placed in the past year? no        If patient is between 45yrs - 49yrs, please advise patient that we will have to confirm benefits & coverage with their insurance company for a routine screening colonoscopy.

## 2024-07-03 DIAGNOSIS — I48.0 PAROXYSMAL ATRIAL FIBRILLATION (HCC): ICD-10-CM

## 2024-07-05 RX ORDER — APIXABAN 5 MG/1
5 TABLET, FILM COATED ORAL 2 TIMES DAILY
Qty: 180 TABLET | Refills: 3 | Status: SHIPPED | OUTPATIENT
Start: 2024-07-05

## 2024-07-23 ENCOUNTER — OFFICE VISIT (OUTPATIENT)
Dept: CARDIOLOGY CLINIC | Facility: CLINIC | Age: 69
End: 2024-07-23
Payer: MEDICARE

## 2024-07-23 VITALS
DIASTOLIC BLOOD PRESSURE: 80 MMHG | BODY MASS INDEX: 31.18 KG/M2 | SYSTOLIC BLOOD PRESSURE: 110 MMHG | HEIGHT: 66 IN | HEART RATE: 64 BPM | OXYGEN SATURATION: 98 % | WEIGHT: 194 LBS

## 2024-07-23 DIAGNOSIS — I48.0 PAROXYSMAL ATRIAL FIBRILLATION (HCC): ICD-10-CM

## 2024-07-23 DIAGNOSIS — I11.9 HYPERTENSIVE HEART DISEASE WITHOUT HEART FAILURE: ICD-10-CM

## 2024-07-23 DIAGNOSIS — E66.9 OBESITY (BMI 30-39.9): ICD-10-CM

## 2024-07-23 DIAGNOSIS — R00.2 PALPITATION: ICD-10-CM

## 2024-07-23 DIAGNOSIS — E78.5 DYSLIPIDEMIA: ICD-10-CM

## 2024-07-23 DIAGNOSIS — R06.02 EXERTIONAL SHORTNESS OF BREATH: ICD-10-CM

## 2024-07-23 PROCEDURE — 93000 ELECTROCARDIOGRAM COMPLETE: CPT | Performed by: INTERNAL MEDICINE

## 2024-07-23 PROCEDURE — 99214 OFFICE O/P EST MOD 30 MIN: CPT | Performed by: INTERNAL MEDICINE

## 2024-07-23 RX ORDER — PREDNISOLONE ACETATE 10 MG/ML
1 SUSPENSION/ DROPS OPHTHALMIC 4 TIMES DAILY
COMMUNITY

## 2024-07-23 RX ORDER — BROMFENAC SODIUM 0.81 MG/ML
SOLUTION/ DROPS OPHTHALMIC
COMMUNITY

## 2024-07-23 RX ORDER — KETOROLAC TROMETHAMINE 5 MG/ML
1 SOLUTION OPHTHALMIC 4 TIMES DAILY
COMMUNITY

## 2024-07-23 NOTE — PROGRESS NOTES
Consultation - Cardiology Office  [facility].    Cookie Newby 69 y.o. female MRN: [id number]  : 1955  Unit/Bed#:  Encounter: [id number]      Assessment:     1. Paroxysmal atrial fibrillation (HCC)    2. Exertional shortness of breath    3. Hypertensive heart disease without heart failure    4. Dyslipidemia    5. Obesity (BMI 30-39.9)    6. Palpitation        Discussion summary and Plan:      1. Paroxysmal atrial fibrillation.  She is now maintaining sinus rhythm she is on flecainide 100 mg twice a day along with metoprolol 12.5 twice a day.  Heart rate 64 bpm.  Flecainide level is acceptable she is on Eliquis for antithrombotic therapy.      2. Exertion shortness of breath.  Stable not changed.  She is feeling much better since cardioversion.    3. Benign essential hypertension.  Blood pressure acceptable with current therapy.  She is on metoprolol and amlodipine.  Pressure has been acceptable.  Electrolyte test done 2024 acceptable.        4. History of mitral valve disorder.  No evidence of any significant mitral valve disease.  Echo reviewed other issues.      5. History of dyslipidemia.  Cholesterol is not at goal LFTs are acceptable she is on Lipitor 20 mg.  She will monitor her diet if not we may need to increase the dose to keep LDL around 100.     6. History of impaired glucose metabolism his blood glucose 102.  Lytes recent labs from  reviewed.    7. Class 1 obesity with BMI 31.  Advised her to lose weight.      Continue current Rx.  Will wait for the next blood test if LDL still more than 100 willing to consider increasing Lipitor or adding Zetia 10 mg.    Follow-up 6 months.  Thank you for your consultation.  If he have any question please call me at [phone]    Counseling :  A description of the counseling.  Goals and Barriers.  Patient's ability to self care: Yes  Medication side effect reviewed with patient in detail and all their questions answered to  their satisfaction.      Primary Care Physician: Ankit Garcia DO      HPI :     Cookie Newby is a 69 y.o. year old female who   Self-referred as she was in [place] in the hospital with atrial fibrillation with rapid ventricular rate.  Patient was at a function in [place] when next the morning she found that she could not breathe she was taken to local hospital where she was found to be in AFib with rapid ventricular rate.  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  As per patient she has a KATT guided cardioversion and she was given IV diuretics as well as Cardizem and started on Eliquis.  She flew back Sunday and she has been doing well since yesterday she started feeling little lightheaded and fatigue and tired and today she came to see us in she is found to be in AFib with heart rate around 124 beats per minute.  Unfortunately she has to fly tomorrow afternoon for her daughter's wedding to [place] but she is in atrial fibrillation. She is little upset with whole situation.  No nausea no vomiting no PND no orthopnea.  She has echo and KATT done but no stress test was done.  We do not have those records available.  She did bring some records from the hospital which includes labs her TSH was acceptable.  Her liver enzymes were elevated.  And she had probably pleural effusion as mentioned in the discharge recommendation to her.  She is not taking any diuretic at this time.      08/05/2022.      Above reviewed.  Patient came for follow-up she is doing well.  She has no new symptoms but few weeks ago she may have brief episode of atrial fibrillation.  She is very anxious and busy.  But today she has sinus rhythm heart rate 63 beats per minute.  She has medical history significant for paroxysmal atrial fibrillation currently suppressed with flecainide, dyslipidemia on statin, possible sick sinus syndrome.  No chest pain she does get exertional shortness of  breath which is not changed.  He is also compliant with her statins.  Her flecainide level in July 21 was 0.49.      2/8/2023.    Above reviewed.  Patient came for follow-up she is doing well.  She is doing now much better as she has retired.  She had a history of paroxysmal atrial fibrillation currently in sinus suppressed with flecainide, dyslipidemia on statin with recently increased dose, sick sinus syndrome who came for follow-up.  She denies any chest pain any shortness of breath.  She has no other cardiovascular issues blood test done October 2022 shows LDL was 140 other electrolytes were stable.  EKG shows no change from previous EKG.  Heart rate is 62 bpm.  Her flecainide level was checked on 10/31/2022 it was 0.49.    7/25/2023.    Above reviewed.  Patient called today as she was not feeling well she was feeling fatigue and tired and shortness of breath.  She noted that her pulse rate was irregular she came for EKG event found to be in atrial fibrillation with controlled ventricular rate.  She feels more fatigue and tired.  She is going on vacation.  She does have history of sick sinus syndrome, atrial fibrillation s/p cardioversion.  She has blood test done 3/7/2023 which shows her cholesterol is elevated with elevated LDL.  Other labs were acceptable.  Recently cholesterol medication was also adjusted.  Previously flecainide level was acceptable.  No nausea no vomiting no leg swelling no other issues.    9/22/2023.    Above reviewed.  Patient came for follow-up.  She is feeling better no more episodes of irregular heartbeat noted.  She has her daughter's wedding done without any issues.  Today she is sinus with heart rate 52 bpm.  She had history of atrial fibrillation she has been on flecainide and metoprolol.  She has not done her flecainide and has a blood test which will be ordered no other cardiovascular issues.    7/23/2024.    Above reviewed.  Patient came for follow-up she is doing better from  cardiac point of view.  Today EKG shows sinus some heart rate 64 bpm.  She is having issues with her I may need injections but she is following with ophthalmologist.  No cardiovascular issues at this time blood test done in February shows flecainide level 0.61 other labs were acceptable.  Cholesterol LDL still around 120 she is on Lipitor 20 mg.  She has been on vacation she will work on her diet.  No new issues.  Some exertional shortness of breath which is due to hemodynamically better which is not changed also.          Review of Systems   Constitutional:  Negative for activity change, chills, diaphoresis, fever and unexpected weight change.   HENT:  Negative for congestion.    Eyes:  Negative for discharge and redness.   Respiratory:  Negative for cough, chest tightness, shortness of breath and wheezing.    Cardiovascular: Negative.  Negative for chest pain, palpitations and leg swelling.   Gastrointestinal:  Negative for abdominal pain, diarrhea and nausea.   Endocrine: Negative.    Genitourinary:  Negative for decreased urine volume and urgency.   Musculoskeletal: Negative.  Negative for arthralgias, back pain and gait problem.   Skin:  Negative for rash and wound.   Allergic/Immunologic: Negative.    Neurological:  Negative for dizziness, seizures, syncope, weakness, light-headedness and headaches.   Hematological: Negative.    Psychiatric/Behavioral:  Negative for agitation and confusion. The patient is not nervous/anxious.        Historical Information   Past Medical History:   Diagnosis Date   • Atrial fibrillation (HCC)    • Hypertension    • Mitral valve prolapse    • Obesity     last assessed 4/27/2017     Past Surgical History:   Procedure Laterality Date   • CATARACT EXTRACTION      B/L   • HEMORRHOID SURGERY       Social History     Substance and Sexual Activity   Alcohol Use Yes   • Alcohol/week: 3.0 standard drinks of alcohol   • Types: 3 Glasses of wine per week     Social History     Substance  "and Sexual Activity   Drug Use Never     Social History     Tobacco Use   Smoking Status Former   • Types: Cigarettes   Smokeless Tobacco Never     Family History:   Family History   Problem Relation Age of Onset   • Hyperlipidemia Mother    • Hypertension Mother    • No Known Problems Father    • No Known Problems Sister    • No Known Problems Daughter    • No Known Problems Maternal Grandmother    • No Known Problems Paternal Grandmother    • No Known Problems Sister    • No Known Problems Daughter    • Pancreatic cancer Maternal Aunt 75   • No Known Problems Maternal Aunt    • No Known Problems Maternal Aunt    • No Known Problems Maternal Aunt    • No Known Problems Paternal Aunt    • No Known Problems Paternal Aunt    • No Known Problems Paternal Aunt        Meds/Allergies     No Known Allergies    Current Outpatient Medications:   •  amLODIPine (NORVASC) 5 mg tablet, TAKE 1 TABLET BY MOUTH DAILY, Disp: 90 tablet, Rfl: 3  •  atorvastatin (LIPITOR) 20 mg tablet, TAKE 1 TABLET BY MOUTH DAILY, Disp: 90 tablet, Rfl: 3  •  brimonidine tartrate 0.2 % ophthalmic solution, , Disp: , Rfl:   •  bromfenac sodium (Prolensa) 0.07 % SOLN, Apply to eye, Disp: , Rfl:   •  Eliquis 5 MG, TAKE 1 TABLET BY MOUTH TWICE  DAILY, Disp: 180 tablet, Rfl: 3  •  flecainide (TAMBOCOR) 100 mg tablet, TAKE 1 TABLET BY MOUTH TWICE  DAILY, Disp: 180 tablet, Rfl: 3  •  ketorolac (ACULAR) 0.5 % ophthalmic solution, 1 drop 4 (four) times a day, Disp: , Rfl:   •  latanoprost (XALATAN) 0.005 % ophthalmic solution, , Disp: , Rfl:   •  metoprolol tartrate (LOPRESSOR) 25 mg tablet, Take 0.5 tablets (12.5 mg total) by mouth every 12 (twelve) hours, Disp: 90 tablet, Rfl: 3  •  prednisoLONE acetate (PRED FORTE) 1 % ophthalmic suspension, 1 drop 4 (four) times a day, Disp: , Rfl:     Vitals: Blood pressure 110/80, pulse 64, height 5' 6\" (1.676 m), weight 88 kg (194 lb), SpO2 98%.    Body mass index is 31.31 kg/m².  Vitals:    07/23/24 1027   Weight: 88 kg " (194 lb)     BP Readings from Last 3 Encounters:   07/23/24 110/80   09/22/23 140/80   07/31/23 120/80         Physical Exam  Constitutional:       General: She is not in acute distress.     Appearance: She is well-developed. She is not diaphoretic.   Neck:      Thyroid: No thyromegaly.      Vascular: No JVD.      Trachea: No tracheal deviation.   Cardiovascular:      Rate and Rhythm: Normal rate and regular rhythm.      Heart sounds: S1 normal and S2 normal. Heart sounds not distant. Murmur heard.      Systolic (ejection) murmur is present with a grade of 2/6.      No friction rub. No gallop. No S3 or S4 sounds.   Pulmonary:      Effort: Pulmonary effort is normal. No respiratory distress.      Breath sounds: Normal breath sounds. No wheezing or rales.   Chest:      Chest wall: No tenderness.   Abdominal:      General: Bowel sounds are normal. There is no distension.      Palpations: Abdomen is soft.      Tenderness: There is no abdominal tenderness.   Musculoskeletal:         General: No deformity.      Cervical back: Neck supple.   Skin:     General: Skin is warm and dry.      Coloration: Skin is not pale.      Findings: No rash.   Neurological:      Mental Status: She is alert and oriented to person, place, and time.   Psychiatric:         Behavior: Behavior normal.         Judgment: Judgment normal.           Diagnostic Studies Review Cardio:  Echo stress test reviewed    EKG:      Twelve lead EKG done in our office on 04/23/2019 shows atrial fibrillation with rapid ventricular rate heart rate 124 beats per minute.    Twelve lead EKG done in our office 05/17/2019 shows normal sinus rhythm heart rate 58 beats per minute.  Left atrial enlargement.  Incomplete RBBB.  As compared to old EKG patient is now in sinus rhythm.    Twelve lead EKG done 09/24/2019 shows normal sinus rhythm RSR pattern heart rate 78 beats per minute no significant change from old EKG patient is staying in sinus rhythm.    Twelve lead EKG  "shows atrial fibrillation with heart rate 139 beats per minute.  As compared to previous EKG she is now in atrial fibrillation.     Twelve lead EKG 03/22/2021 shows sinus rhythm heart rate 59 beats per minute incomplete RBBB.  No change from previous EKG QS in V1 to V3 most likely due to lead location.     12 lead EKG done 10/01/2021 shows normal sinus rhythm heart rate 54 beats per minute.  Incomplete RBBB.  no change from previous EKG.      Twelve lead EKG 08/05/2022 shows sinus rhythm  milliseconds.  Left axis deviation.  Incomplete RBBB note significant change from previous EKG.  Heart rate is 63 beats per minute.    Twelve-lead EKG done 2/8/2023 shows sinus rhythm left is deviation incomplete RBBB.  No other significant normality heart rate 62 bpm.  QTc 470 ms not changed from previous EKG    Twelve-lead EKG done 4/25/2023 shows atrial fibrillation heart rate 81 bpm.  Compared to previous EKG and February 2023 patient is now in atrial fibrillation     Twelve-lead EKG done on 9/22/2023 shows sinus bradycardia heart rate 52 bpm no other significant abnormality.    Twelve-lead EKG done on 7/23/2024 sinus rhythm left axis deviation heart rate 64 bpm.      Lab Review     BMP:  Lab Results   Component Value Date    SODIUM 144 02/07/2024    K 4.3 02/07/2024     02/07/2024    CO2 24 02/07/2024    BUN 12 02/07/2024    CREATININE 0.87 02/07/2024    GLUC 107 (H) 02/07/2024    CALCIUM 9.0 01/20/2020    EGFR 72 02/07/2024     LFT:  Lab Results   Component Value Date    AST 26 02/07/2024    ALT 34 (H) 02/07/2024    ALKPHOS 90 01/20/2020    TP 7.0 02/07/2024    ALB 5.0 (H) 02/07/2024      No results found for: \"[id number]\"  Lab Results   Component Value Date    HGBA1C 5.6 02/07/2024     Lipid Profile:   Lab Results   Component Value Date    CHOLESTEROL 211 (H) 02/07/2024    HDL 56 02/07/2024    LDLCALC 131 (H) 02/07/2024    TRIG 135 02/07/2024     Lab Results   Component Value Date    CHOLESTEROL 211 (H) " "02/07/2024    CHOLESTEROL 216 (H) 03/07/2023       Flecainide level in July 2021 0.49    The 10-year ASCVD risk score (Dorothy DK, et al., 2019) is: 9.6%    Values used to calculate the score:      Age: 69 years      Sex: Female      Is Non- : No      Diabetic: No      Tobacco smoker: No      Systolic Blood Pressure: 110 mmHg      Is BP treated: Yes      HDL Cholesterol: 56 mg/dL      Total Cholesterol: 211 mg/dL     Dr. Helder Damon MD [facility]      \"This note has been constructed using a voice recognition system.Therefore there may be syntax, spelling, and/or grammatical errors. Please call if you have any questions. \"  "

## 2024-07-24 DIAGNOSIS — I48.0 PAROXYSMAL ATRIAL FIBRILLATION (HCC): ICD-10-CM

## 2024-07-24 DIAGNOSIS — E78.5 HYPERLIPIDEMIA LDL GOAL <130: ICD-10-CM

## 2024-07-25 RX ORDER — FLECAINIDE ACETATE 100 MG/1
100 TABLET ORAL 2 TIMES DAILY
Qty: 180 TABLET | Refills: 3 | Status: SHIPPED | OUTPATIENT
Start: 2024-07-25

## 2024-07-30 NOTE — TELEPHONE ENCOUNTER
Delivery Note  Information for the patient's :  Julio César Powers [[id number]]   Birth Information  YOB: 2025  Time of birth: infant presented in FARIDEH position, restituted to ROT baby stable.     Attending: Dr. Joseluis MD   Resident: Zoe Coe DO, PGY-2   Review the Delivery Report for details            Yonatan Blush needs a MAMMO order  Please call when ready for       ALSO, Refill Atorvastatin and amlodipine   [facility] [phone]    Thank You

## 2024-08-01 RX ORDER — ATORVASTATIN CALCIUM 20 MG/1
20 TABLET, FILM COATED ORAL DAILY
Qty: 90 TABLET | Refills: 1 | Status: SHIPPED | OUTPATIENT
Start: 2024-08-01

## 2024-08-08 ENCOUNTER — OFFICE VISIT (OUTPATIENT)
Dept: FAMILY MEDICINE CLINIC | Facility: CLINIC | Age: 69
End: 2024-08-08
Payer: MEDICARE

## 2024-08-08 VITALS
TEMPERATURE: 96 F | HEIGHT: 66 IN | SYSTOLIC BLOOD PRESSURE: 124 MMHG | DIASTOLIC BLOOD PRESSURE: 64 MMHG | HEART RATE: 64 BPM | BODY MASS INDEX: 31.18 KG/M2 | RESPIRATION RATE: 20 BRPM | WEIGHT: 194 LBS

## 2024-08-08 DIAGNOSIS — Z00.00 MEDICARE ANNUAL WELLNESS VISIT, SUBSEQUENT: Primary | ICD-10-CM

## 2024-08-08 DIAGNOSIS — Z13.1 SCREENING FOR DIABETES MELLITUS (DM): ICD-10-CM

## 2024-08-08 DIAGNOSIS — E78.49 OTHER HYPERLIPIDEMIA: ICD-10-CM

## 2024-08-08 DIAGNOSIS — E78.5 HYPERLIPIDEMIA LDL GOAL <130: ICD-10-CM

## 2024-08-08 DIAGNOSIS — R73.03 PREDIABETES: ICD-10-CM

## 2024-08-08 DIAGNOSIS — I10 BENIGN ESSENTIAL HYPERTENSION: ICD-10-CM

## 2024-08-08 DIAGNOSIS — Z11.59 NEED FOR HEPATITIS C SCREENING TEST: ICD-10-CM

## 2024-08-08 PROCEDURE — 99214 OFFICE O/P EST MOD 30 MIN: CPT | Performed by: FAMILY MEDICINE

## 2024-08-08 PROCEDURE — G0439 PPPS, SUBSEQ VISIT: HCPCS | Performed by: FAMILY MEDICINE

## 2024-08-08 NOTE — PROGRESS NOTES
Assessment/Plan:    No problem-specific Assessment & Plan notes found for this encounter.    HLD  On statin  Work on TLC to reduce LDL    Htn stable    Prediabetes  Watch carbs   Exercise  monitor       Diagnoses and all orders for this visit:    Medicare annual wellness visit, subsequent    Screening for diabetes mellitus (DM)  -     Comprehensive metabolic panel; Future  -     Comprehensive metabolic panel    Prediabetes  -     Hemoglobin A1C; Future  -     Hemoglobin A1C  -     Hemoglobin A1C; Future  -     Hemoglobin A1C    Benign essential hypertension  -     Comprehensive metabolic panel; Future  -     Comprehensive metabolic panel    Hyperlipidemia LDL goal <130    Other hyperlipidemia  -     Lipid Panel with Direct LDL reflex; Future  -     Lipid Panel with Direct LDL reflex    Need for hepatitis C screening test  -     Hepatitis C antibody; Future  -     Hepatitis C antibody          Return in about 6 months (around 2/8/2025) for Recheck.    Subjective:      Patient ID: Cookie Newby is a 69 y.o. female.    Chief Complaint   Patient presents with    Follow-up     BP check [id number]       HPI  Htn stable  Daily exercise, walking  Tolerating meds  Eating healthy    The following portions of the patient's history were reviewed and updated as appropriate: allergies, current medications, past family history, past medical history, past social history, past surgical history and problem list.    Review of Systems   Respiratory:  Negative for shortness of breath.    Cardiovascular:  Negative for leg swelling.         Current Outpatient Medications   Medication Sig Dispense Refill    amLODIPine (NORVASC) 5 mg tablet TAKE 1 TABLET BY MOUTH DAILY 90 tablet 3    atorvastatin (LIPITOR) 20 mg tablet TAKE 1 TABLET BY MOUTH ONCE  DAILY 90 tablet 1    brimonidine tartrate 0.2 % ophthalmic solution       bromfenac sodium (Prolensa) 0.07 % SOLN Apply to eye      Eliquis 5 MG TAKE 1 TABLET BY MOUTH TWICE  DAILY 180 tablet 3  "   flecainide (TAMBOCOR) 100 mg tablet TAKE 1 TABLET BY MOUTH TWICE  DAILY 180 tablet 3    ketorolac (ACULAR) 0.5 % ophthalmic solution 1 drop 4 (four) times a day      latanoprost (XALATAN) 0.005 % ophthalmic solution Administer 1 drop to both eyes daily at bedtime      metoprolol tartrate (LOPRESSOR) 25 mg tablet Take 0.5 tablets (12.5 mg total) by mouth every 12 (twelve) hours 90 tablet 3    prednisoLONE acetate (PRED FORTE) 1 % ophthalmic suspension 1 drop 4 (four) times a day       No current facility-administered medications for this visit.       Objective:    /64   Pulse 64   Temp (!) 96 °F (35.6 °C)   Resp 20   Ht 5' 6\" (1.676 m)   Wt 88 kg (194 lb)   BMI 31.31 kg/m²        Physical Exam  Vitals and nursing note reviewed.   Constitutional:       General: She is not in acute distress.     Appearance: She is well-developed. She is obese. She is not ill-appearing.   HENT:      Head: Normocephalic.      Right Ear: Tympanic membrane normal.      Left Ear: Tympanic membrane normal.      Mouth/Throat:      Mouth: Mucous membranes are moist.   Eyes:      General: No scleral icterus.     Conjunctiva/sclera: Conjunctivae normal.   Neck:      Vascular: No carotid bruit.   Cardiovascular:      Rate and Rhythm: Normal rate and regular rhythm.      Heart sounds: No murmur heard.  Pulmonary:      Effort: Pulmonary effort is normal. No respiratory distress.      Breath sounds: No wheezing or rales.   Abdominal:      General: There is no distension.      Palpations: Abdomen is soft.      Tenderness: There is no abdominal tenderness.   Musculoskeletal:         General: No deformity.      Cervical back: Neck supple.      Right lower leg: No edema.      Left lower leg: No edema.   Skin:     General: Skin is warm and dry.      Coloration: Skin is not jaundiced or pale.   Neurological:      Mental Status: She is alert.      Motor: No weakness.      Gait: Gait normal.   Psychiatric:         Mood and Affect: Mood " normal.         Behavior: Behavior normal.         Thought Content: Thought content normal.                Ankit Garcia, DO

## 2024-08-09 PROBLEM — Z00.00 MEDICARE ANNUAL WELLNESS VISIT, SUBSEQUENT: Status: ACTIVE | Noted: 2024-08-09

## 2024-08-09 PROBLEM — E78.49 OTHER HYPERLIPIDEMIA: Status: ACTIVE | Noted: 2024-08-09

## 2024-08-09 NOTE — PATIENT INSTRUCTIONS
Medicare Preventive Visit Patient Instructions  Thank you for completing your Welcome to Medicare Visit or Medicare Annual Wellness Visit today. Your next wellness visit will be due in one year (8/10/2025).  The screening/preventive services that you may require over the next 5-10 years are detailed below. Some tests may not apply to you based off risk factors and/or age. Screening tests ordered at today's visit but not completed yet may show as past due. Also, please note that scanned in results may not display below.  Preventive Screenings:  Service Recommendations Previous Testing/Comments   Colorectal Cancer Screening  * Colonoscopy    * Fecal Occult Blood Test (FOBT)/Fecal Immunochemical Test (FIT)  * Fecal DNA/Cologuard Test  * Flexible Sigmoidoscopy Age: 45-75 years old   Colonoscopy: every 10 years (may be performed more frequently if at higher risk)  OR  FOBT/FIT: every 1 year  OR  Cologuard: every 3 years  OR  Sigmoidoscopy: every 5 years  Screening may be recommended earlier than age 45 if at higher risk for colorectal cancer. Also, an individualized decision between you and your healthcare provider will decide whether screening between the ages of 76-85 would be appropriate. Colonoscopy: 05/07/2019  FOBT/FIT: Not on file  Cologuard: Not on file  Sigmoidoscopy: Not on file          Breast Cancer Screening Age: 40+ years old  Frequency: every 1-2 years  Not required if history of left and right mastectomy Mammogram: 04/28/2023        Cervical Cancer Screening Between the ages of 21-29, pap smear recommended once every 3 years.   Between the ages of 30-65, can perform pap smear with HPV co-testing every 5 years.   Recommendations may differ for women with a history of total hysterectomy, cervical cancer, or abnormal pap smears in past. Pap Smear: Not on file        Hepatitis C Screening Once for adults born between 1945 and 1965  More frequently in patients at high risk for Hepatitis C Hep C Antibody: Not  on file        Diabetes Screening 1-2 times per year if you're at risk for diabetes or have pre-diabetes Fasting glucose: No results in last 5 years (No results in last 5 years)  A1C: 5.6 % (2/7/2024)      Cholesterol Screening Once every 5 years if you don't have a lipid disorder. May order more often based on risk factors. Lipid panel: 02/07/2024          Other Preventive Screenings Covered by Medicare:  Abdominal Aortic Aneurysm (AAA) Screening: covered once if your at risk. You're considered to be at risk if you have a family history of AAA.  Lung Cancer Screening: covers low dose CT scan once per year if you meet all of the following conditions: (1) Age 55-77; (2) No signs or symptoms of lung cancer; (3) Current smoker or have quit smoking within the last 15 years; (4) You have a tobacco smoking history of at least 20 pack years (packs per day multiplied by number of years you smoked); (5) You get a written order from a healthcare provider.  Glaucoma Screening: covered annually if you're considered high risk: (1) You have diabetes OR (2) Family history of glaucoma OR (3)  aged 50 and older OR (4)  American aged 65 and older  Osteoporosis Screening: covered every 2 years if you meet one of the following conditions: (1) You're estrogen deficient and at risk for osteoporosis based off medical history and other findings; (2) Have a vertebral abnormality; (3) On glucocorticoid therapy for more than 3 months; (4) Have primary hyperparathyroidism; (5) On osteoporosis medications and need to assess response to drug therapy.   Last bone density test (DXA Scan): 01/04/2022.  HIV Screening: covered annually if you're between the age of 15-65. Also covered annually if you are younger than 15 and older than 65 with risk factors for HIV infection. For pregnant patients, it is covered up to 3 times per pregnancy.    Immunizations:  Immunization Recommendations   Influenza Vaccine Annual influenza  vaccination during flu season is recommended for all persons aged >= 6 months who do not have contraindications   Pneumococcal Vaccine   * Pneumococcal conjugate vaccine = PCV13 (Prevnar 13), PCV15 (Vaxneuvance), PCV20 (Prevnar 20)  * Pneumococcal polysaccharide vaccine = PPSV23 (Pneumovax) Adults 19-65 yo with certain risk factors or if 65+ yo  If never received any pneumonia vaccine: recommend Prevnar 20 (PCV20)  Give PCV20 if previously received 1 dose of PCV13 or PPSV23   Hepatitis B Vaccine 3 dose series if at intermediate or high risk (ex: diabetes, end stage renal disease, liver disease)   Respiratory syncytial virus (RSV) Vaccine - COVERED BY MEDICARE PART D  * RSVPreF3 (Arexvy) CDC recommends that adults 60 years of age and older may receive a single dose of RSV vaccine using shared clinical decision-making (SCDM)   Tetanus (Td) Vaccine - COST NOT COVERED BY MEDICARE PART B Following completion of primary series, a booster dose should be given every 10 years to maintain immunity against tetanus. Td may also be given as tetanus wound prophylaxis.   Tdap Vaccine - COST NOT COVERED BY MEDICARE PART B Recommended at least once for all adults. For pregnant patients, recommended with each pregnancy.   Shingles Vaccine (Shingrix) - COST NOT COVERED BY MEDICARE PART B  2 shot series recommended in those 19 years and older who have or will have weakened immune systems or those 50 years and older     Health Maintenance Due:      Topic Date Due   • Hepatitis C Screening  Never done   • Breast Cancer Screening: Mammogram  04/28/2024   • Colorectal Cancer Screening  05/07/2029     Immunizations Due:      Topic Date Due   • COVID-19 Vaccine (3 - 2023-24 season) 09/01/2023   • Influenza Vaccine (1) 09/01/2024     Advance Directives   What are advance directives?  Advance directives are legal documents that state your wishes and plans for medical care. These plans are made ahead of time in case you lose your ability to  make decisions for yourself. Advance directives can apply to any medical decision, such as the treatments you want, and if you want to donate organs.   What are the types of advance directives?  There are many types of advance directives, and each state has rules about how to use them. You may choose a combination of any of the following:  Living will:  This is a written record of the treatment you want. You can also choose which treatments you do not want, which to limit, and which to stop at a certain time. This includes surgery, medicine, IV fluid, and tube feedings.   Durable power of  for healthcare (DPAHC):  This is a written record that states who you want to make healthcare choices for you when you are unable to make them for yourself. This person, called a proxy, is usually a family member or a friend. You may choose more than 1 proxy.  Do not resuscitate (DNR) order:  A DNR order is used in case your heart stops beating or you stop breathing. It is a request not to have certain forms of treatment, such as CPR. A DNR order may be included in other types of advance directives.  Medical directive:  This covers the care that you want if you are in a coma, near death, or unable to make decisions for yourself. You can list the treatments you want for each condition. Treatment may include pain medicine, surgery, blood transfusions, dialysis, IV or tube feedings, and a ventilator (breathing machine).  Values history:  This document has questions about your views, beliefs, and how you feel and think about life. This information can help others choose the care that you would choose.  Why are advance directives important?  An advance directive helps you control your care. Although spoken wishes may be used, it is better to have your wishes written down. Spoken wishes can be misunderstood, or not followed. Treatments may be given even if you do not want them. An advance directive may make it easier for your  family to make difficult choices about your care.   Weight Management   Why it is important to manage your weight:  Being overweight increases your risk of health conditions such as heart disease, high blood pressure, type 2 diabetes, and certain types of cancer. It can also increase your risk for osteoarthritis, sleep apnea, and other respiratory problems. Aim for a slow, steady weight loss. Even a small amount of weight loss can lower your risk of health problems.  How to lose weight safely:  A safe and healthy way to lose weight is to eat fewer calories and get regular exercise. You can lose up about 1 pound a week by decreasing the number of calories you eat by 500 calories each day.   Healthy meal plan for weight management:  A healthy meal plan includes a variety of foods, contains fewer calories, and helps you stay healthy. A healthy meal plan includes the following:  Eat whole-grain foods more often.  A healthy meal plan should contain fiber. Fiber is the part of grains, fruits, and vegetables that is not broken down by your body. Whole-grain foods are healthy and provide extra fiber in your diet. Some examples of whole-grain foods are whole-wheat breads and pastas, oatmeal, brown rice, and bulgur.  Eat a variety of vegetables every day.  Include dark, leafy greens such as spinach, kale, stevan greens, and mustard greens. Eat yellow and orange vegetables such as carrots, sweet potatoes, and winter squash.   Eat a variety of fruits every day.  Choose fresh or canned fruit (canned in its own juice or light syrup) instead of juice. Fruit juice has very little or no fiber.  Eat low-fat dairy foods.  Drink fat-free (skim) milk or 1% milk. Eat fat-free yogurt and low-fat cottage cheese. Try low-fat cheeses such as mozzarella and other reduced-fat cheeses.  Choose meat and other protein foods that are low in fat.  Choose beans or other legumes such as split peas or lentils. Choose fish, skinless poultry (chicken  or turkey), or lean cuts of red meat (beef or pork). Before you cook meat or poultry, cut off any visible fat.   Use less fat and oil.  Try baking foods instead of frying them. Add less fat, such as margarine, sour cream, regular salad dressing and mayonnaise to foods. Eat fewer high-fat foods. Some examples of high-fat foods include french fries, doughnuts, ice cream, and cakes.  Eat fewer sweets.  Limit foods and drinks that are high in sugar. This includes candy, cookies, regular soda, and sweetened drinks.  Exercise:  Exercise at least 30 minutes per day on most days of the week. Some examples of exercise include walking, biking, dancing, and swimming. You can also fit in more physical activity by taking the stairs instead of the elevator or parking farther away from stores. Ask your healthcare provider about the best exercise plan for you.      © Copyright [employer] 2018 Information is for End User's use only and may not be sold, redistributed or otherwise used for commercial purposes. All illustrations and images included in CareNotes® are the copyrighted property of A.D.A[employer]M., Inc. or [employer]

## 2024-08-09 NOTE — PROGRESS NOTES
Ambulatory Visit  Name: Cookie Newby      : 1955      MRN: [id number]  Encounter Provider: Ankit Garcia DO  Encounter Date: 2024   Encounter department: [facility]    Assessment & Plan   1. Medicare annual wellness visit, subsequent  2. Screening for diabetes mellitus (DM)  -     Comprehensive metabolic panel; Future; Expected date: 2024  -     Comprehensive metabolic panel  3. Prediabetes  -     Hemoglobin A1C; Future; Expected date: 2024  -     Hemoglobin A1C  -     Hemoglobin A1C; Future; Expected date: 2025  -     Hemoglobin A1C  4. Benign essential hypertension  -     Comprehensive metabolic panel; Future; Expected date: 2025  -     Comprehensive metabolic panel  5. Hyperlipidemia LDL goal <130  6. Other hyperlipidemia  -     Lipid Panel with Direct LDL reflex; Future; Expected date: 2025  -     Lipid Panel with Direct LDL reflex  7. Need for hepatitis C screening test  -     Hepatitis C antibody; Future; Expected date: 2025  -     Hepatitis C antibody       Preventive health issues were discussed with patient, and age appropriate screening tests were ordered as noted in patient's After Visit Summary. Personalized health advice and appropriate referrals for health education or preventive services given if needed, as noted in patient's After Visit Summary.    History of Present Illness     HPI   Patient Care Team:  Ankit Garcia DO as PCP - General    Review of Systems  Medical History Reviewed by provider this encounter:  Tobacco  Allergies  Meds  Problems  Med Hx  Surg Hx  Fam Hx       Annual Wellness Visit Questionnaire   Cookie is here for her Subsequent Wellness visit. Last Medicare Wellness visit information reviewed, patient interviewed and updates made to the record today.      Health Risk Assessment:   Patient rates overall health as very good. Patient feels that their physical health rating is same. Patient is very  satisfied with their life. Eyesight was rated as slightly worse. Hearing was rated as same. Patient feels that their emotional and mental health rating is same. Patients states they are never, rarely angry. Patient states they are sometimes unusually tired/fatigued. Pain experienced in the last 7 days has been none. Patient states that she has experienced no weight loss or gain in last 6 months.     Depression Screening:   PHQ-2 Score: 0      Fall Risk Screening:   In the past year, patient has experienced: no history of falling in past year      Urinary Incontinence Screening:   Patient has not leaked urine accidently in the last six months.     Home Safety:  Patient does not have trouble with stairs inside or outside of their home. Patient has working smoke alarms and has working carbon monoxide detector. Home safety hazards include: none.     Nutrition:   Current diet is Regular.     Medications:   Patient is not currently taking any over-the-counter supplements. Patient is able to manage medications.     Activities of Daily Living (ADLs)/Instrumental Activities of Daily Living (IADLs):   Walk and transfer into and out of bed and chair?: Yes  Dress and groom yourself?: Yes    Bathe or shower yourself?: Yes    Feed yourself? Yes  Do your laundry/housekeeping?: Yes  Manage your money, pay your bills and track your expenses?: Yes  Make your own meals?: Yes    Do your own shopping?: Yes    Previous Hospitalizations:   Any hospitalizations or ED visits within the last 12 months?: No      Advance Care Planning:   Living will: No    Durable POA for healthcare: Yes    Advanced directive: Yes    End of Life Decisions reviewed with patient: No      Cognitive Screening:   Provider or family/friend/caregiver concerned regarding cognition?: No    PREVENTIVE SCREENINGS      Cardiovascular Screening:    General: Screening Not Indicated and History Lipid Disorder      Diabetes Screening:     General: Screening Not Indicated and  History Diabetes      Colorectal Cancer Screening:     General: Screening Current      Prostate Cancer Screening:    General: History Prostate Cancer and Screening Not Indicated      Breast Cancer Screening:     General: Screening Current      Cervical Cancer Screening:    General: Screening Not Indicated      Osteoporosis Screening:    General: Risks and Benefits Discussed      Abdominal Aortic Aneurysm (AAA) Screening:    Risk factors include: tobacco use        General: Screening Not Indicated      Lung Cancer Screening:     General: Screening Not Indicated      Hepatitis C Screening:    General: Risks and Benefits Discussed    Hep C Screening Accepted: Yes      Screening, Brief Intervention, and Referral to Treatment (SBIRT)    Screening  Typical number of drinks in a day: 1  Typical number of drinks in a week: 5  Interpretation: Low risk drinking behavior.    Single Item Drug Screening:  How often have you used an illegal drug (including marijuana) or a prescription medication for non-medical reasons in the past year? never    Single Item Drug Screen Score: 0  Interpretation: Negative screen for possible drug use disorder    Other Counseling Topics:   Car/seat belt/driving safety and regular weightbearing exercise.     Social Determinants of Health     Financial Resource Strain: Low Risk  (3/8/2023)    Overall Financial Resource Strain (CARDIA)     Difficulty of Paying Living Expenses: Not hard at all   Food Insecurity: No Food Insecurity (8/9/2024)    Hunger Vital Sign     Worried About Running Out of Food in the Last Year: Never true     Ran Out of Food in the Last Year: Never true   Transportation Needs: No Transportation Needs (8/9/2024)    PRAPARE - Transportation     Lack of Transportation (Medical): No     Lack of Transportation (Non-Medical): No   Housing Stability: Low Risk  (8/9/2024)    Housing Stability Vital Sign     Unable to Pay for Housing in the Last Year: No     Number of Times Moved in the  "Last Year: 0     Homeless in the Last Year: No   Utilities: Not At Risk (8/9/2024)    [facility] Utilities     Threatened with loss of utilities: No     No results found.    Objective     /64   Pulse 64   Temp (!) 96 °F (35.6 °C)   Resp 20   Ht 5' 6\" (1.676 m)   Wt 88 kg (194 lb)   BMI 31.31 kg/m²     Physical Exam      "

## 2024-08-14 ENCOUNTER — HOSPITAL ENCOUNTER (OUTPATIENT)
Dept: RADIOLOGY | Facility: HOSPITAL | Age: 69
Discharge: HOME | End: 2024-08-14
Payer: MEDICARE

## 2024-08-14 VITALS — HEIGHT: 66 IN | WEIGHT: 194 LBS | BODY MASS INDEX: 31.18 KG/M2

## 2024-08-14 DIAGNOSIS — Z12.31 BREAST CANCER SCREENING BY MAMMOGRAM: ICD-10-CM

## 2024-08-14 PROCEDURE — 77067 SCR MAMMO BI INCL CAD: CPT

## 2024-08-14 PROCEDURE — 77063 BREAST TOMOSYNTHESIS BI: CPT

## 2024-08-29 ENCOUNTER — OFFICE VISIT (OUTPATIENT)
Dept: GASTROENTEROLOGY | Facility: CLINIC | Age: 69
End: 2024-08-29
Payer: MEDICARE

## 2024-08-29 ENCOUNTER — TELEPHONE (OUTPATIENT)
Dept: GASTROENTEROLOGY | Facility: CLINIC | Age: 69
End: 2024-08-29

## 2024-08-29 VITALS
SYSTOLIC BLOOD PRESSURE: 149 MMHG | HEIGHT: 66 IN | DIASTOLIC BLOOD PRESSURE: 88 MMHG | WEIGHT: 193.4 LBS | HEART RATE: 63 BPM | BODY MASS INDEX: 31.08 KG/M2

## 2024-08-29 DIAGNOSIS — Z86.010 PERSONAL HISTORY OF COLONIC POLYPS: Primary | ICD-10-CM

## 2024-08-29 DIAGNOSIS — Z83.79 FAMILY HISTORY OF CROHN'S DISEASE: ICD-10-CM

## 2024-08-29 DIAGNOSIS — Z79.01 CHRONIC ANTICOAGULATION: ICD-10-CM

## 2024-08-29 DIAGNOSIS — K64.8 OTHER HEMORRHOIDS: ICD-10-CM

## 2024-08-29 LAB
ALBUMIN SERPL-MCNC: 4.7 G/DL (ref 3.9–4.9)
ALP SERPL-CCNC: 108 IU/L (ref 44–121)
ALT SERPL-CCNC: 21 IU/L (ref 0–32)
AST SERPL-CCNC: 19 IU/L (ref 0–40)
BILIRUB SERPL-MCNC: 0.5 MG/DL (ref 0–1.2)
BUN SERPL-MCNC: 16 MG/DL (ref 8–27)
BUN/CREAT SERPL: 19 (ref 12–28)
CALCIUM SERPL-MCNC: 9.1 MG/DL (ref 8.7–10.3)
CHLORIDE SERPL-SCNC: 102 MMOL/L (ref 96–106)
CHOLEST SERPL-MCNC: 197 MG/DL (ref 100–199)
CO2 SERPL-SCNC: 23 MMOL/L (ref 20–29)
CREAT SERPL-MCNC: 0.84 MG/DL (ref 0.57–1)
EGFR: 75 ML/MIN/1.73
EST. AVERAGE GLUCOSE BLD GHB EST-MCNC: 111 MG/DL
GLOBULIN SER-MCNC: 2.2 G/DL (ref 1.5–4.5)
GLUCOSE SERPL-MCNC: 99 MG/DL (ref 70–99)
HBA1C MFR BLD: 5.5 % (ref 4.8–5.6)
HCV AB S/CO SERPL IA: NON REACTIVE
HDLC SERPL-MCNC: 55 MG/DL
LDLC SERPL CALC-MCNC: 117 MG/DL (ref 0–99)
LDLC/HDLC SERPL: 2.1 RATIO (ref 0–3.2)
MICRODELETION SYND BLD/T FISH: NORMAL
POTASSIUM SERPL-SCNC: 4.1 MMOL/L (ref 3.5–5.2)
PROT SERPL-MCNC: 6.9 G/DL (ref 6–8.5)
SL AMB VLDL CHOLESTEROL CALC: 25 MG/DL (ref 5–40)
SODIUM SERPL-SCNC: 142 MMOL/L (ref 134–144)
TRIGL SERPL-MCNC: 140 MG/DL (ref 0–149)

## 2024-08-29 PROCEDURE — 99204 OFFICE O/P NEW MOD 45 MIN: CPT | Performed by: INTERNAL MEDICINE

## 2024-08-29 RX ORDER — POLYETHYLENE GLYCOL 3350 17 G/17G
POWDER, FOR SOLUTION ORAL
Qty: 238 G | Refills: 0 | Status: SHIPPED | OUTPATIENT
Start: 2024-08-29 | End: 2024-08-29

## 2024-08-29 RX ORDER — SODIUM PICOSULFATE, MAGNESIUM OXIDE, AND ANHYDROUS CITRIC ACID 12; 3.5; 1 G/175ML; G/175ML; MG/175ML
LIQUID ORAL
Qty: 350 ML | Refills: 0 | Status: SHIPPED | OUTPATIENT
Start: 2024-08-29

## 2024-08-29 NOTE — PATIENT INSTRUCTIONS
For your hemorrhoids, as we discussed during today's visit,  - I would recommend starting psyllium, fiber supplementation.  This can be done with use of Konsyl, Citrucil, Metamucil or Benefiber fiber, which can be purchased over-the-counter.  I would recommend starting slow with 1 tsp mixed into a large glass of water, once a day, and increasing to goal of 1 tbsp mixed into a large glass of water per day.  - this helps with both diarrhea and constipation, helping to bulk the stool, and should not cause constipation or diarrhea, but treat both  - the only side effect of this can be bloating, if this occurs, cut down on the dose, and increase your water intake or alternatively, consider switching to an alternative as listed above

## 2024-08-29 NOTE — PROGRESS NOTES
Ambulatory Visit  Name: Cookie Newby      : 1955      MRN: [id number]  Encounter Provider: Rosalia Araujo MD  Encounter Date: 2024   Encounter department: [facility] [place]    Assessment & Plan   1. Personal history of colonic polyps  -     Colonoscopy; Future; Expected date: 2024  -     sodium picosulfate, magnesium oxide, citric acid (Clenpiq) oral solution; Take 175 mL (1 bottle) the evening before the colonoscopy, between 5 PM and 9 PM, followed by a second 175 mL bottle 5 hours before the colonoscopy.  2. Family history of Crohn's disease  3. Other hemorrhoids  Assessment & Plan:  Chronic symptoms >12 months, clinically stable  Previous hemorrhoidal surgery  Recommend psyllium powder fiber  4. Chronic anticoagulation  Comments:  Will discuss with patient's cardiologist regarding periprocedural management of anticoagulation      History of Present Illness     Cookie Newby is a 69 y.o. female who presents for personal history of colon polyps.  She has HTN, HLD, pA-fib on Eliquis, macular degeneration, mitral valve disorder, prediabetes, BMI of 31.  She has a family history of inflammatory bowel disease-Crohn's disease and her daughter and son, who are diagnosed as children.  She reports hemorrhoids, and has previously undergone surgery which was not effective for her.  She does feel a bulging in her rectal area, she states that this does not bother her at this time, but does occasionally cause pink-colored tinges.  Denies abdominal pain, constipation/diarrhea, rectal bleeding, upper GI symptoms including dysphagia/reflux.    Colonoscopy 2019-reviewed external report, subcentimeter tubular adenoma of the transverse colon, recall 5 years  EGD 2019-small hiatal hernia, antral gastritis, biopsies unremarkable    Abdominal Pain  The problem occurs rarely. The problem has been resolved. The pain is located in the right flank. The pain is at a severity of 1/10.  "The quality of the pain is aching. The abdominal pain radiates to the right flank and pelvis. Pertinent negatives include no anorexia, arthralgias, belching, constipation, diarrhea, dysuria, fever, flatus, frequency, headaches, hematochezia, hematuria, melena, myalgias, nausea, vomiting or weight loss. Nothing aggravates the pain.         Review of Systems   Constitutional:  Negative for chills, fever and weight loss.   HENT:  Negative for ear pain and sore throat.    Eyes:  Negative for pain and visual disturbance.   Respiratory:  Negative for cough and shortness of breath.    Cardiovascular:  Negative for chest pain and palpitations.   Gastrointestinal:  Positive for abdominal pain. Negative for anorexia, constipation, diarrhea, flatus, hematochezia, melena, nausea and vomiting.   Genitourinary:  Negative for dysuria, frequency and hematuria.   Musculoskeletal:  Negative for arthralgias, back pain and myalgias.   Skin:  Negative for color change and rash.   Neurological:  Negative for seizures, syncope and headaches.   All other systems reviewed and are negative.      Objective     /88 (BP Location: Left arm, Patient Position: Sitting, Cuff Size: Standard)   Pulse 63   Ht 5' 6\" (1.676 m)   Wt 87.7 kg (193 lb 6.4 oz)   BMI 31.22 kg/m²     Physical Exam  Vitals and nursing note reviewed.   Constitutional:       General: She is not in acute distress.     Appearance: Normal appearance.   HENT:      Head: Normocephalic and atraumatic.   Eyes:      Conjunctiva/sclera: Conjunctivae normal.   Cardiovascular:      Rate and Rhythm: Normal rate and regular rhythm.      Heart sounds: No murmur heard.  Pulmonary:      Effort: Pulmonary effort is normal. No respiratory distress.      Breath sounds: Normal breath sounds.   Abdominal:      Palpations: Abdomen is soft.      Tenderness: There is no abdominal tenderness.   Musculoskeletal:         General: No swelling.      Cervical back: Neck supple.   Skin:     General: " Skin is warm and dry.      Capillary Refill: Capillary refill takes less than 2 seconds.   Neurological:      Mental Status: She is alert.   Psychiatric:         Mood and Affect: Mood normal.         Administrative Statements

## 2024-08-29 NOTE — ASSESSMENT & PLAN NOTE
Chronic symptoms >12 months, clinically stable  Previous hemorrhoidal surgery  Recommend psyllium powder fiber

## 2024-08-29 NOTE — TELEPHONE ENCOUNTER
Our mutual patient is scheduled for procedure: colonoscopy    On: October 10, 2024     With: Dr. Rosalia Araujo MD    He/She is taking the following blood thinner: Eliquis (Apixaban)    Can this be stopped  2 days prior to the procedure    Physician Approving clearance: DR MANN

## 2024-09-08 PROBLEM — Z00.00 MEDICARE ANNUAL WELLNESS VISIT, SUBSEQUENT: Status: RESOLVED | Noted: 2024-08-09 | Resolved: 2024-09-08

## 2024-09-13 DIAGNOSIS — I10 BENIGN ESSENTIAL HYPERTENSION: ICD-10-CM

## 2024-09-13 RX ORDER — AMLODIPINE BESYLATE 5 MG/1
5 TABLET ORAL DAILY
Qty: 90 TABLET | Refills: 1 | Status: SHIPPED | OUTPATIENT
Start: 2024-09-13

## 2024-09-26 ENCOUNTER — ANESTHESIA EVENT (OUTPATIENT)
Dept: ANESTHESIOLOGY | Facility: HOSPITAL | Age: 69
End: 2024-09-26

## 2024-09-26 ENCOUNTER — ANESTHESIA (OUTPATIENT)
Dept: ANESTHESIOLOGY | Facility: HOSPITAL | Age: 69
End: 2024-09-26

## 2024-10-04 ENCOUNTER — TELEPHONE (OUTPATIENT)
Dept: GASTROENTEROLOGY | Facility: AMBULARY SURGERY CENTER | Age: 69
End: 2024-10-04

## 2024-10-10 ENCOUNTER — HOSPITAL ENCOUNTER (OUTPATIENT)
Dept: GASTROENTEROLOGY | Facility: AMBULARY SURGERY CENTER | Age: 69
Setting detail: OUTPATIENT SURGERY
End: 2024-10-10
Attending: INTERNAL MEDICINE
Payer: MEDICARE

## 2024-10-10 ENCOUNTER — ANESTHESIA (OUTPATIENT)
Dept: GASTROENTEROLOGY | Facility: AMBULARY SURGERY CENTER | Age: 69
End: 2024-10-10
Payer: MEDICARE

## 2024-10-10 VITALS
RESPIRATION RATE: 20 BRPM | SYSTOLIC BLOOD PRESSURE: 128 MMHG | TEMPERATURE: 98.1 F | OXYGEN SATURATION: 96 % | HEART RATE: 57 BPM | DIASTOLIC BLOOD PRESSURE: 67 MMHG

## 2024-10-10 DIAGNOSIS — Z86.0100 HISTORY OF COLONIC POLYPS: ICD-10-CM

## 2024-10-10 PROCEDURE — 45380 COLONOSCOPY AND BIOPSY: CPT | Performed by: INTERNAL MEDICINE

## 2024-10-10 PROCEDURE — 88305 TISSUE EXAM BY PATHOLOGIST: CPT | Performed by: STUDENT IN AN ORGANIZED HEALTH CARE EDUCATION/TRAINING PROGRAM

## 2024-10-10 RX ORDER — LIDOCAINE HYDROCHLORIDE 10 MG/ML
INJECTION, SOLUTION EPIDURAL; INFILTRATION; INTRACAUDAL; PERINEURAL AS NEEDED
Status: DISCONTINUED | OUTPATIENT
Start: 2024-10-10 | End: 2024-10-10

## 2024-10-10 RX ORDER — PROPOFOL 10 MG/ML
INJECTION, EMULSION INTRAVENOUS AS NEEDED
Status: DISCONTINUED | OUTPATIENT
Start: 2024-10-10 | End: 2024-10-10

## 2024-10-10 RX ORDER — SODIUM CHLORIDE, SODIUM LACTATE, POTASSIUM CHLORIDE, CALCIUM CHLORIDE 600; 310; 30; 20 MG/100ML; MG/100ML; MG/100ML; MG/100ML
INJECTION, SOLUTION INTRAVENOUS CONTINUOUS PRN
Status: DISCONTINUED | OUTPATIENT
Start: 2024-10-10 | End: 2024-10-10

## 2024-10-10 RX ORDER — PROPOFOL 10 MG/ML
INJECTION, EMULSION INTRAVENOUS CONTINUOUS PRN
Status: DISCONTINUED | OUTPATIENT
Start: 2024-10-10 | End: 2024-10-10

## 2024-10-10 RX ORDER — ONDANSETRON 2 MG/ML
4 INJECTION INTRAMUSCULAR; INTRAVENOUS ONCE AS NEEDED
Status: CANCELLED | OUTPATIENT
Start: 2024-10-10

## 2024-10-10 RX ADMIN — LIDOCAINE HYDROCHLORIDE 5 ML: 10 INJECTION, SOLUTION EPIDURAL; INFILTRATION; INTRACAUDAL; PERINEURAL at 08:46

## 2024-10-10 RX ADMIN — PROPOFOL 60 MG: 10 INJECTION, EMULSION INTRAVENOUS at 08:50

## 2024-10-10 RX ADMIN — PROPOFOL 100 MCG/KG/MIN: 10 INJECTION, EMULSION INTRAVENOUS at 08:46

## 2024-10-10 RX ADMIN — PROPOFOL 80 MG: 10 INJECTION, EMULSION INTRAVENOUS at 08:46

## 2024-10-10 RX ADMIN — SODIUM CHLORIDE, SODIUM LACTATE, POTASSIUM CHLORIDE, AND CALCIUM CHLORIDE: .6; .31; .03; .02 INJECTION, SOLUTION INTRAVENOUS at 08:34

## 2024-10-10 NOTE — H&P
History and Physical - [facility]  Cookie Newby 69 y.o. female MRN: [id number]                  HPI: Cookie Newby is a 69 y.o. year old female who presents for personal history of colon polyps      REVIEW OF SYSTEMS: Per the HPI, and otherwise unremarkable.    Historical Information   Past Medical History:   Diagnosis Date    Atrial fibrillation (HCC)     Colon polyp     Hypertension     Mitral valve prolapse     Obesity     last assessed 4/27/2017     Past Surgical History:   Procedure Laterality Date    CATARACT EXTRACTION      B/L    COLONOSCOPY      HEMORRHOID SURGERY      TUBAL LIGATION  1994    UPPER GASTROINTESTINAL ENDOSCOPY       Social History   Social History     Substance and Sexual Activity   Alcohol Use Yes    Alcohol/week: 3.0 standard drinks of alcohol    Types: 3 Glasses of wine per week     Social History     Substance and Sexual Activity   Drug Use Never     Social History     Tobacco Use   Smoking Status Former    Current packs/day: 0.50    Average packs/day: 0.5 packs/day for 96.8 years (48.4 ttl pk-yrs)    Types: Cigarettes    Start date: 1/1/1973    Passive exposure: Never   Smokeless Tobacco Never     Family History   Problem Relation Age of Onset    Hyperlipidemia Mother     Hypertension Mother     No Known Problems Father     No Known Problems Sister     No Known Problems Daughter     No Known Problems Maternal Grandmother     No Known Problems Paternal Grandmother     No Known Problems Sister     No Known Problems Daughter     Pancreatic cancer Maternal Aunt 75    No Known Problems Maternal Aunt     No Known Problems Maternal Aunt     No Known Problems Maternal Aunt     No Known Problems Paternal Aunt     No Known Problems Paternal Aunt     No Known Problems Paternal Aunt        Meds/Allergies       Current Outpatient Medications:     amLODIPine (NORVASC) 5 mg tablet    atorvastatin (LIPITOR) 20 mg tablet    brimonidine tartrate 0.2 % ophthalmic solution     bromfenac sodium (Prolensa) 0.07 % SOLN    flecainide (TAMBOCOR) 100 mg tablet    ketorolac (ACULAR) 0.5 % ophthalmic solution    latanoprost (XALATAN) 0.005 % ophthalmic solution    metoprolol tartrate (LOPRESSOR) 25 mg tablet    prednisoLONE acetate (PRED FORTE) 1 % ophthalmic suspension    sodium picosulfate, magnesium oxide, citric acid (Clenpiq) oral solution    Eliquis 5 MG    No Known Allergies    Objective     /80   Pulse 67   Temp 98.1 °F (36.7 °C) (Temporal)   Resp 18   SpO2 98%       PHYSICAL EXAM    Gen: NAD  Head: NCAT  CV: RRR  CHEST: Clear  ABD: soft, NT/ND  EXT: no edema      ASSESSMENT/PLAN:  This is a 69 y.o. year old female here for colonoscopy, and she is stable and optimized for her procedure.

## 2024-10-10 NOTE — ANESTHESIA PREPROCEDURE EVALUATION
Procedure:  COLONOSCOPY    Relevant Problems   ANESTHESIA (within normal limits)      CARDIO   (+) Benign essential hypertension   (+) Hyperlipidemia LDL goal <130   (+) Other hyperlipidemia   (+) PAF (paroxysmal atrial fibrillation) (HCC)      ENDO (within normal limits)      PULMONARY (within normal limits)        Physical Exam    Airway    Mallampati score: II  TM Distance: >3 FB  Neck ROM: full     Dental       Cardiovascular  Rhythm: irregular, Rate: normal    Pulmonary   Breath sounds clear to auscultation    Other Findings  post-pubertal.      Anesthesia Plan  ASA Score- 3     Anesthesia Type- IV sedation with anesthesia with ASA Monitors.         Additional Monitors:     Airway Plan:            Plan Factors-Exercise tolerance (METS): >4 METS.    Chart reviewed.   Existing labs reviewed. Patient summary reviewed.    Patient is not a current smoker.              Induction- intravenous.    Postoperative Plan-         Informed Consent- Anesthetic plan and risks discussed with patient.  I personally reviewed this patient with the CRNA. Discussed and agreed on the Anesthesia Plan with the CRNA..

## 2024-10-10 NOTE — ANESTHESIA POSTPROCEDURE EVALUATION
Post-Op Assessment Note    CV Status:  Stable         Mental Status:  Alert   PONV Controlled:  Controlled   Airway Patency:  Patent     Post Op Vitals Reviewed: Yes    No anethesia notable event occurred.    Staff: CRNA           Last Filed PACU Vitals:  Vitals Value Taken Time   Temp     Pulse 53    /60    Resp 20    SpO2 100        Modified Yesy:  No data recorded

## 2024-10-10 NOTE — ANESTHESIA POSTPROCEDURE EVALUATION
Post-Op Assessment Note    CV Status:  Stable  Pain Score: 1    Pain management: adequate       Mental Status:  Alert and awake   Hydration Status:  Euvolemic and stable   PONV Controlled:  Controlled   Airway Patency:  Patent     Post Op Vitals Reviewed: Yes    No anethesia notable event occurred.    Staff: Anesthesiologist           Last Filed PACU Vitals:  Vitals Value Taken Time   Temp     Pulse 57 10/10/24 0928   /67 10/10/24 0928   Resp 20 10/10/24 0928   SpO2 96 % 10/10/24 0928       Modified Yesy:  Activity: 2 (10/10/2024  9:28 AM)  Respiration: 2 (10/10/2024  9:28 AM)  Circulation: 2 (10/10/2024  9:28 AM)  Consciousness: 2 (10/10/2024  9:28 AM)  Oxygen Saturation: 2 (10/10/2024  9:28 AM)  Modified Yesy Score: 10 (10/10/2024  9:28 AM)

## 2024-10-17 PROCEDURE — 88305 TISSUE EXAM BY PATHOLOGIST: CPT | Performed by: STUDENT IN AN ORGANIZED HEALTH CARE EDUCATION/TRAINING PROGRAM

## 2024-10-20 DIAGNOSIS — I48.0 PAROXYSMAL ATRIAL FIBRILLATION (HCC): ICD-10-CM

## 2024-10-21 RX ORDER — METOPROLOL TARTRATE 25 MG/1
12.5 TABLET, FILM COATED ORAL EVERY 12 HOURS
Qty: 90 TABLET | Refills: 1 | Status: SHIPPED | OUTPATIENT
Start: 2024-10-21

## 2024-10-21 NOTE — TELEPHONE ENCOUNTER
Patient called to request a refill for their Metoprolol advised a refill was requested on 10/20 from pharmacy and is pending approval. Patient verbalized understanding and is in agreement.     This is her 2nd day without Rx. Will send HP message.

## 2025-01-20 DIAGNOSIS — I48.0 PAROXYSMAL ATRIAL FIBRILLATION (HCC): ICD-10-CM

## 2025-01-21 RX ORDER — METOPROLOL TARTRATE 25 MG/1
12.5 TABLET, FILM COATED ORAL EVERY 12 HOURS
Qty: 90 TABLET | Refills: 1 | OUTPATIENT
Start: 2025-01-21

## 2025-01-30 ENCOUNTER — RESULTS FOLLOW-UP (OUTPATIENT)
Dept: FAMILY MEDICINE CLINIC | Facility: CLINIC | Age: 70
End: 2025-01-30

## 2025-01-30 LAB
ALBUMIN SERPL-MCNC: 4.7 G/DL (ref 3.9–4.9)
ALP SERPL-CCNC: 114 IU/L (ref 44–121)
ALT SERPL-CCNC: 25 IU/L (ref 0–32)
AST SERPL-CCNC: 22 IU/L (ref 0–40)
BILIRUB SERPL-MCNC: 0.5 MG/DL (ref 0–1.2)
BUN SERPL-MCNC: 13 MG/DL (ref 8–27)
BUN/CREAT SERPL: 13 (ref 12–28)
CALCIUM SERPL-MCNC: 9.4 MG/DL (ref 8.7–10.3)
CHLORIDE SERPL-SCNC: 102 MMOL/L (ref 96–106)
CO2 SERPL-SCNC: 26 MMOL/L (ref 20–29)
CREAT SERPL-MCNC: 1 MG/DL (ref 0.57–1)
EGFR: 61 ML/MIN/1.73
EST. AVERAGE GLUCOSE BLD GHB EST-MCNC: 114 MG/DL
GLOBULIN SER-MCNC: 2.3 G/DL (ref 1.5–4.5)
GLUCOSE SERPL-MCNC: 122 MG/DL (ref 70–99)
HBA1C MFR BLD: 5.6 % (ref 4.8–5.6)
POTASSIUM SERPL-SCNC: 3.8 MMOL/L (ref 3.5–5.2)
PROT SERPL-MCNC: 7 G/DL (ref 6–8.5)
SODIUM SERPL-SCNC: 143 MMOL/L (ref 134–144)

## 2025-02-05 ENCOUNTER — OFFICE VISIT (OUTPATIENT)
Dept: FAMILY MEDICINE CLINIC | Facility: CLINIC | Age: 70
End: 2025-02-05
Payer: MEDICARE

## 2025-02-05 VITALS
HEIGHT: 66 IN | DIASTOLIC BLOOD PRESSURE: 70 MMHG | BODY MASS INDEX: 30.7 KG/M2 | WEIGHT: 191 LBS | SYSTOLIC BLOOD PRESSURE: 128 MMHG | TEMPERATURE: 96.2 F | HEART RATE: 80 BPM | RESPIRATION RATE: 20 BRPM

## 2025-02-05 DIAGNOSIS — I10 BENIGN ESSENTIAL HYPERTENSION: ICD-10-CM

## 2025-02-05 DIAGNOSIS — I48.0 PAROXYSMAL ATRIAL FIBRILLATION (HCC): ICD-10-CM

## 2025-02-05 DIAGNOSIS — E78.49 OTHER HYPERLIPIDEMIA: Primary | ICD-10-CM

## 2025-02-05 PROCEDURE — 99214 OFFICE O/P EST MOD 30 MIN: CPT | Performed by: FAMILY MEDICINE

## 2025-02-05 PROCEDURE — G2211 COMPLEX E/M VISIT ADD ON: HCPCS | Performed by: FAMILY MEDICINE

## 2025-02-05 RX ORDER — ATORVASTATIN CALCIUM 20 MG/1
20 TABLET, FILM COATED ORAL DAILY
Qty: 100 TABLET | Refills: 1 | Status: SHIPPED | OUTPATIENT
Start: 2025-02-05

## 2025-02-05 RX ORDER — AMLODIPINE BESYLATE 5 MG/1
5 TABLET ORAL DAILY
Qty: 100 TABLET | Refills: 1 | Status: SHIPPED | OUTPATIENT
Start: 2025-02-05

## 2025-02-05 NOTE — PROGRESS NOTES
Name: Cookie Newby      : 1955      MRN: [id number]  Encounter Provider: Ankit Garcia DO  Encounter Date: 2025   Encounter department: [facility]  :  Assessment & Plan  Benign essential hypertension  Stable on meds  Continue diet/exercise  Orders:    amLODIPine (NORVASC) 5 mg tablet; Take 1 tablet (5 mg total) by mouth daily    Paroxysmal atrial fibrillation (HCC)  On noac  F/u cardiology       Other hyperlipidemia  Stable on statin  Continue same for risk reduction  Orders:    atorvastatin (LIPITOR) 20 mg tablet; Take 1 tablet (20 mg total) by mouth daily       History of Present Illness   HPI  Occas palpitations  On NOAC  No bleeding noted  Exercises daily  Drinks water    Review of Systems   Gastrointestinal:  Negative for blood in stool.   Genitourinary:  Negative for hematuria.     Family History   Problem Relation Age of Onset    Hyperlipidemia Mother     Hypertension Mother     No Known Problems Father     No Known Problems Sister     No Known Problems Daughter     No Known Problems Maternal Grandmother     No Known Problems Paternal Grandmother     No Known Problems Sister     No Known Problems Daughter     Pancreatic cancer Maternal Aunt 75    No Known Problems Maternal Aunt     No Known Problems Maternal Aunt     No Known Problems Maternal Aunt     No Known Problems Paternal Aunt     No Known Problems Paternal Aunt     No Known Problems Paternal Aunt       Social History     Tobacco Use    Smoking status: Former     Current packs/day: 0.50     Average packs/day: 0.5 packs/day for 97.1 years (48.5 ttl pk-yrs)     Types: Cigarettes     Start date: 1973     Passive exposure: Never    Smokeless tobacco: Never   Vaping Use    Vaping status: Never Used   Substance Use Topics    Alcohol use: Yes     Alcohol/week: 3.0 standard drinks of alcohol     Types: 3 Glasses of wine per week    Drug use: Never      Current Outpatient Medications   Medication Instructions    amLODIPine  "(NORVASC) 5 mg, Oral, Daily    atorvastatin (LIPITOR) 20 mg, Oral, Daily    brimonidine tartrate 0.2 % ophthalmic solution No dose, route, or frequency recorded.    Eliquis 5 mg, Oral, 2 times daily    flecainide (TAMBOCOR) 100 mg, Oral, 2 times daily    latanoprost (XALATAN) 0.005 % ophthalmic solution 1 drop, Daily at bedtime    metoprolol tartrate (LOPRESSOR) 12.5 mg, Oral, Every 12 hours    prednisoLONE acetate (PRED FORTE) 1 % ophthalmic suspension 1 drop, 4 times daily       >>>>>>>>  Return in about 6 months (around 8/5/2025).  >>>>>>>>    Visit Vitals  /70   Pulse 80 Comment: irregular   Temp (!) 96.2 °F (35.7 °C)   Resp 20   Ht 5' 6\" (1.676 m)   Wt 86.6 kg (191 lb)   BMI 30.83 kg/m²   OB Status Postmenopausal   Smoking Status Former   BSA 1.96 m²          Objective   /70   Pulse 80 Comment: irregular  Temp (!) 96.2 °F (35.7 °C)   Resp 20   Ht 5' 6\" (1.676 m)   Wt 86.6 kg (191 lb)   BMI 30.83 kg/m²      Physical Exam  Vitals and nursing note reviewed.   Constitutional:       General: She is not in acute distress.     Appearance: She is well-developed. She is obese. She is not ill-appearing.   HENT:      Head: Normocephalic.      Right Ear: Tympanic membrane normal.      Left Ear: Tympanic membrane normal.   Eyes:      General: No scleral icterus.     Conjunctiva/sclera: Conjunctivae normal.   Neck:      Vascular: No carotid bruit.   Cardiovascular:      Rate and Rhythm: Normal rate and regular rhythm.      Heart sounds: No murmur heard.  Pulmonary:      Effort: Pulmonary effort is normal. No respiratory distress.      Breath sounds: No wheezing.   Abdominal:      General: There is no distension.      Palpations: Abdomen is soft.      Tenderness: There is no abdominal tenderness.   Musculoskeletal:         General: No deformity.      Cervical back: Neck supple.      Right lower leg: No edema.      Left lower leg: No edema.   Skin:     General: Skin is warm and dry.      Coloration: Skin is " not pale.   Neurological:      Mental Status: She is alert.      Motor: No weakness.      Gait: Gait normal.   Psychiatric:         Mood and Affect: Mood normal.         Behavior: Behavior normal.         Thought Content: Thought content normal.

## 2025-02-05 NOTE — ASSESSMENT & PLAN NOTE
Stable on meds  Continue diet/exercise  Orders:    amLODIPine (NORVASC) 5 mg tablet; Take 1 tablet (5 mg total) by mouth daily

## 2025-02-06 NOTE — ASSESSMENT & PLAN NOTE
Stable on statin  Continue same for risk reduction  Orders:    atorvastatin (LIPITOR) 20 mg tablet; Take 1 tablet (20 mg total) by mouth daily

## 2025-04-03 ENCOUNTER — OFFICE VISIT (OUTPATIENT)
Dept: CARDIOLOGY CLINIC | Facility: CLINIC | Age: 70
End: 2025-04-03
Payer: MEDICARE

## 2025-04-03 VITALS
HEART RATE: 57 BPM | WEIGHT: 189 LBS | DIASTOLIC BLOOD PRESSURE: 70 MMHG | HEIGHT: 66 IN | SYSTOLIC BLOOD PRESSURE: 120 MMHG | BODY MASS INDEX: 30.37 KG/M2 | OXYGEN SATURATION: 97 %

## 2025-04-03 DIAGNOSIS — I48.0 PAROXYSMAL ATRIAL FIBRILLATION (HCC): ICD-10-CM

## 2025-04-03 DIAGNOSIS — E78.5 DYSLIPIDEMIA: ICD-10-CM

## 2025-04-03 DIAGNOSIS — E66.9 OBESITY (BMI 30-39.9): ICD-10-CM

## 2025-04-03 DIAGNOSIS — R06.02 EXERTIONAL SHORTNESS OF BREATH: ICD-10-CM

## 2025-04-03 DIAGNOSIS — I11.9 HYPERTENSIVE HEART DISEASE WITHOUT HEART FAILURE: ICD-10-CM

## 2025-04-03 PROCEDURE — 93000 ELECTROCARDIOGRAM COMPLETE: CPT | Performed by: INTERNAL MEDICINE

## 2025-04-03 PROCEDURE — 99214 OFFICE O/P EST MOD 30 MIN: CPT | Performed by: INTERNAL MEDICINE

## 2025-04-03 NOTE — PROGRESS NOTES
Consultation - Cardiology Office  [facility].    Cookie Newby 69 y.o. female MRN: [id number]  : 1955  Unit/Bed#:  Encounter: [id number]      Assessment:     1. Exertional shortness of breath    2. Paroxysmal atrial fibrillation (HCC)    3. Hypertensive heart disease without heart failure    4. Dyslipidemia    5. Obesity (BMI 30-39.9)        Discussion summary and Plan:      1. Paroxysmal atrial fibrillation.  She is now maintaining sinus rhythm she is on flecainide 100 mg twice a day along with metoprolol 12.5 twice a day.  Heart rate 57 bpm.  Flecainide level is acceptable she is on Eliquis for antithrombotic therapy.  Continue same medication repeat flecainide level ordered.      2. Exertion shortness of breath.  Stable not changed.  She is feeling much better since cardioversion.    3. Benign essential hypertension.  Blood pressure acceptable with current therapy.  She is on metoprolol and amlodipine.  Pressure has been acceptable.  Electrolyte test done 2024 acceptable.  She is scheduled for her blood test with Dr. Lake.        4. History of mitral valve disorder.  No evidence of any significant mitral valve disease.  Echo reviewed other issues.      5. History of dyslipidemia.  Cholesterol is not at goal LFTs are acceptable she is on Lipitor 20 mg.  She will monitor her diet if not we may need to increase the dose to keep LDL around 100.  Continue statins.     6. History of impaired glucose metabolism his blood glucose 102.  Lytes recent labs from  reviewed.    7. Class 1 obesity with BMI around 30 as she has lost some weight.      Continue current Rx.  Will wait for the next blood test if LDL still more than 100 willing to consider increasing Lipitor or adding Zetia 10 mg.    Follow-up 6 months.  Thank you for your consultation.  If he have any question please call me at [phone]    Counseling :  A description of the counseling.  Goals and Barriers.  Patient's  ability to self care: Yes  Medication side effect reviewed with patient in detail and all their questions answered to their satisfaction.      Primary Care Physician: Ankit Garcia DO      HPI :     Cookie Newby is a 69 y.o. year old female who   Self-referred as she was in [place] in the hospital with atrial fibrillation with rapid ventricular rate.  Patient was at a function in [place] when next the morning she found that she could not breathe she was taken to local hospital where she was found to be in AFib with rapid ventricular rate.  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  As per patient she has a KATT guided cardioversion and she was given IV diuretics as well as Cardizem and started on Eliquis.  She flew back Sunday and she has been doing well since yesterday she started feeling little lightheaded and fatigue and tired and today she came to see us in she is found to be in AFib with heart rate around 124 beats per minute.  Unfortunately she has to fly tomorrow afternoon for her daughter's wedding to [place] but she is in atrial fibrillation. She is little upset with whole situation.  No nausea no vomiting no PND no orthopnea.  She has echo and KATT done but no stress test was done.  We do not have those records available.  She did bring some records from the hospital which includes labs her TSH was acceptable.  Her liver enzymes were elevated.  And she had probably pleural effusion as mentioned in the discharge recommendation to her.  She is not taking any diuretic at this time.      08/05/2022.      Above reviewed.  Patient came for follow-up she is doing well.  She has no new symptoms but few weeks ago she may have brief episode of atrial fibrillation.  She is very anxious and busy.  But today she has sinus rhythm heart rate 63 beats per minute.  She has medical history significant for paroxysmal atrial fibrillation currently suppressed with  flecainide, dyslipidemia on statin, possible sick sinus syndrome.  No chest pain she does get exertional shortness of breath which is not changed.  He is also compliant with her statins.  Her flecainide level in July 21 was 0.49.      2/8/2023.    Above reviewed.  Patient came for follow-up she is doing well.  She is doing now much better as she has retired.  She had a history of paroxysmal atrial fibrillation currently in sinus suppressed with flecainide, dyslipidemia on statin with recently increased dose, sick sinus syndrome who came for follow-up.  She denies any chest pain any shortness of breath.  She has no other cardiovascular issues blood test done October 2022 shows LDL was 140 other electrolytes were stable.  EKG shows no change from previous EKG.  Heart rate is 62 bpm.  Her flecainide level was checked on 10/31/2022 it was 0.49.    7/25/2023.    Above reviewed.  Patient called today as she was not feeling well she was feeling fatigue and tired and shortness of breath.  She noted that her pulse rate was irregular she came for EKG event found to be in atrial fibrillation with controlled ventricular rate.  She feels more fatigue and tired.  She is going on vacation.  She does have history of sick sinus syndrome, atrial fibrillation s/p cardioversion.  She has blood test done 3/7/2023 which shows her cholesterol is elevated with elevated LDL.  Other labs were acceptable.  Recently cholesterol medication was also adjusted.  Previously flecainide level was acceptable.  No nausea no vomiting no leg swelling no other issues.    9/22/2023.    Above reviewed.  Patient came for follow-up.  She is feeling better no more episodes of irregular heartbeat noted.  She has her daughter's wedding done without any issues.  Today she is sinus with heart rate 52 bpm.  She had history of atrial fibrillation she has been on flecainide and metoprolol.  She has not done her flecainide and has a blood test which will be ordered no  other cardiovascular issues.    7/23/2024.    Above reviewed.  Patient came for follow-up she is doing better from cardiac point of view.  Today EKG shows sinus some heart rate 64 bpm.  She is having issues with her I may need injections but she is following with ophthalmologist.  No cardiovascular issues at this time blood test done in February shows flecainide level 0.61 other labs were acceptable.  Cholesterol LDL still around 120 she is on Lipitor 20 mg.  She has been on vacation she will work on her diet.  No new issues.  Some exertional shortness of breath which is due to hemodynamically better which is not changed also.    4/3/2025.    Above reviewed.  Patient came for follow-up.  She was having some issues with her eyes she had injections she has retinal edema.  She has medical history significant for PAF, dyslipidemia, some exertional shortness of breath and vision problems.  Her blood test from January 2025 reviewed.  Her cholesterol from August 2024 shows LDL is 117.  She says she has been compliant with her medications.  She feels well.  No cardiovascular issues she has some retinal problems.  EK today shows sinus rhythm heart rate 57 bpm leftward axis.  Other than some eye issues no problems from cardiac point of view.          Review of Systems   Constitutional:  Negative for activity change, chills, diaphoresis, fever and unexpected weight change.   HENT:  Negative for congestion.    Eyes:  Negative for discharge and redness.   Respiratory:  Negative for cough, chest tightness, shortness of breath and wheezing.    Cardiovascular: Negative.  Negative for chest pain, palpitations and leg swelling.   Gastrointestinal:  Negative for abdominal pain, diarrhea and nausea.   Endocrine: Negative.    Genitourinary:  Negative for decreased urine volume and urgency.   Musculoskeletal: Negative.  Negative for arthralgias, back pain and gait problem.   Skin:  Negative for rash and wound.   Allergic/Immunologic:  Negative.    Neurological:  Negative for dizziness, seizures, syncope, weakness, light-headedness and headaches.   Hematological: Negative.    Psychiatric/Behavioral:  Negative for agitation and confusion. The patient is not nervous/anxious.        Historical Information   Past Medical History:   Diagnosis Date   • Atrial fibrillation (HCC) April 2019   • Colon polyp    • Hypertension    • Mitral valve prolapse 1960   • Obesity     last assessed 4/27/2017     Past Surgical History:   Procedure Laterality Date   • CATARACT EXTRACTION      B/L   • COLONOSCOPY     • EYE SURGERY  2002   • HEMORRHOID SURGERY     • TUBAL LIGATION  1994   • UPPER GASTROINTESTINAL ENDOSCOPY       Social History     Substance and Sexual Activity   Alcohol Use Yes   • Alcohol/week: 3.0 standard drinks of alcohol   • Types: 3 Glasses of wine per week     Social History     Substance and Sexual Activity   Drug Use Never     Social History     Tobacco Use   Smoking Status Former   • Current packs/day: 0.50   • Average packs/day: 0.5 packs/day for 97.3 years (48.6 ttl pk-yrs)   • Types: Cigarettes   • Start date: 1/1/1973   • Passive exposure: Never   Smokeless Tobacco Never     Family History:   Family History   Problem Relation Age of Onset   • Hyperlipidemia Mother    • Hypertension Mother    • No Known Problems Father    • No Known Problems Sister    • No Known Problems Daughter    • No Known Problems Maternal Grandmother    • No Known Problems Paternal Grandmother    • No Known Problems Sister    • No Known Problems Daughter    • Pancreatic cancer Maternal Aunt 75   • No Known Problems Maternal Aunt    • No Known Problems Maternal Aunt    • No Known Problems Maternal Aunt    • No Known Problems Paternal Aunt    • No Known Problems Paternal Aunt    • No Known Problems Paternal Aunt        Meds/Allergies     No Known Allergies    Current Outpatient Medications:   •  amLODIPine (NORVASC) 5 mg tablet, Take 1 tablet (5 mg total) by mouth daily,  "Disp: 100 tablet, Rfl: 1  •  atorvastatin (LIPITOR) 20 mg tablet, Take 1 tablet (20 mg total) by mouth daily, Disp: 100 tablet, Rfl: 1  •  brimonidine tartrate 0.2 % ophthalmic solution, , Disp: , Rfl:   •  Eliquis 5 MG, TAKE 1 TABLET BY MOUTH TWICE  DAILY, Disp: 180 tablet, Rfl: 3  •  flecainide (TAMBOCOR) 100 mg tablet, TAKE 1 TABLET BY MOUTH TWICE  DAILY, Disp: 180 tablet, Rfl: 3  •  latanoprost (XALATAN) 0.005 % ophthalmic solution, Administer 1 drop to both eyes daily at bedtime, Disp: , Rfl:   •  metoprolol tartrate (LOPRESSOR) 25 mg tablet, TAKE 1/2 TABLET EVERY 12 HOURS, Disp: 90 tablet, Rfl: 1  •  prednisoLONE acetate (PRED FORTE) 1 % ophthalmic suspension, 1 drop 4 (four) times a day, Disp: , Rfl:     Vitals: Blood pressure 120/70, pulse 57, height 5' 6\" (1.676 m), weight 85.7 kg (189 lb), SpO2 97%.    Body mass index is 30.51 kg/m².  Vitals:    04/03/25 0952   Weight: 85.7 kg (189 lb)     BP Readings from Last 3 Encounters:   04/03/25 120/70   02/05/25 128/70   10/10/24 128/67         Physical Exam  Constitutional:       General: She is not in acute distress.     Appearance: She is well-developed. She is not diaphoretic.   Neck:      Thyroid: No thyromegaly.      Vascular: No JVD.      Trachea: No tracheal deviation.   Cardiovascular:      Rate and Rhythm: Normal rate and regular rhythm.      Heart sounds: S1 normal and S2 normal. Heart sounds not distant. Murmur heard.      Systolic (ejection) murmur is present with a grade of 2/6.      No friction rub. No gallop. No S3 or S4 sounds.   Pulmonary:      Effort: Pulmonary effort is normal. No respiratory distress.      Breath sounds: Normal breath sounds. No wheezing or rales.   Chest:      Chest wall: No tenderness.   Abdominal:      General: Bowel sounds are normal. There is no distension.      Palpations: Abdomen is soft.      Tenderness: There is no abdominal tenderness.   Musculoskeletal:         General: No deformity.      Cervical back: Neck supple. "   Skin:     General: Skin is warm and dry.      Coloration: Skin is not pale.      Findings: No rash.   Neurological:      Mental Status: She is alert and oriented to person, place, and time.   Psychiatric:         Behavior: Behavior normal.         Judgment: Judgment normal.           Diagnostic Studies Review Cardio:  Echo stress test reviewed    EKG:      Twelve lead EKG done in our office on 04/23/2019 shows atrial fibrillation with rapid ventricular rate heart rate 124 beats per minute.    Twelve lead EKG done in our office 05/17/2019 shows normal sinus rhythm heart rate 58 beats per minute.  Left atrial enlargement.  Incomplete RBBB.  As compared to old EKG patient is now in sinus rhythm.    Twelve lead EKG done 09/24/2019 shows normal sinus rhythm RSR pattern heart rate 78 beats per minute no significant change from old EKG patient is staying in sinus rhythm.    Twelve lead EKG shows atrial fibrillation with heart rate 139 beats per minute.  As compared to previous EKG she is now in atrial fibrillation.     Twelve lead EKG 03/22/2021 shows sinus rhythm heart rate 59 beats per minute incomplete RBBB.  No change from previous EKG QS in V1 to V3 most likely due to lead location.     12 lead EKG done 10/01/2021 shows normal sinus rhythm heart rate 54 beats per minute.  Incomplete RBBB.  no change from previous EKG.      Twelve lead EKG 08/05/2022 shows sinus rhythm  milliseconds.  Left axis deviation.  Incomplete RBBB note significant change from previous EKG.  Heart rate is 63 beats per minute.    Twelve-lead EKG done 2/8/2023 shows sinus rhythm left is deviation incomplete RBBB.  No other significant normality heart rate 62 bpm.  QTc 470 ms not changed from previous EKG    Twelve-lead EKG done 4/25/2023 shows atrial fibrillation heart rate 81 bpm.  Compared to previous EKG and February 2023 patient is now in atrial fibrillation     Twelve-lead EKG done on 9/22/2023 shows sinus bradycardia heart rate 52  "bpm no other significant abnormality.    Twelve-lead EKG done on 7/23/2024 sinus rhythm left axis deviation heart rate 64 bpm.    Twelve-lead EKG done on 4/3/2025 shows normal sinus rhythm with a heart rate 57 bpm left axis deviation no change from previous EKG.      Lab Review     BMP:  Lab Results   Component Value Date    SODIUM 143 01/30/2025    K 3.8 01/30/2025     01/30/2025    CO2 26 01/30/2025    BUN 13 01/30/2025    CREATININE 1.00 01/30/2025    GLUC 122 (H) 01/30/2025    CALCIUM 9.0 01/20/2020    EGFR 61 01/30/2025     LFT:  Lab Results   Component Value Date    AST 22 01/30/2025    ALT 25 01/30/2025    ALKPHOS 90 01/20/2020    TP 7.0 01/30/2025    ALB 4.7 01/30/2025      No results found for: \"[id number]\"  Lab Results   Component Value Date    HGBA1C 5.6 01/30/2025     Lipid Profile:   Lab Results   Component Value Date    CHOLESTEROL 197 08/28/2024    HDL 55 08/28/2024    LDLCALC 117 (H) 08/28/2024    TRIG 140 08/28/2024     Lab Results   Component Value Date    CHOLESTEROL 197 08/28/2024    CHOLESTEROL 211 (H) 02/07/2024       Flecainide level in July 2021 0.49    The 10-year ASCVD risk score (Dorothy DK, et al., 2019) is: 11.1%    Values used to calculate the score:      Age: 69 years      Sex: Female      Is Non- : No      Diabetic: No      Tobacco smoker: No      Systolic Blood Pressure: 120 mmHg      Is BP treated: Yes      HDL Cholesterol: 55 mg/dL      Total Cholesterol: 197 mg/dL     Dr. Helder Damon MD [facility]      \"This note has been constructed using a voice recognition system.Therefore there may be syntax, spelling, and/or grammatical errors. Please call if you have any questions. \"  "

## 2025-04-21 DIAGNOSIS — I48.0 PAROXYSMAL ATRIAL FIBRILLATION (HCC): ICD-10-CM

## 2025-04-21 NOTE — TELEPHONE ENCOUNTER
Reason for call:   [x] Refill   [] Prior Auth  [] Other:     Office:   [] PCP/Provider -   [x] Specialty/Provider - Cardio/Damon    Medication: Metoprolol tart 25mg    Dose/Frequency: 1/2 tab q12h     Quantity:90    Pharmacy: [facility]   Does the patient have enough for 3 days?   [x] Yes   [] No - Send as HP to POD

## 2025-04-22 RX ORDER — METOPROLOL TARTRATE 25 MG/1
12.5 TABLET, FILM COATED ORAL EVERY 12 HOURS
Qty: 90 TABLET | Refills: 1 | Status: SHIPPED | OUTPATIENT
Start: 2025-04-22

## 2025-06-07 DIAGNOSIS — I48.0 PAROXYSMAL ATRIAL FIBRILLATION (HCC): ICD-10-CM

## 2025-06-09 RX ORDER — FLECAINIDE ACETATE 100 MG/1
100 TABLET ORAL 2 TIMES DAILY
Qty: 180 TABLET | Refills: 3 | Status: SHIPPED | OUTPATIENT
Start: 2025-06-09

## 2025-06-25 DIAGNOSIS — E78.49 OTHER HYPERLIPIDEMIA: ICD-10-CM

## 2025-06-25 DIAGNOSIS — I10 BENIGN ESSENTIAL HYPERTENSION: ICD-10-CM

## 2025-06-25 DIAGNOSIS — I48.0 PAROXYSMAL ATRIAL FIBRILLATION (HCC): ICD-10-CM

## 2025-06-25 RX ORDER — APIXABAN 5 MG/1
5 TABLET, FILM COATED ORAL 2 TIMES DAILY
Qty: 180 TABLET | Refills: 3 | Status: SHIPPED | OUTPATIENT
Start: 2025-06-25

## 2025-06-26 RX ORDER — AMLODIPINE BESYLATE 5 MG/1
5 TABLET ORAL DAILY
Qty: 90 TABLET | Refills: 1 | Status: SHIPPED | OUTPATIENT
Start: 2025-06-26

## 2025-06-27 RX ORDER — ATORVASTATIN CALCIUM 20 MG/1
20 TABLET, FILM COATED ORAL DAILY
Qty: 90 TABLET | Refills: 1 | Status: SHIPPED | OUTPATIENT
Start: 2025-06-27

## 2025-07-10 DIAGNOSIS — I48.0 PAROXYSMAL ATRIAL FIBRILLATION (HCC): ICD-10-CM

## 2025-07-11 RX ORDER — METOPROLOL TARTRATE 25 MG/1
12.5 TABLET, FILM COATED ORAL EVERY 12 HOURS
Qty: 90 TABLET | Refills: 1 | Status: SHIPPED | OUTPATIENT
Start: 2025-07-11